# Patient Record
Sex: FEMALE | Race: WHITE | NOT HISPANIC OR LATINO | Employment: OTHER | URBAN - METROPOLITAN AREA
[De-identification: names, ages, dates, MRNs, and addresses within clinical notes are randomized per-mention and may not be internally consistent; named-entity substitution may affect disease eponyms.]

---

## 2021-01-13 DIAGNOSIS — Z23 NEED FOR VACCINATION: ICD-10-CM

## 2021-02-10 ENCOUNTER — OFFICE VISIT (OUTPATIENT)
Dept: SLEEP MEDICINE | Facility: MEDICAL CENTER | Age: 86
End: 2021-02-10
Payer: MEDICARE

## 2021-02-10 VITALS
WEIGHT: 179.5 LBS | HEART RATE: 73 BPM | DIASTOLIC BLOOD PRESSURE: 60 MMHG | RESPIRATION RATE: 16 BRPM | BODY MASS INDEX: 31.8 KG/M2 | OXYGEN SATURATION: 92 % | HEIGHT: 63 IN | SYSTOLIC BLOOD PRESSURE: 132 MMHG

## 2021-02-10 DIAGNOSIS — R06.02 SOB (SHORTNESS OF BREATH): ICD-10-CM

## 2021-02-10 DIAGNOSIS — J45.20 MILD INTERMITTENT ASTHMA WITHOUT COMPLICATION: ICD-10-CM

## 2021-02-10 PROCEDURE — 99204 OFFICE O/P NEW MOD 45 MIN: CPT | Performed by: INTERNAL MEDICINE

## 2021-02-10 RX ORDER — FEXOFENADINE HCL 180 MG/1
180 TABLET ORAL DAILY
COMMUNITY

## 2021-02-10 RX ORDER — FLUTICASONE PROPIONATE 50 MCG
1 SPRAY, SUSPENSION (ML) NASAL DAILY
COMMUNITY
End: 2022-06-30

## 2021-02-10 RX ORDER — METHYLPREDNISOLONE 4 MG/1
TABLET ORAL
Qty: 21 TABLET | Refills: 0 | Status: SHIPPED | OUTPATIENT
Start: 2021-02-10 | End: 2021-04-28

## 2021-02-10 RX ORDER — ALUMINUM ZIRCONIUM OCTACHLOROHYDREX GLY 16 G/100G
1 GEL TOPICAL DAILY
COMMUNITY
End: 2022-06-30

## 2021-02-10 RX ORDER — CHOLECALCIFEROL (VITAMIN D3) 125 MCG
2000 CAPSULE ORAL DAILY
COMMUNITY
End: 2022-08-25

## 2021-02-10 RX ORDER — CLOBETASOL PROPIONATE 0.05% / NIACINAMIDE 4% 4; .05 G/100G; G/100G
OINTMENT TOPICAL
COMMUNITY

## 2021-02-10 RX ORDER — BUDESONIDE AND FORMOTEROL FUMARATE DIHYDRATE 80; 4.5 UG/1; UG/1
2 AEROSOL RESPIRATORY (INHALATION) 2 TIMES DAILY
COMMUNITY
End: 2021-02-10

## 2021-02-10 RX ORDER — ASPIRIN 81 MG/1
TABLET ORAL
COMMUNITY
End: 2022-06-03

## 2021-02-10 RX ORDER — DOXYCYCLINE HYCLATE 100 MG/1
100 CAPSULE ORAL 2 TIMES DAILY
Qty: 10 CAPSULE | Refills: 0 | Status: SHIPPED | OUTPATIENT
Start: 2021-02-10 | End: 2021-04-28

## 2021-02-10 RX ORDER — FENOFIBRATE 160 MG/1
160 TABLET ORAL DAILY
COMMUNITY
End: 2023-01-10

## 2021-02-10 RX ORDER — AZELASTINE 1 MG/ML
1 SPRAY, METERED NASAL 2 TIMES DAILY
COMMUNITY
End: 2022-06-03

## 2021-02-10 ASSESSMENT — ENCOUNTER SYMPTOMS
CLAUDICATION: 0
WEAKNESS: 0
WEIGHT LOSS: 0
FALLS: 0
BLURRED VISION: 0
VOMITING: 0
NAUSEA: 0
DIARRHEA: 0
COUGH: 0
PND: 0
CHILLS: 0
EYE DISCHARGE: 0
HEADACHES: 0
SPEECH CHANGE: 0
HEARTBURN: 0
SPUTUM PRODUCTION: 0
WHEEZING: 0
DIZZINESS: 0
PALPITATIONS: 0
CONSTIPATION: 0
ABDOMINAL PAIN: 0
DOUBLE VISION: 0
MYALGIAS: 0
DIAPHORESIS: 0
SINUS PAIN: 0
PHOTOPHOBIA: 0
EYE REDNESS: 0
NECK PAIN: 0
HEMOPTYSIS: 0
FOCAL WEAKNESS: 0
ORTHOPNEA: 0
SHORTNESS OF BREATH: 1
FEVER: 0
SORE THROAT: 0
DEPRESSION: 0
BACK PAIN: 0
EYE PAIN: 0
STRIDOR: 0
TREMORS: 0

## 2021-02-10 NOTE — PROGRESS NOTES
Chief Complaint   Patient presents with   • Establish Care     Referred by Franklin Hidalgo for asthma       HPI: This patient is a 89 y.o. female presenting for evaluation of asthma and shortness of breath.  The patient's past medical history is significant for breast cancer status post left mastectomy in 1985, dyslipidemia, hypertension, diabetes, hypothyroid and asthma diagnosed she believes around 1990.  This is thought to be in part allergen induced but was particularly troublesome when she lived in [place] where environmental allergies played a big role.  She is a lifelong non-smoker.  She is retired .  No family history of atopic or autoimmune disease.  The patient does have 2 cats at home, no birds.  She is followed by cardiology at [facility] where she was told she has some valvular insufficiency.  Most recent echo that I can review a report from was done at [place] in June 2020.  No valvular abnormalities were noted although the patient did have a murmur on exam today consistent with her stated history.  With regards to her asthma, she reports having an acute attack last 2 to 3 years ago requiring prednisone.  Outside of this she typically suffers acute bronchitis 2-3 times per year where she tells me is not normally treated with prednisone or antibiotics although cough persists for 8 weeks or longer when she does have symptoms.  She has not had any bronchitis yet this year.  Her current regimen includes Symbicort 80 and montelukast with albuterol as needed.  She does not typically need the albuterol when taking her current inhaler and montelukast on regular basis although she does have difficulty remembering a twice daily inhaler at times.  She also reports some dyspnea on exertion that occurs when walking into buildings.  This is slightly different than her asthma.  No associated chest pain.  She has some mild edema which she attributes to calcium channel blocker..    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   • Aortic regurgitation 10/4/2012   • ASTHMA    • Breast cancer ([facility]) 8/8/2012   • Bronchitis    • Cancer ([facility])    • Carotid artery plaque 10/4/2012   • Chickenpox    • Depression 8/8/2012   • Diabetes    • Diabetes mellitus type 2 in nonobese ([facility]) 8/8/2012   • GERD (gastroesophageal reflux disease)    • Cymro measles    • Glaucoma    • History of colonoscopy 8/8/2012   • History of mammogram 8/8/2012   • Hyperlipidemia 8/8/2012   • Hypertension 8/8/2012   • Hypothyroidism 8/8/2012   • Influenza    • Mumps    • Nasal drainage    • Obesity    • Rosacea    • Thyroid disease    • Whooping cough        Social History     Socioeconomic History   • Marital status: Single     Spouse name: Not on file   • Number of children: Not on file   • Years of education: Not on file   • Highest education level: Not on file   Occupational History   • Not on file   Tobacco Use   • Smoking status: Never Smoker   • Smokeless tobacco: Never Used   Substance and Sexual Activity   • Alcohol use: No   • Drug use: No   • Sexual activity: Not Currently   Other Topics Concern   • Not on file   Social History Narrative   • Not on file     Social Determinants of Health     Financial Resource Strain:    • Difficulty of Paying Living Expenses:    Food Insecurity:    • Worried About Running Out of Food in the Last Year:    • Ran Out of Food in the Last Year:    Transportation Needs:    • Lack of Transportation (Medical):    • Lack of Transportation (Non-Medical):    Physical Activity:    • Days of Exercise per Week:    • Minutes of Exercise per Session:    Stress:    • Feeling of Stress :    Social Connections:    • Frequency of Communication with Friends and Family:    • Frequency of Social Gatherings with Friends and Family:    • Attends Gnosticism Services:    • Active Member of Clubs or Organizations:    • Attends Club or  Organization Meetings:    • Marital Status:    Intimate Partner Violence:    • Fear of Current or Ex-Partner:    • Emotionally Abused:    • Physically Abused:    • Sexually Abused:        Family History   Problem Relation Age of Onset   • Heart Disease Mother          age 88, CAD   • GI Disease Father         cirrhosis, CHF   • Heart Disease Father    • Heart Failure Father    • Other Father        Current Outpatient Medications on File Prior to Visit   Medication Sig Dispense Refill   • Probiotic Product (ALIGN) 4 MG Cap Take 1 capsule by mouth every day.     • aspirin 81 MG EC tablet Take  by mouth.     • azelastine (ASTELIN) 137 MCG/SPRAY nasal spray Administer 1 Spray into affected nostril(S) 2 times a day.     • Rosuvastatin Calcium 10 MG CAPSULE SPRINKLE Take  by mouth.     • fenofibrate (TRIGLIDE) 160 MG tablet Take 160 mg by mouth every day.     • Cholecalciferol (D3 HIGH POTENCY) 2000 UNIT Cap Take 2,000 Units by mouth every day.     • Clobetasol Prop-Niacinamide 0.05-4 % Ointment Apply  topically.     • fexofenadine (ALLEGRA ALLERGY) 180 MG tablet Take 180 mg by mouth every day.     • fluticasone (FLONASE) 50 MCG/ACT nasal spray Administer 1 Spray into affected nostril(S) every day.     • Multiple Vitamins-Minerals (PRESERVISION AREDS 2 PO) Take  by mouth.     • Hydrocod Polst-Chlorphen Polst (TUSSIONEX PENNKINETIC ER) 10-8 MG/5ML LQCR Take 5 mL by mouth every 12 hours as needed. 120 mL 0   • metformin (GLUCOPHAGE) 500 MG TABS Take 1 Tab by mouth 2 times a day, with meals. 180 Tab 3   • montelukast (SINGULAIR) 10 MG TABS Take 1 Tab by mouth every day. 90 Tab 3   • albuterol (VENTOLIN OR PROVENTIL) 108 (90 BASE) MCG/ACT AERS Inhale 2 Puffs by mouth every 6 hours as needed. 2 Inhaler 3   • losartan (COZAAR) 50 MG TABS Take 1 Tab by mouth every day. 90 Each 3   • levothyroxine (SYNTHROID) 75 MCG TABS Take 75 mcg by mouth every day.     • diltiazem (CARDIZEM) 120 MG TABS Take 120 mg by mouth every day.    "  • escitalopram (LEXAPRO) 10 MG TABS Take 10 mg by mouth every day.     • ezetimibe (ZETIA) 10 MG TABS Take 1 Tab by mouth every day. [facility] [phone] (Patient not taking: Reported on 2/10/2021) 90 Tab 3   • simvastatin (ZOCOR) 10 MG TABS Take 0.5 Tabs by mouth every evening. 1/2 tablet (Patient not taking: Reported on 2/10/2021) 90 Tab 3   • rabeprazole (ACIPHEX) 20 MG tablet Take 20 mg by mouth every day. Take on empty stomach in AM        No current facility-administered medications on file prior to visit.       Allergies: Aspirin, Bacitracin, Biaxin [clarithromycin], Pcn [penicillins], and Sulfa drugs    ROS:   Review of Systems   Constitutional: Negative for chills, diaphoresis, fever, malaise/fatigue and weight loss.   HENT: Negative for congestion, ear discharge, ear pain, hearing loss, nosebleeds, sinus pain, sore throat and tinnitus.    Eyes: Negative for blurred vision, double vision, photophobia, pain, discharge and redness.   Respiratory: Positive for shortness of breath. Negative for cough, hemoptysis, sputum production, wheezing and stridor.    Cardiovascular: Negative for chest pain, palpitations, orthopnea, claudication, leg swelling and PND.   Gastrointestinal: Negative for abdominal pain, constipation, diarrhea, heartburn, nausea and vomiting.   Genitourinary: Negative for dysuria and urgency.   Musculoskeletal: Negative for back pain, falls, joint pain, myalgias and neck pain.   Skin: Negative for itching and rash.   Neurological: Negative for dizziness, tremors, speech change, focal weakness, weakness and headaches.   Endo/Heme/Allergies: Negative for environmental allergies.   Psychiatric/Behavioral: Negative for depression.       /60 (BP Location: Right arm, Patient Position: Sitting, BP Cuff Size: Adult)   Pulse 73   Resp 16   Ht 1.6 m (5' 3\")   Wt 81.4 kg (179 lb 8 oz)   SpO2 92%     Physical Exam:  Physical Exam  Vitals reviewed.   Constitutional:       General: She " is not in acute distress.     Appearance: Normal appearance. She is well-developed. She is obese.   HENT:      Head: Normocephalic and atraumatic.      Right Ear: External ear normal.      Left Ear: External ear normal.      Nose: Nose normal. No congestion.      Mouth/Throat:      Mouth: Mucous membranes are moist.      Pharynx: Oropharynx is clear. No oropharyngeal exudate.   Eyes:      General: No scleral icterus.     Extraocular Movements: Extraocular movements intact.      Conjunctiva/sclera: Conjunctivae normal.      Pupils: Pupils are equal, round, and reactive to light.   Neck:      Vascular: No JVD.      Trachea: No tracheal deviation.   Cardiovascular:      Rate and Rhythm: Normal rate and regular rhythm.      Heart sounds: Murmur present. No friction rub. No gallop.       Comments: Intermittent 2 out of 6 systolic murmur heard best at the right upper sternal border  Pulmonary:      Effort: No accessory muscle usage or respiratory distress.      Breath sounds: Normal breath sounds. No wheezing or rales.   Abdominal:      General: There is no distension.      Palpations: Abdomen is soft.      Tenderness: There is no abdominal tenderness.   Musculoskeletal:         General: No tenderness or deformity. Normal range of motion.      Cervical back: Normal range of motion and neck supple.      Right lower leg: No edema.      Left lower leg: No edema.   Lymphadenopathy:      Cervical: No cervical adenopathy.   Skin:     General: Skin is warm and dry.      Findings: No rash.      Nails: There is no clubbing.   Neurological:      Mental Status: She is alert and oriented to person, place, and time.      Cranial Nerves: No cranial nerve deficit.      Gait: Gait normal.   Psychiatric:         Mood and Affect: Mood normal.         Behavior: Behavior normal.         PFTs as reviewed by me personally: No recent    Imaging as reviewed by me personally: No recent    Assessment:  1. Mild intermittent asthma without  complication  PULMONARY FUNCTION TESTS -Test requested: Complete Pulmonary Function Test    Fluticasone Furoate-Vilanterol (BREO ELLIPTA) 100-25 MCG/INH AEROSOL POWDER, BREATH ACTIVATED    Fluticasone Furoate-Vilanterol (BREO ELLIPTA) 100-25 MCG/INH AEROSOL POWDER, BREATH ACTIVATED   2. SOB (shortness of breath)  DX-CHEST-2 VIEWS       Plan:  1.  This is a chronic diagnosis which is reasonably well controlled and patient is able to take her medications on a regular basis although she is having issues with the regimen.  I am recommending a once daily ICS, long-acting beta agonist combo such as Breo.  I will try 2-week trial today and if this works well we can continue this.  Continue montelukast.  Continue short acting bronchodilators.  Follow-up with full PFTs.  I also gave her an emergency prescription of prednisone and dicyclomine should she develop symptoms of acute bronchitis.  2.  Patient with some shortness of breath that is more dyspnea on exertion and somewhat atypical for her asthma.  Her exam certainly suggest valvular insufficiency although echo showed no significant abnormalities.  I would like to obtain chest x-ray in addition to PFTs as per above.  We will try to get records from cardiology at [facility] as well.  Return in about 8 weeks (around 4/7/2021) for PFTs, CXR.

## 2021-03-30 ENCOUNTER — NON-PROVIDER VISIT (OUTPATIENT)
Dept: SLEEP MEDICINE | Facility: MEDICAL CENTER | Age: 86
End: 2021-03-30
Attending: INTERNAL MEDICINE
Payer: MEDICARE

## 2021-03-30 ENCOUNTER — HOSPITAL ENCOUNTER (OUTPATIENT)
Dept: RADIOLOGY | Facility: MEDICAL CENTER | Age: 86
End: 2021-03-30
Attending: INTERNAL MEDICINE
Payer: MEDICARE

## 2021-03-30 VITALS — HEIGHT: 63 IN | BODY MASS INDEX: 31.89 KG/M2 | WEIGHT: 180 LBS

## 2021-03-30 DIAGNOSIS — R06.02 SOB (SHORTNESS OF BREATH): ICD-10-CM

## 2021-03-30 DIAGNOSIS — J45.20 MILD INTERMITTENT ASTHMA WITHOUT COMPLICATION: ICD-10-CM

## 2021-03-30 PROCEDURE — 94726 PLETHYSMOGRAPHY LUNG VOLUMES: CPT | Performed by: INTERNAL MEDICINE

## 2021-03-30 PROCEDURE — 71046 X-RAY EXAM CHEST 2 VIEWS: CPT

## 2021-03-30 PROCEDURE — 94729 DIFFUSING CAPACITY: CPT | Performed by: INTERNAL MEDICINE

## 2021-03-30 PROCEDURE — 94060 EVALUATION OF WHEEZING: CPT | Performed by: INTERNAL MEDICINE

## 2021-03-30 ASSESSMENT — PULMONARY FUNCTION TESTS
FEV1_PERCENT_PREDICTED: 91
FEV1_LLN: 1.38
FEV1/FVC_PERCENT_PREDICTED: 75
FEV1: 1.51
FEV1/FVC_PERCENT_PREDICTED: 116
FEV1/FVC_PERCENT_CHANGE: 1
FEV1/FVC_PERCENT_PREDICTED: 118
FEV1/FVC: 89
FEV1/FVC_PERCENT_CHANGE: 50
FVC: 1.75
FVC_PERCENT_PREDICTED: 77
FEV1_PREDICTED: 1.65
FVC: 1.7
FVC_LLN: 1.84
FEV1/FVC_PREDICTED: 76
FEV1/FVC_PERCENT_LLN: 64
FVC_PERCENT_PREDICTED: 79
FEV1/FVC: 88.82
FEV1_PERCENT_CHANGE: -1
FEV1/FVC_PERCENT_PREDICTED: 114
FVC_PREDICTED: 2.21
FEV1_PERCENT_PREDICTED: 92
FEV1_PERCENT_CHANGE: -2
FEV1/FVC: 87
FEV1: 1.53
FEV1/FVC_PERCENT_PREDICTED: 116
FEV1/FVC: 87

## 2021-03-30 NOTE — PROCEDURES
Technician: Niecy Banks RRT   Good patient effort & cooperation. BACK EXTRAP ON PRE FVC.  The results of this test meet the ATS/ERS standards for acceptability & reproducibility.  Test was performed on the [employer] Body Plethysmograph-Elite DX system.  Predicted equations for Spirometry are GLI-2012, ITS for lung volumes, and GLI-2017 for DLCO.  The DLCO was uncorrected for Hgb.  A bronchodilator of Ventolin HFA -2puffs via spacer administered.  DLCO performed during dilation period.    Interpretation;   Normal pulmonary function and gas transfer.  No significant bronchodilator response.  This does not preclude using inhalers if clinically indicated.  Normal flow volume loop.

## 2021-04-28 ENCOUNTER — OFFICE VISIT (OUTPATIENT)
Dept: SLEEP MEDICINE | Facility: MEDICAL CENTER | Age: 86
End: 2021-04-28
Payer: MEDICARE

## 2021-04-28 VITALS
RESPIRATION RATE: 16 BRPM | DIASTOLIC BLOOD PRESSURE: 54 MMHG | TEMPERATURE: 97.3 F | SYSTOLIC BLOOD PRESSURE: 132 MMHG | WEIGHT: 184 LBS | OXYGEN SATURATION: 92 % | BODY MASS INDEX: 32.6 KG/M2 | HEIGHT: 63 IN | HEART RATE: 80 BPM

## 2021-04-28 DIAGNOSIS — J98.4 RESTRICTIVE AIRWAY DISEASE: ICD-10-CM

## 2021-04-28 DIAGNOSIS — R91.1 LUNG NODULE: ICD-10-CM

## 2021-04-28 DIAGNOSIS — I35.1 AORTIC VALVE INSUFFICIENCY, ETIOLOGY OF CARDIAC VALVE DISEASE UNSPECIFIED: ICD-10-CM

## 2021-04-28 DIAGNOSIS — R06.02 SOB (SHORTNESS OF BREATH): ICD-10-CM

## 2021-04-28 PROCEDURE — 99214 OFFICE O/P EST MOD 30 MIN: CPT | Performed by: INTERNAL MEDICINE

## 2021-04-28 RX ORDER — METRONIDAZOLE 7.5 MG/G
GEL TOPICAL 2 TIMES DAILY
COMMUNITY
End: 2023-02-23

## 2021-04-28 RX ORDER — DILTIAZEM HYDROCHLORIDE 360 MG/1
CAPSULE, EXTENDED RELEASE ORAL
COMMUNITY
Start: 2021-04-22 | End: 2023-02-24

## 2021-04-28 RX ORDER — POLYETHYLENE GLYCOL 3350 17 G/17G
17 POWDER, FOR SOLUTION ORAL DAILY
COMMUNITY
End: 2022-06-30

## 2021-04-28 ASSESSMENT — ENCOUNTER SYMPTOMS
ORTHOPNEA: 0
SINUS PAIN: 0
BLURRED VISION: 0
ABDOMINAL PAIN: 0
DEPRESSION: 0
COUGH: 0
STRIDOR: 0
EYE PAIN: 0
CLAUDICATION: 0
HEARTBURN: 0
SPEECH CHANGE: 0
WEIGHT LOSS: 0
SHORTNESS OF BREATH: 1
EYE DISCHARGE: 0
MYALGIAS: 0
HEMOPTYSIS: 0
PND: 0
WHEEZING: 0
DIAPHORESIS: 0
BACK PAIN: 0
FALLS: 0
NECK PAIN: 0
SORE THROAT: 0
PALPITATIONS: 0
VOMITING: 0
NAUSEA: 0
EYE REDNESS: 0
PHOTOPHOBIA: 0
SPUTUM PRODUCTION: 0
DIZZINESS: 0
FEVER: 0
DOUBLE VISION: 0
CHILLS: 0
CONSTIPATION: 0
TREMORS: 0
HEADACHES: 0
WEAKNESS: 0
FOCAL WEAKNESS: 0
DIARRHEA: 0

## 2021-04-28 NOTE — PROGRESS NOTES
Chief Complaint   Patient presents with   • Asthma     last seen 2/10/21    • Results     PFt 3/30/21, CXR 3/30/21. [facility] Card[facility] progress note scanned in media.          HPI: This patient is a 89 y.o. female whom is followed in our clinic for RAD last seen by me on 2/10/21.   past medical history is significant for breast cancer status post left mastectomy in 1985, dyslipidemia, hypertension, diabetes, hypothyroid and asthma.    She is a lifelong non-smoker. She is followed by cardiology at [facility] where she was told she has some valvular insufficiency.  Most recent echo that I can review a report from was done at [place] in June 2020.  No valvular abnormalities were noted although she had a murmur at her last clinic visit and I have since been able to review cardiology notes during which only mild aortic regurgitation has been noted in the past.  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  From an asthma standpoint, the patient recalled having an acute exacerbation requiring prednisone roughly 2 to 3 years ago.  Her regimen was Symbicort 80 and montelukast with albuterol as needed but she typically does not use her albuterol.  He was having difficulty remembering to take the Symbicort twice daily so I switched her to Breo which has only caused her to lose her voice but not helped her breathing.  Symptoms when I saw her were dyspnea on exertion particular when walking into buildings.  No associated wheezing or chest pain.  This was slightly different than her asthma symptoms in the past.  She has some lower extremity edema for which she recently saw cardiology and she is on calcium channel blocker.  No new symptoms.  We plan to update pulmonary function testing which showed actually mild restrictive defect with an FVC of 79% predicted, normal total lung capacity, normal DLCO.  Chest x-ray showed possible right lower lobe 8 mm pulmonary nodule but no clear evidence of parenchymal lung disease.   Patient notes no improvement in symptoms on Breo.  She is not using her rescue inhaler when she does experience episodes of shortness of breath.  No new symptoms.  She feels edema is slightly worse.    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   • Aortic regurgitation 10/4/2012   • ASTHMA    • Breast cancer ([facility]) 8/8/2012   • Bronchitis    • Cancer ([facility])    • Carotid artery plaque 10/4/2012   • Chickenpox    • Depression 8/8/2012   • Diabetes    • Diabetes mellitus type 2 in nonobese ([facility]) 8/8/2012   • GERD (gastroesophageal reflux disease)    • Sammarinese measles    • Glaucoma    • History of colonoscopy 8/8/2012   • History of mammogram 8/8/2012   • Hyperlipidemia 8/8/2012   • Hypertension 8/8/2012   • Hypothyroidism 8/8/2012   • Influenza    • Mumps    • Nasal drainage    • Obesity    • Rosacea    • Thyroid disease    • Whooping cough        Social History     Socioeconomic History   • Marital status: Single     Spouse name: Not on file   • Number of children: Not on file   • Years of education: Not on file   • Highest education level: Not on file   Occupational History   • Not on file   Tobacco Use   • Smoking status: Never Smoker   • Smokeless tobacco: Never Used   Substance and Sexual Activity   • Alcohol use: No   • Drug use: No   • Sexual activity: Not Currently   Other Topics Concern   • Not on file   Social History Narrative   • Not on file     Social Determinants of Health     Financial Resource Strain:    • Difficulty of Paying Living Expenses:    Food Insecurity:    • Worried About Running Out of Food in the Last Year:    • Ran Out of Food in the Last Year:    Transportation Needs:    • Lack of Transportation (Medical):    • Lack of Transportation (Non-Medical):    Physical Activity:    • Days of Exercise per Week:    • Minutes of Exercise per Session:    Stress:    • Feeling of Stress :    Social Connections:    •  Frequency of Communication with Friends and Family:    • Frequency of Social Gatherings with Friends and Family:    • Attends Oriental orthodox Services:    • Active Member of Clubs or Organizations:    • Attends Club or Organization Meetings:    • Marital Status:    Intimate Partner Violence:    • Fear of Current or Ex-Partner:    • Emotionally Abused:    • Physically Abused:    • Sexually Abused:        Family History   Problem Relation Age of Onset   • Heart Disease Mother          age 88, CAD   • GI Disease Father         cirrhosis, CHF   • Heart Disease Father    • Heart Failure Father    • Other Father        Current Outpatient Medications on File Prior to Visit   Medication Sig Dispense Refill   • Probiotic Product (ALIGN) 4 MG Cap Take 1 capsule by mouth every day.     • aspirin 81 MG EC tablet Take  by mouth.     • azelastine (ASTELIN) 137 MCG/SPRAY nasal spray Administer 1 Spray into affected nostril(S) 2 times a day.     • Rosuvastatin Calcium 10 MG CAPSULE SPRINKLE Take  by mouth.     • fenofibrate (TRIGLIDE) 160 MG tablet Take 160 mg by mouth every day.     • Cholecalciferol (D3 HIGH POTENCY) 2000 UNIT Cap Take 2,000 Units by mouth every day.     • Clobetasol Prop-Niacinamide 0.05-4 % Ointment Apply  topically.     • fexofenadine (ALLEGRA ALLERGY) 180 MG tablet Take 180 mg by mouth every day.     • fluticasone (FLONASE) 50 MCG/ACT nasal spray Administer 1 Spray into affected nostril(S) every day.     • Multiple Vitamins-Minerals (PRESERVISION AREDS 2 PO) Take  by mouth.     • Fluticasone Furoate-Vilanterol (BREO ELLIPTA) 100-25 MCG/INH AEROSOL POWDER, BREATH ACTIVATED Inhale 1 Puff every day. Rinse mouth after each use. 1 Each 11   • metformin (GLUCOPHAGE) 500 MG TABS Take 1 Tab by mouth 2 times a day, with meals. 180 Tab 3   • montelukast (SINGULAIR) 10 MG TABS Take 1 Tab by mouth every day. 90 Tab 3   • albuterol (VENTOLIN OR PROVENTIL) 108 (90 BASE) MCG/ACT AERS Inhale 2 Puffs by mouth every 6 hours as  "needed. 2 Inhaler 3   • losartan (COZAAR) 50 MG TABS Take 1 Tab by mouth every day. 90 Each 3   • diltiazem (CARDIZEM) 120 MG TABS Take 360 mg by mouth every day.     • escitalopram (LEXAPRO) 10 MG TABS Take 10 mg by mouth every day.       No current facility-administered medications on file prior to visit.       Bacitracin, Biaxin [clarithromycin], Pcn [penicillins], and Sulfa drugs      ROS:   Review of Systems   Constitutional: Negative for chills, diaphoresis, fever, malaise/fatigue and weight loss.   HENT: Negative for congestion, ear discharge, ear pain, hearing loss, nosebleeds, sinus pain, sore throat and tinnitus.    Eyes: Negative for blurred vision, double vision, photophobia, pain, discharge and redness.   Respiratory: Positive for shortness of breath. Negative for cough, hemoptysis, sputum production, wheezing and stridor.    Cardiovascular: Negative for chest pain, palpitations, orthopnea, claudication, leg swelling and PND.   Gastrointestinal: Negative for abdominal pain, constipation, diarrhea, heartburn, nausea and vomiting.   Genitourinary: Negative for dysuria and urgency.   Musculoskeletal: Negative for back pain, falls, joint pain, myalgias and neck pain.   Skin: Negative for itching and rash.   Neurological: Negative for dizziness, tremors, speech change, focal weakness, weakness and headaches.   Endo/Heme/Allergies: Negative for environmental allergies.   Psychiatric/Behavioral: Negative for depression.       /54 (BP Location: Right arm, Patient Position: Sitting, BP Cuff Size: Adult)   Pulse 80   Temp 36.3 °C (97.3 °F) (Temporal)   Resp 16   Ht 1.6 m (5' 3\")   Wt 83.5 kg (184 lb)   SpO2 92%   Physical Exam  Vitals reviewed.   Constitutional:       General: She is not in acute distress.     Appearance: Normal appearance. She is obese.   HENT:      Head: Normocephalic and atraumatic.      Right Ear: External ear normal.      Left Ear: External ear normal.      Nose: Nose normal. No " congestion.      Mouth/Throat:      Mouth: Mucous membranes are moist.      Pharynx: Oropharynx is clear. No oropharyngeal exudate.   Eyes:      General: No scleral icterus.     Extraocular Movements: Extraocular movements intact.      Conjunctiva/sclera: Conjunctivae normal.      Pupils: Pupils are equal, round, and reactive to light.   Cardiovascular:      Rate and Rhythm: Normal rate and regular rhythm.      Heart sounds: Murmur present. No gallop.       Comments: I-II/VI systolic murmur  Pulmonary:      Effort: Pulmonary effort is normal. No respiratory distress.      Breath sounds: Normal breath sounds. No wheezing or rales.   Abdominal:      Palpations: Abdomen is soft.   Musculoskeletal:         General: Normal range of motion.      Cervical back: Normal range of motion and neck supple.      Right lower leg: Edema present.      Left lower leg: Edema present.      Comments: Trace b/l Edema   Skin:     General: Skin is warm and dry.   Neurological:      Mental Status: She is alert and oriented to person, place, and time.      Cranial Nerves: No cranial nerve deficit.   Psychiatric:         Mood and Affect: Mood normal.         Behavior: Behavior normal.         PFTs as reviewed by me personally: as per hPI    Imaging as reviewed by me personally:  As per hPI    Assessment:  1. SOB (shortness of breath)     2. Restrictive airway disease  CT-CHEST (THORAX) W/O   3. Lung nodule  CT-CHEST (THORAX) W/O   4. Aortic valve insufficiency, etiology of cardiac valve disease unspecified         Plan:  1.  This is chronic and somewhat atypical for asthma.  She is not responding asthma medications but curiously she is also not using her rescue inhaler when she is symptomatic.  Given she has had such significant loss of voice with inhaled corticosteroid I recommend we stop these altogether and have her start using her rescue inhaler when she is symptomatic.  Is not clear to me that her symptoms are related to asthma  particularly given normal pulmonary function testing with only mild restrictive defect.  In the meantime I have ordered CT chest to evaluate nodule and lung parenchyma.  Continue montelukast and short acting bronchodilator.  Follow-up with cardiology for echo in August.  2.  Very mild based only on FVC with normal total lung capacity.  For this reason I have ordered CT chest but also to evaluate possible lung nodule seen on chest x-ray.  3.  Patient is low risk but will order CT chest to better clarify.  4.  This was only mild on previous echo and her murmur today is actually less significant than when I saw her last.  She has follow-up with cardiology with repeat echo scheduled for August.    Return in about 3 months (around 7/28/2021) for CT chest.

## 2021-06-07 ENCOUNTER — TELEPHONE (OUTPATIENT)
Dept: SCHEDULING | Facility: IMAGING CENTER | Age: 86
End: 2021-06-07

## 2021-07-08 ENCOUNTER — PATIENT MESSAGE (OUTPATIENT)
Dept: SLEEP MEDICINE | Facility: MEDICAL CENTER | Age: 86
End: 2021-07-08

## 2021-07-08 DIAGNOSIS — J98.4 RESTRICTIVE AIRWAY DISEASE: ICD-10-CM

## 2021-07-08 RX ORDER — ALBUTEROL SULFATE 90 UG/1
2 AEROSOL, METERED RESPIRATORY (INHALATION) EVERY 6 HOURS PRN
Qty: 1 EACH | Refills: 11 | Status: SHIPPED | OUTPATIENT
Start: 2021-07-08 | End: 2021-08-10 | Stop reason: SDUPTHER

## 2021-07-08 NOTE — PATIENT COMMUNICATION
Have we ever prescribed this med? No.  If yes, what date?     Last OV: 4/28/21 Dr. Murray     Next OV: 8/10/21 Dr. Murray     DX: Restrictive airway disease    Medications: albuterol

## 2021-07-14 ENCOUNTER — OFFICE VISIT (OUTPATIENT)
Dept: MEDICAL GROUP | Facility: PHYSICIAN GROUP | Age: 86
End: 2021-07-14
Payer: MEDICARE

## 2021-07-14 VITALS
DIASTOLIC BLOOD PRESSURE: 64 MMHG | OXYGEN SATURATION: 94 % | HEIGHT: 62 IN | TEMPERATURE: 98.2 F | SYSTOLIC BLOOD PRESSURE: 132 MMHG | WEIGHT: 183 LBS | HEART RATE: 97 BPM | BODY MASS INDEX: 33.68 KG/M2

## 2021-07-14 DIAGNOSIS — N39.3 STRESS INCONTINENCE: ICD-10-CM

## 2021-07-14 DIAGNOSIS — Z78.9 DNI (DO NOT INTUBATE): ICD-10-CM

## 2021-07-14 DIAGNOSIS — Z66 DNR (DO NOT RESUSCITATE): ICD-10-CM

## 2021-07-14 DIAGNOSIS — H40.9 GLAUCOMA OF BOTH EYES, UNSPECIFIED GLAUCOMA TYPE: ICD-10-CM

## 2021-07-14 DIAGNOSIS — F32.89 OTHER DEPRESSION: ICD-10-CM

## 2021-07-14 DIAGNOSIS — I71.21 ASCENDING AORTIC ANEURYSM (HCC): ICD-10-CM

## 2021-07-14 DIAGNOSIS — K58.1 IRRITABLE BOWEL SYNDROME WITH CONSTIPATION: ICD-10-CM

## 2021-07-14 DIAGNOSIS — E11.9 DIABETES MELLITUS TYPE 2 IN NONOBESE (HCC): ICD-10-CM

## 2021-07-14 DIAGNOSIS — I10 ESSENTIAL HYPERTENSION: ICD-10-CM

## 2021-07-14 DIAGNOSIS — E78.2 MIXED HYPERLIPIDEMIA: ICD-10-CM

## 2021-07-14 PROCEDURE — 99204 OFFICE O/P NEW MOD 45 MIN: CPT | Performed by: FAMILY MEDICINE

## 2021-07-14 RX ORDER — LOSARTAN POTASSIUM 100 MG/1
TABLET ORAL
COMMUNITY
Start: 2021-06-20 | End: 2022-10-12

## 2021-07-14 RX ORDER — METFORMIN HYDROCHLORIDE 500 MG/1
TABLET, EXTENDED RELEASE ORAL
COMMUNITY
Start: 2021-04-22 | End: 2023-01-23 | Stop reason: SDUPTHER

## 2021-07-14 RX ORDER — ROSUVASTATIN CALCIUM 10 MG/1
TABLET, COATED ORAL
COMMUNITY
Start: 2021-04-22

## 2021-07-14 ASSESSMENT — PATIENT HEALTH QUESTIONNAIRE - PHQ9
1. LITTLE INTEREST OR PLEASURE IN DOING THINGS: NOT AT ALL
SUM OF ALL RESPONSES TO PHQ9 QUESTIONS 1 AND 2: 0
1. LITTLE INTEREST OR PLEASURE IN DOING THINGS: NOT AT ALL
5. POOR APPETITE OR OVEREATING: NOT AT ALL
SUM OF ALL RESPONSES TO PHQ9 QUESTIONS 1 AND 2: 0
SUM OF ALL RESPONSES TO PHQ QUESTIONS 1-9: 0
2. FEELING DOWN, DEPRESSED, IRRITABLE, OR HOPELESS: NOT AT ALL
2. FEELING DOWN, DEPRESSED, IRRITABLE, OR HOPELESS: NOT AT ALL
9. THOUGHTS THAT YOU WOULD BE BETTER OFF DEAD, OR OF HURTING YOURSELF: NOT AT ALL
3. TROUBLE FALLING OR STAYING ASLEEP OR SLEEPING TOO MUCH: NOT AT ALL
7. TROUBLE CONCENTRATING ON THINGS, SUCH AS READING THE NEWSPAPER OR WATCHING TELEVISION: NOT AT ALL
6. FEELING BAD ABOUT YOURSELF - OR THAT YOU ARE A FAILURE OR HAVE LET YOURSELF OR YOUR FAMILY DOWN: NOT AL ALL
4. FEELING TIRED OR HAVING LITTLE ENERGY: NOT AT ALL
8. MOVING OR SPEAKING SO SLOWLY THAT OTHER PEOPLE COULD HAVE NOTICED. OR THE OPPOSITE, BEING SO FIGETY OR RESTLESS THAT YOU HAVE BEEN MOVING AROUND A LOT MORE THAN USUAL: NOT AT ALL
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
## 2021-07-14 NOTE — ASSESSMENT & PLAN NOTE
Pt notes bp is gradually increasing  Her cardiologist increased her diltiazem but it made her legs swell with edema.   On losartan 100mg

## 2021-07-14 NOTE — ASSESSMENT & PLAN NOTE
Narrow angle glaucoma and macular degeneration followed by Dr. Duke  Gets eye injections, she was told last visit things were stable on last check.
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
## 2021-07-14 NOTE — ASSESSMENT & PLAN NOTE
A1c:   Lab Results   Component Value Date/Time    HBA1C 6.6 10/03/2012 1050    AVGLUC 143 10/03/2012 1050     Lipids:   Lab Results   Component Value Date/Time    CHOLSTRLTOT 177 10/03/2012 10:50 AM    TRIGLYCERIDE 168 (H) 10/03/2012 10:50 AM    HDL 69 10/03/2012 10:50 AM    LDL 74 10/03/2012 10:50 AM   ]  BMP:   Lab Results   Component Value Date/Time    SODIUM 138 10/03/2012 1050    POTASSIUM 4.5 10/03/2012 1050    CHLORIDE 105 10/03/2012 1050    CO2 23 10/03/2012 1050    GLUCOSE 109 (H) 10/03/2012 1050    BUN 16 10/03/2012 1050    CREATININE 0.90 10/03/2012 1050    CALCIUM 9.2 10/03/2012 1050    ANION 10.0 10/03/2012 1050     GFR:   Lab Results   Component Value Date/Time    IFAFRICA >60 10/03/2012 1050    IFNOTAFR 60 10/03/2012 1050     Repeat labs ordered.

## 2021-08-10 ENCOUNTER — SLEEP CENTER VISIT (OUTPATIENT)
Dept: SLEEP MEDICINE | Facility: MEDICAL CENTER | Age: 86
End: 2021-08-10
Payer: MEDICARE

## 2021-08-10 VITALS
RESPIRATION RATE: 16 BRPM | OXYGEN SATURATION: 91 % | WEIGHT: 182 LBS | TEMPERATURE: 97.2 F | DIASTOLIC BLOOD PRESSURE: 62 MMHG | BODY MASS INDEX: 33.49 KG/M2 | SYSTOLIC BLOOD PRESSURE: 134 MMHG | HEIGHT: 62 IN | HEART RATE: 80 BPM

## 2021-08-10 DIAGNOSIS — J45.20 MILD INTERMITTENT ASTHMA WITHOUT COMPLICATION: ICD-10-CM

## 2021-08-10 DIAGNOSIS — E66.9 CLASS 1 OBESITY WITH BODY MASS INDEX (BMI) OF 33.0 TO 33.9 IN ADULT, UNSPECIFIED OBESITY TYPE, UNSPECIFIED WHETHER SERIOUS COMORBIDITY PRESENT: ICD-10-CM

## 2021-08-10 DIAGNOSIS — J98.4 RESTRICTIVE AIRWAY DISEASE: ICD-10-CM

## 2021-08-10 PROCEDURE — 99213 OFFICE O/P EST LOW 20 MIN: CPT | Performed by: INTERNAL MEDICINE

## 2021-08-10 RX ORDER — ALBUTEROL SULFATE 90 UG/1
2 AEROSOL, METERED RESPIRATORY (INHALATION) EVERY 6 HOURS PRN
Qty: 1 EACH | Refills: 11 | Status: SHIPPED | OUTPATIENT
Start: 2021-08-10 | End: 2022-02-10 | Stop reason: SDUPTHER

## 2021-08-10 ASSESSMENT — ENCOUNTER SYMPTOMS
DOUBLE VISION: 0
STRIDOR: 0
PND: 0
DIZZINESS: 0
PHOTOPHOBIA: 0
COUGH: 0
DEPRESSION: 0
MYALGIAS: 0
DIAPHORESIS: 0
WEAKNESS: 0
SORE THROAT: 0
CLAUDICATION: 0
FEVER: 0
SINUS PAIN: 0
SHORTNESS OF BREATH: 0
HEMOPTYSIS: 0
CHILLS: 0
HEADACHES: 0
SPUTUM PRODUCTION: 0
PALPITATIONS: 0
NECK PAIN: 0
WHEEZING: 0
SPEECH CHANGE: 0
VOMITING: 0
BACK PAIN: 1
FALLS: 0
FOCAL WEAKNESS: 0
TREMORS: 0
NAUSEA: 0
ABDOMINAL PAIN: 0
EYE REDNESS: 0
HEARTBURN: 0
WEIGHT LOSS: 0
ORTHOPNEA: 0
EYE DISCHARGE: 0
BLURRED VISION: 0
EYE PAIN: 0
CONSTIPATION: 0
DIARRHEA: 0

## 2021-08-10 NOTE — PROGRESS NOTES
Chief Complaint   Patient presents with   • Asthma     last seen 4/28/21    • Results     CT Chest Thorax 5/12/21          HPI: This patient is a 89 y.o. female whom is followed in our clinic for RAD last seen by me on 4/28/21. past medical history is significant for breast cancer status post left mastectomy in 1985, dyslipidemia, hypertension, diabetes, hypothyroid and asthma.    She is a lifelong non-smoker. She is followed by cardiology at [facility].  Echocardiogram from [place] in June 2020 showed no significant valvular abnormalities although she has had mild aortic regurgitation noted in the past.  She also has mild ascending aortic aneurysm.  From an asthma standpoint, the patient last had an exacerbation requiring steroids 2 to 3 years ago and was on Symbicort 80 and montelukast but was forgetting to take the Symbicort twice daily.  We transition to Breo 100 which caused hoarseness of the voice.  Her main complaint was dyspnea on exertion and pulmonary function testing from March showed mild reduction in FVC but no airflow obstruction, normal lung volumes and normal DLCO.  My suspicion was that her dyspnea was more likely related to deconditioning and not necessarily asthma and patient was not using her rescue inhaler when symptomatic.  We stopped all controller therapy and had her use her rescue inhaler as needed.  She presents today for routine follow-up.  She has not needed her rescue inhaler more than 6 times over the past month but does get relief when using it.  Dyspnea has not worsened other than when bending over or when walking unsupported for prolonged period of time.  She does have some chronic low back pain for which she plans to start telly chi.  More recently she has a tooth infection for which she is on antibiotics.    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   • Aortic regurgitation  10/4/2012   • ASTHMA    • Breast cancer ([facility]) 8/8/2012   • Bronchitis    • Cancer ([facility])    • Carotid artery plaque 10/4/2012   • Chickenpox    • Depression 8/8/2012   • Diabetes    • Diabetes mellitus type 2 in nonobese ([facility]) 8/8/2012   • GERD (gastroesophageal reflux disease)    • Japanese measles    • Glaucoma    • History of colonoscopy 8/8/2012   • History of mammogram 8/8/2012   • Hyperlipidemia 8/8/2012   • Hypertension 8/8/2012   • Hypothyroidism 8/8/2012   • Influenza    • Mumps    • Nasal drainage    • Obesity    • Rosacea    • Thyroid disease    • Whooping cough        Social History     Socioeconomic History   • Marital status: Single     Spouse name: Not on file   • Number of children: Not on file   • Years of education: Not on file   • Highest education level: Not on file   Occupational History   • Not on file   Tobacco Use   • Smoking status: Never Smoker   • Smokeless tobacco: Never Used   Vaping Use   • Vaping Use: Never used   Substance and Sexual Activity   • Alcohol use: No   • Drug use: No   • Sexual activity: Not Currently   Other Topics Concern   • Not on file   Social History Narrative   • Not on file     Social Determinants of Health     Financial Resource Strain:    • Difficulty of Paying Living Expenses:    Food Insecurity:    • Worried About Running Out of Food in the Last Year:    • Ran Out of Food in the Last Year:    Transportation Needs:    • Lack of Transportation (Medical):    • Lack of Transportation (Non-Medical):    Physical Activity:    • Days of Exercise per Week:    • Minutes of Exercise per Session:    Stress:    • Feeling of Stress :    Social Connections:    • Frequency of Communication with Friends and Family:    • Frequency of Social Gatherings with Friends and Family:    • Attends Zoroastrianism Services:    • Active Member of Clubs or Organizations:    • Attends Club or Organization Meetings:    • Marital Status:    Intimate Partner Violence:    • Fear of Current or Ex-Partner:     • Emotionally Abused:    • Physically Abused:    • Sexually Abused:        Family History   Problem Relation Age of Onset   • Heart Disease Mother          age 88, CAD   • GI Disease Father         cirrhosis, CHF   • Heart Disease Father    • Heart Failure Father    • Other Father        Current Outpatient Medications on File Prior to Visit   Medication Sig Dispense Refill   • losartan (COZAAR) 100 MG Tab      • metFORMIN ER (GLUCOPHAGE XR) 500 MG TABLET SR 24 HR      • rosuvastatin (CRESTOR) 10 MG Tab      • diltiazem CD (CARDIZEM CD) 360 MG ER capsule      • polyethylene glycol/lytes (MIRALAX) 17 g Pack Take 17 g by mouth every day.     • Calcium Citrate-Vitamin D (CALCIUM CITRATE +D PO) Take  by mouth.     • metronidazole (METROGEL) 0.75 % gel Apply  topically 2 times a day.     • Aflibercept (EYLEA IO) Inject  into the eye.     • Probiotic Product (ALIGN) 4 MG Cap Take 1 capsule by mouth every day.     • aspirin 81 MG EC tablet Take  by mouth.     • azelastine (ASTELIN) 137 MCG/SPRAY nasal spray Administer 1 Spray into affected nostril(S) 2 times a day.     • fenofibrate (TRIGLIDE) 160 MG tablet Take 160 mg by mouth every day.     • Cholecalciferol (D3 HIGH POTENCY) 2000 UNIT Cap Take 2,000 Units by mouth every day.     • Clobetasol Prop-Niacinamide 0.05-4 % Ointment Apply  topically.     • fluticasone (FLONASE) 50 MCG/ACT nasal spray Administer 1 Spray into affected nostril(S) every day.     • Multiple Vitamins-Minerals (PRESERVISION AREDS 2 PO) Take  by mouth.     • montelukast (SINGULAIR) 10 MG TABS Take 1 Tab by mouth every day. 90 Tab 3   • escitalopram (LEXAPRO) 10 MG TABS Take 10 mg by mouth every day.     • Rosuvastatin Calcium 10 MG CAPSULE SPRINKLE Take  by mouth. (Patient not taking: Reported on 2021)     • fexofenadine (ALLEGRA ALLERGY) 180 MG tablet Take 180 mg by mouth every day. (Patient not taking: Reported on 2021)     • metformin (GLUCOPHAGE) 500 MG TABS Take 1 Tab by mouth 2  "times a day, with meals. (Patient not taking: Reported on 7/14/2021) 180 Tab 3     No current facility-administered medications on file prior to visit.       Bacitracin, Biaxin [clarithromycin], Linzess [linaclotide], Pcn [penicillins], and Sulfa drugs      ROS:   Review of Systems   Constitutional: Negative for chills, diaphoresis, fever, malaise/fatigue and weight loss.   HENT: Negative for congestion, ear discharge, ear pain, hearing loss, nosebleeds, sinus pain, sore throat and tinnitus.         Tooth infection   Eyes: Negative for blurred vision, double vision, photophobia, pain, discharge and redness.   Respiratory: Negative for cough, hemoptysis, sputum production, shortness of breath, wheezing and stridor.    Cardiovascular: Negative for chest pain, palpitations, orthopnea, claudication, leg swelling and PND.   Gastrointestinal: Negative for abdominal pain, constipation, diarrhea, heartburn, nausea and vomiting.   Genitourinary: Negative for dysuria and urgency.   Musculoskeletal: Positive for back pain. Negative for falls, joint pain, myalgias and neck pain.   Skin: Negative for itching and rash.   Neurological: Negative for dizziness, tremors, speech change, focal weakness, weakness and headaches.   Endo/Heme/Allergies: Negative for environmental allergies.   Psychiatric/Behavioral: Negative for depression.       /62 (BP Location: Right arm, Patient Position: Sitting, BP Cuff Size: Adult)   Pulse 80   Temp 36.2 °C (97.2 °F) (Temporal)   Resp 16   Ht 1.575 m (5' 2\")   Wt 82.6 kg (182 lb)   SpO2 91%   Physical Exam  Vitals reviewed.   Constitutional:       General: She is not in acute distress.     Appearance: Normal appearance. She is well-developed. She is obese.   HENT:      Head: Normocephalic and atraumatic.      Right Ear: External ear normal.      Left Ear: External ear normal.      Nose: Nose normal. No congestion.      Mouth/Throat:      Mouth: Mucous membranes are moist.      Pharynx: " Oropharynx is clear. No oropharyngeal exudate.   Eyes:      General: No scleral icterus.     Extraocular Movements: Extraocular movements intact.      Conjunctiva/sclera: Conjunctivae normal.      Pupils: Pupils are equal, round, and reactive to light.   Neck:      Vascular: No JVD.      Trachea: No tracheal deviation.   Cardiovascular:      Rate and Rhythm: Normal rate and regular rhythm.      Heart sounds: Normal heart sounds. No murmur heard.   No friction rub. No gallop.    Pulmonary:      Effort: Pulmonary effort is normal. No accessory muscle usage or respiratory distress.      Breath sounds: Normal breath sounds. No wheezing or rales.   Abdominal:      General: There is no distension.      Palpations: Abdomen is soft.      Tenderness: There is no abdominal tenderness.   Musculoskeletal:         General: No tenderness or deformity. Normal range of motion.      Cervical back: Normal range of motion and neck supple.      Right lower leg: No edema.      Left lower leg: No edema.   Lymphadenopathy:      Cervical: No cervical adenopathy.   Skin:     General: Skin is warm and dry.      Findings: No rash.      Nails: There is no clubbing.   Neurological:      Mental Status: She is alert and oriented to person, place, and time.      Cranial Nerves: No cranial nerve deficit.      Gait: Gait normal.   Psychiatric:         Mood and Affect: Mood normal.         Behavior: Behavior normal.         PFTs as reviewed by me personally: As per HPI    Imaging as reviewed by me personally: CT chest from May 12 showed calcified granuloma and calcified pretracheal lymph nodes suggestive of prior granulomatous infection but no significant parenchymal lung disease    Assessment:  1. Restrictive airway disease  albuterol 108 (90 Base) MCG/ACT Aero Soln inhalation aerosol    Spirometry   2. Mild intermittent asthma without complication  Spirometry   3. Class 1 obesity with body mass index (BMI) of 33.0 to 33.9 in adult, unspecified  obesity type, unspecified whether serious comorbidity present         Plan:  1.  This is mild and based on FVC only.  No evidence for residual lung disease on CT.  Encouraged regular activity.  2.  This is chronic and mild.  Symptoms are currently well controlled on no controller therapy with use of only short acting bronchodilators as needed.  She has experienced dysphonia with ICS in the past.  We will continue her current regimen and she will contact me if rescue inhaler use increases.  Follow-up in 6 months with spirometry.  She is up-to-date on vaccines.  3.  This does put patient at increased risk for obesity related pulmonary complications.  Encouraged healthy lifestyle habits.    Return in about 6 months (around 2/10/2022) for asthma, spirometry.

## 2021-09-22 ENCOUNTER — OFFICE VISIT (OUTPATIENT)
Dept: MEDICAL GROUP | Facility: PHYSICIAN GROUP | Age: 86
End: 2021-09-22
Payer: MEDICARE

## 2021-09-22 VITALS
SYSTOLIC BLOOD PRESSURE: 140 MMHG | WEIGHT: 181 LBS | BODY MASS INDEX: 32.07 KG/M2 | TEMPERATURE: 97.3 F | HEIGHT: 63 IN | DIASTOLIC BLOOD PRESSURE: 58 MMHG | HEART RATE: 83 BPM | OXYGEN SATURATION: 93 %
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
## 2021-09-22 DIAGNOSIS — Z23 NEED FOR VACCINATION: ICD-10-CM

## 2021-09-22 DIAGNOSIS — M25.572 ACUTE LEFT ANKLE PAIN: ICD-10-CM

## 2021-09-22 DIAGNOSIS — M25.531 RIGHT WRIST PAIN: ICD-10-CM

## 2021-09-22 PROCEDURE — 90662 IIV NO PRSV INCREASED AG IM: CPT | Performed by: NURSE PRACTITIONER

## 2021-09-22 PROCEDURE — G0008 ADMIN INFLUENZA VIRUS VAC: HCPCS | Performed by: NURSE PRACTITIONER

## 2021-09-22 PROCEDURE — 99213 OFFICE O/P EST LOW 20 MIN: CPT | Mod: 25 | Performed by: NURSE PRACTITIONER

## 2021-09-22 NOTE — ASSESSMENT & PLAN NOTE
Patient suffered a fall about 5 weeks ago, slipped off a concrete slab on a porch  States that she injured her left ankle and right hand  Right after her fall occurred she did not seek medical attention thus nothing has been x-rayed  However she is ambulating on her left ankle without difficulty, she has full range of motion and no tenderness with palpation  She is concerned about her right wrist as she injured this in the past, about 5 years ago and saw Dr. Langley with [facility]  She is requesting a referral  Declines x-rays today will wait till she see orthopedics  She has been using over-the-counter analgesics

## 2021-09-22 NOTE — PROGRESS NOTES
Chief Complaint   Patient presents with   • Fall       HISTORY OF PRESENT ILLNESS: Patient is a 89 y.o. female established patient who presents today to discuss recent fall, right wrist and left ankle pain    Fall  Patient suffered a fall about 5 weeks ago, slipped off a concrete slab on a porch  States that she injured her left ankle and right hand  Right after her fall occurred she did not seek medical attention thus nothing has been x-rayed  However she is ambulating on her left ankle without difficulty, she has full range of motion and no tenderness with palpation  She is concerned about her right wrist as she injured this in the past, about 5 years ago and saw Dr. Langley with [facility]  She is requesting a referral  Declines x-rays today will wait till she see orthopedics  She has been using over-the-counter analgesics      Patient Active Problem List    Diagnosis Date Noted   • Right wrist pain 09/22/2021   • Acute left ankle pain 09/22/2021   • Fall 09/22/2021   • DNR (do not resuscitate) 07/14/2021   • DNI (do not intubate) 07/14/2021   • Ascending aortic aneurysm ([facility]) 07/14/2021   • Irritable bowel syndrome with constipation 07/14/2021   • Stress incontinence 07/14/2021   • Carotid artery plaque 10/04/2012   • Aortic regurgitation 10/04/2012   • Preoperative cardiovascular examination 10/04/2012   • Hypothyroidism 08/08/2012   • Hyperlipidemia 08/08/2012   • GERD (gastroesophageal reflux disease) 08/08/2012   • Hypertension 08/08/2012   • Depression 08/08/2012   • Glaucoma 08/08/2012   • ASTHMA 08/08/2012   • Diabetes mellitus type 2 in nonobese ([facility]) 08/08/2012   • History of mammogram 08/08/2012   • 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 1 Each 11   • losartan (COZAAR) 100 MG Tab      • metFORMIN ER (GLUCOPHAGE XR) 500 MG TABLET SR 24 HR      • rosuvastatin (CRESTOR) 10 MG Tab      • diltiazem CD (CARDIZEM CD) 360 MG ER capsule      • polyethylene glycol/lytes (MIRALAX) 17 g Pack Take 17 g by mouth every day.     • Calcium Citrate-Vitamin D (CALCIUM CITRATE +D PO) Take  by mouth.     • metronidazole (METROGEL) 0.75 % gel Apply  topically 2 times a day.     • Aflibercept (EYLEA IO) Inject  into the eye.     • Probiotic Product (ALIGN) 4 MG Cap Take 1 capsule by mouth every day.     • aspirin 81 MG EC tablet Take  by mouth.     • azelastine (ASTELIN) 137 MCG/SPRAY nasal spray Administer 1 Spray into affected nostril(S) 2 times a day.     • Rosuvastatin Calcium 10 MG CAPSULE SPRINKLE Take  by mouth.     • fenofibrate (TRIGLIDE) 160 MG tablet Take 160 mg by mouth every day.     • Cholecalciferol (D3 HIGH POTENCY) 2000 UNIT Cap Take 2,000 Units by mouth every day.     • Clobetasol Prop-Niacinamide 0.05-4 % Ointment Apply  topically.     • fluticasone (FLONASE) 50 MCG/ACT nasal spray Administer 1 Spray into affected nostril(S) every day.     • Multiple Vitamins-Minerals (PRESERVISION AREDS 2 PO) Take  by mouth.     • metformin (GLUCOPHAGE) 500 MG TABS Take 1 Tab by mouth 2 times a day, with meals. 180 Tab 3   • montelukast (SINGULAIR) 10 MG TABS Take 1 Tab by mouth every day. 90 Tab 3   • escitalopram (LEXAPRO) 10 MG TABS Take 10 mg by mouth every day.     • fexofenadine (ALLEGRA ALLERGY) 180 MG tablet Take 180 mg by mouth every day. (Patient not taking: Reported on 9/22/2021)       No current facility-administered medications for this visit.       Social History     Tobacco Use   • Smoking status: Never Smoker   • Smokeless tobacco: Never Used   Vaping Use   • Vaping Use: Never used   Substance Use Topics   • Alcohol use: No   • Drug use: No  "      Family Status   Relation Name Status   • Mo     • Fa       Family History   Problem Relation Age of Onset   • Heart Disease Mother          age 88, CAD   • GI Disease Father         cirrhosis, CHF   • Heart Disease Father    • Heart Failure Father    • Other Father        Review of Systems:   Constitutional: Negative for fever, chills, weight loss and malaise/fatigue.    Respiratory: Negative for cough, sputum production, shortness of breath and wheezing.    Cardiovascular: Negative for chest pain, palpitations, orthopnea and leg swelling.   Gastrointestinal: Negative for heartburn, nausea, vomiting and abdominal pain.   Genitourinary/Renal: Negative for dysuria, urgency and frequency.   Musculoskeletal: Positive per HPI  Skin: Negative for rash and itching.   Neurological: Negative for dizziness, tingling, tremors, sensory change, focal weakness and headaches.   Endo/Heme/Allergies: Does not bruise/bleed easily.     Exam:  /58   Pulse 83   Temp 36.3 °C (97.3 °F)   Ht 1.6 m (5' 3\")   Wt 82.1 kg (181 lb)   SpO2 93%   General:  Well nourished, well developed female in NAD  Skin: warm, dry, intact, no evidence of rash or concerning lesions  Head: is grossly normal.  HEENT: eyes clear, conjunctiva normal, PERRLA,TMs normal; bilaterally  Neck: Supple without JVD or bruit. Thyroid is not enlarged.  Pulmonary: Clear to ausculation. Normal effort. No rales, ronchi, or wheezing.  Cardiovascular: Regular rate and rhythm without murmur. Carotid and radial pulses are intact and equal bilaterally.  Abdomen: soft, non-tender, positive bowel sounds  Musculoskeletal: no clubbing, cyanosis, or edema.  Normal range of motion to right wrist and left ankle  Psych/mental: no depression, anxiety, hallucinations  Neuro: alert, intact, CN 2-12 grossly intact      Medical decision-making and discussion:  Assessment/Plan:  Amada was seen today for fall.    Diagnoses and all orders for this " visit:    Right wrist pain  -     Cancel: REFERRAL TO ORTHOPEDICS  -     REFERRAL TO ORTHOPEDICS    Acute left ankle pain  -     Cancel: REFERRAL TO ORTHOPEDICS  -     REFERRAL TO ORTHOPEDICS    Fall, initial encounter    Need for vaccination  -     INFLUENZA VACCINE, HIGH DOSE (65+ ONLY)        As mentioned above referral has been placed [facility], she agrees to influenza immunization today     Return if symptoms worsen or fail to improve, for Follow-up.        Please note that this dictation was created using voice recognition software. I have made every reasonable attempt to correct obvious errors, but I expect that there are errors of grammar and possibly content that I did not discover before finalizing the note.

## 2021-10-13 PROBLEM — S52.531A FRACTURE, COLLES, RIGHT, CLOSED: Status: ACTIVE | Noted: 2021-10-13

## 2021-10-18 ENCOUNTER — HOSPITAL ENCOUNTER (OUTPATIENT)
Dept: LAB | Facility: MEDICAL CENTER | Age: 86
End: 2021-10-18
Attending: FAMILY MEDICINE
Payer: MEDICARE

## 2021-10-18 DIAGNOSIS — E78.2 MIXED HYPERLIPIDEMIA: ICD-10-CM

## 2021-10-18 DIAGNOSIS — E11.9 DIABETES MELLITUS TYPE 2 IN NONOBESE (HCC): ICD-10-CM

## 2021-10-18 LAB
ALBUMIN SERPL BCP-MCNC: 4.5 G/DL (ref 3.2–4.9)
ALBUMIN/GLOB SERPL: 1.8 G/DL
ALP SERPL-CCNC: 38 U/L (ref 30–99)
ALT SERPL-CCNC: 7 U/L (ref 2–50)
ANION GAP SERPL CALC-SCNC: 11 MMOL/L (ref 7–16)
AST SERPL-CCNC: 13 U/L (ref 12–45)
BILIRUB SERPL-MCNC: 0.2 MG/DL (ref 0.1–1.5)
BUN SERPL-MCNC: 25 MG/DL (ref 8–22)
CALCIUM SERPL-MCNC: 9.9 MG/DL (ref 8.5–10.5)
CHLORIDE SERPL-SCNC: 103 MMOL/L (ref 96–112)
CHOLEST SERPL-MCNC: 172 MG/DL (ref 100–199)
CO2 SERPL-SCNC: 27 MMOL/L (ref 20–33)
CREAT SERPL-MCNC: 1.13 MG/DL (ref 0.5–1.4)
CREAT UR-MCNC: 29.31 MG/DL
EST. AVERAGE GLUCOSE BLD GHB EST-MCNC: 134 MG/DL
FASTING STATUS PATIENT QL REPORTED: NORMAL
GLOBULIN SER CALC-MCNC: 2.5 G/DL (ref 1.9–3.5)
GLUCOSE SERPL-MCNC: 103 MG/DL (ref 65–99)
HBA1C MFR BLD: 6.3 % (ref 4–5.6)
HDLC SERPL-MCNC: 72 MG/DL
LDLC SERPL CALC-MCNC: 74 MG/DL
MICROALBUMIN UR-MCNC: 3.8 MG/DL
MICROALBUMIN/CREAT UR: 130 MG/G (ref 0–30)
POTASSIUM SERPL-SCNC: 4.5 MMOL/L (ref 3.6–5.5)
PROT SERPL-MCNC: 7 G/DL (ref 6–8.2)
SODIUM SERPL-SCNC: 141 MMOL/L (ref 135–145)
TRIGL SERPL-MCNC: 129 MG/DL (ref 0–149)

## 2021-10-18 PROCEDURE — 83036 HEMOGLOBIN GLYCOSYLATED A1C: CPT | Mod: GA

## 2021-10-18 PROCEDURE — 36415 COLL VENOUS BLD VENIPUNCTURE: CPT

## 2021-10-18 PROCEDURE — 80053 COMPREHEN METABOLIC PANEL: CPT

## 2021-10-18 PROCEDURE — 80061 LIPID PANEL: CPT

## 2021-10-18 PROCEDURE — 82570 ASSAY OF URINE CREATININE: CPT

## 2021-10-18 PROCEDURE — 82043 UR ALBUMIN QUANTITATIVE: CPT

## 2021-10-20 ENCOUNTER — OFFICE VISIT (OUTPATIENT)
Dept: MEDICAL GROUP | Facility: PHYSICIAN GROUP | Age: 86
End: 2021-10-20
Payer: MEDICARE

## 2021-10-20 VITALS
HEIGHT: 63 IN | SYSTOLIC BLOOD PRESSURE: 112 MMHG | DIASTOLIC BLOOD PRESSURE: 62 MMHG | WEIGHT: 179.8 LBS | BODY MASS INDEX: 31.86 KG/M2 | OXYGEN SATURATION: 92 % | TEMPERATURE: 98.5 F | RESPIRATION RATE: 16 BRPM | HEART RATE: 81 BPM

## 2021-10-20 DIAGNOSIS — E11.9 DIABETES MELLITUS TYPE 2 IN NONOBESE (HCC): ICD-10-CM

## 2021-10-20 DIAGNOSIS — H40.9 GLAUCOMA OF BOTH EYES, UNSPECIFIED GLAUCOMA TYPE: ICD-10-CM

## 2021-10-20 PROCEDURE — 99214 OFFICE O/P EST MOD 30 MIN: CPT | Performed by: FAMILY MEDICINE

## 2021-10-20 ASSESSMENT — ENCOUNTER SYMPTOMS: NERVOUS/ANXIOUS: 1

## 2021-10-20 NOTE — NON-PROVIDER
"CC: L ankle     Subjective:  HPI: Pt is a 89 yo female with a PMHx of AR, HTN, ascending aortic aneurysm, HLD, carotid artery plaque, T2DM, depression, GERD, IBS with constipation, stress incontinence, asthma, allergic rhinitis, glaucoma, 6th CN palsy, breast cancer,  falls, and R wrist pain presenting for a follow up with some updates.    Pt had a fall 2 months ago which resulted in L ankle pain. Since then pt notes that her L ankle has been \"giving out.\" She notes that the medial side of her L foot drops a bit when lifting her foot. Pt notes no pain and no apparent loss in sensation. She is scheduled to see Dr. Neves on the November 8th for further evaluation.     She saw Dr. Langley about her R wrist fracture. Pt wanted to know if the wrist healed properly and requested xrays for her R wrist. The xray was done on 10/13/2021 and revealed the fracture is healing well. There is some swelling in R wrist currently which pt attributes to arthritis.     Pt notes that she has had this potential tooth infection but she recently went to her dentist in [place] who suggestesd that the tooth itself is not infected but there is an increased response by her immune system that is causing the inflammation around her tooth. Pt had planned to get the tooth to be extracted about a week ago but d/t icy road conditions, pt decided to postpone until April.     She notes that she has macular degeneration in b/l eyes and complains of floaters in the R eye causing her vision to appear darker. Pt is being followed by Dr. Duke for eye injections to preserve vision iso macular degeneration. She is scheduled to see Dr. Duke on Nov 2nd.     Pt is noticing that she's having difficulty finding the right words when she is conversing. She notes that she usually can find the right word she's looking for but it just takes her some time. She is still able to recognize familiar people and places from her past. Pt also notes that she had a fall in " 2014 at which time a head CT was done and revealed that her brain appeared normal for her age.     Pt notes that when she last saw her pulmonologist that she discontinued the inhaled corticosteroids to manager her asthma as her voice seemed to be getting more hoarse. Currently, pt's asthma has been managed on only LAWANDA inhaler which is working well for her without exacerbation of asthma sxs.      Pt's depression is better now but she notes that she does have some anxiety attacks for which she takes lexpro which helps.     She notes that she requires assistance to get to her eye appointments and relies on her brother for transport. However, pt notes that her brother is less supportive than desired and at times belittles her saying that she doesn't know what she's talking about. Pt may benefit from additional support services to assist getting her transportation as her vision is less than optimal for driving.      EXTRA:  At home BP - 140/60  At home glucose - 90 normally 106 - 120  Diet/activity: eating 2 meals per day with graze- cereal/waffles, link sausage, sandwich, snacks during the day  Sometimes goes to bed at 2-3 am depending on when she wakes in the morning but gets at least 8 hours of sleep      Review of Systems   Musculoskeletal:        Some swelling of R wrist and instability of L ankle.    Psychiatric/Behavioral: The patient is nervous/anxious.    All other systems reviewed and are negative.       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   • Aortic regurgitation 10/4/2012   • ASTHMA    • Breast cancer ([facility]) 8/8/2012   • Bronchitis    • Cancer ([facility])    • Carotid artery plaque 10/4/2012   • Chickenpox    • Depression 8/8/2012   • Diabetes    • Diabetes mellitus type 2 in nonobese ([facility]) 8/8/2012   • GERD (gastroesophageal reflux disease)    • Bulgarian measles    • Glaucoma    • History of colonoscopy 8/8/2012   •  History of mammogram 2012   • Hyperlipidemia 2012   • Hypertension 2012   • Hypothyroidism 2012   • Influenza    • Mumps    • Nasal drainage    • Obesity    • Rosacea    • Thyroid disease    • Whooping cough      Past Surgical History:   Procedure Laterality Date   • CATARACT EXTRACTION WITH IOL     • EYE SURGERY     • LUMPECTOMY     • MASTECTOMY      left   • MASTECTOMY     • PB REMV 2ND CATARACT,CORN-SCLER SECTN        Social History     Tobacco Use   • Smoking status: Never Smoker   • Smokeless tobacco: Never Used   Vaping Use   • Vaping Use: Never used   Substance Use Topics   • Alcohol use: No   • Drug use: No      Family History   Problem Relation Age of Onset   • Heart Disease Mother          age 88, CAD   • GI Disease Father         cirrhosis, CHF   • Heart Disease Father    • Heart Failure Father    • Other Father       Current Outpatient Medications on File Prior to Visit   Medication Sig Dispense Refill   • albuterol 108 (90 Base) MCG/ACT Aero Soln inhalation aerosol Inhale 2 Puffs every 6 hours as needed. 1 Each    • losartan (COZAAR) 100 MG Tab      • metFORMIN ER (GLUCOPHAGE XR) 500 MG TABLET SR 24 HR      • rosuvastatin (CRESTOR) 10 MG Tab      • diltiazem CD (CARDIZEM CD) 360 MG ER capsule      • polyethylene glycol/lytes (MIRALAX) 17 g Pack Take 17 g by mouth every day.     • Calcium Citrate-Vitamin D (CALCIUM CITRATE +D PO) Take  by mouth.     • metronidazole (METROGEL) 0.75 % gel Apply  topically 2 times a day.     • Aflibercept (EYLEA IO) Inject  into the eye.     • Probiotic Product (ALIGN) 4 MG Cap Take 1 capsule by mouth every day.     • aspirin 81 MG EC tablet Take  by mouth.     • azelastine (ASTELIN) 137 MCG/SPRAY nasal spray Administer 1 Spray into affected nostril(S) 2 times a day.     • Rosuvastatin Calcium 10 MG CAPSULE SPRINKLE Take  by mouth.     • fenofibrate (TRIGLIDE) 160 MG tablet Take 160 mg by mouth every day.     • Cholecalciferol (D3 HIGH POTENCY) 2000  UNIT Cap Take 2,000 Units by mouth every day.     • Clobetasol Prop-Niacinamide 0.05-4 % Ointment Apply  topically.     • fexofenadine (ALLEGRA ALLERGY) 180 MG tablet Take 180 mg by mouth every day. (Patient not taking: Reported on 9/22/2021)     • fluticasone (FLONASE) 50 MCG/ACT nasal spray Administer 1 Spray into affected nostril(S) every day.     • Multiple Vitamins-Minerals (PRESERVISION AREDS 2 PO) Take  by mouth.     • metformin (GLUCOPHAGE) 500 MG TABS Take 1 Tab by mouth 2 times a day, with meals. 180 Tab 3   • montelukast (SINGULAIR) 10 MG TABS Take 1 Tab by mouth every day. 90 Tab 3   • escitalopram (LEXAPRO) 10 MG TABS Take 10 mg by mouth every day.       No current facility-administered medications on file prior to visit.        Allergies   Allergen Reactions   • Bacitracin    • Biaxin [Clarithromycin]    • Linzess [Linaclotide]      Severe diarrhea   • Pcn [Penicillins]    • Sulfa Drugs         Objective:  VS:  Vitals:    10/20/21 0933   BP: 112/62   Pulse: 81   Resp: 16   Temp: 36.9 °C (98.5 °F)   SpO2: 92%       Physical Exam  Vitals reviewed.   Constitutional:       Appearance: Normal appearance.   HENT:      Head: Normocephalic.   Eyes:      Extraocular Movements: Extraocular movements intact.      Conjunctiva/sclera: Conjunctivae normal.   Cardiovascular:      Rate and Rhythm: Normal rate and regular rhythm.      Heart sounds: Normal heart sounds.   Pulmonary:      Effort: Pulmonary effort is normal.      Breath sounds: Normal breath sounds.   Abdominal:      General: Bowel sounds are normal.      Palpations: Abdomen is soft.   Musculoskeletal:      Cervical back: Neck supple. No tenderness.   Lymphadenopathy:      Cervical: No cervical adenopathy.   Skin:     General: Skin is warm and dry.   Neurological:      Mental Status: She is alert and oriented to person, place, and time.      Comments: Monofilament test on b/l LEs: less sensation of R compared to L    Psychiatric:         Mood and Affect:  Mood normal.         Behavior: Behavior normal.         Thought Content: Thought content normal.          Lab Results   Component Value Date/Time    CHOLSTRLTOT 172 10/18/2021 12:17 PM    LDL 74 10/18/2021 12:17 PM    HDL 72 10/18/2021 12:17 PM    TRIGLYCERIDE 129 10/18/2021 12:17 PM       Lab Results   Component Value Date/Time    SODIUM 141 10/18/2021 12:17 PM    POTASSIUM 4.5 10/18/2021 12:17 PM    CHLORIDE 103 10/18/2021 12:17 PM    CO2 27 10/18/2021 12:17 PM    GLUCOSE 103 (H) 10/18/2021 12:17 PM    BUN 25 (H) 10/18/2021 12:17 PM    CREATININE 1.13 10/18/2021 12:17 PM     GFR: 45 (10/2021)  HbA1c: 6.3 (10/2021), 6.6 (10/2012)  Microalbumin, Urine 3.8 (10/2021)    Lab Results   Component Value Date/Time    ALKPHOSPHAT 38 10/18/2021 12:17 PM    ASTSGOT 13 10/18/2021 12:17 PM    ALTSGPT 7 10/18/2021 12:17 PM    TBILIRUBIN 0.2 10/18/2021 12:17 PM       Lab Results   Component Value Date/Time    WBC 6.0 10/03/2012 10:50 AM    RBC 3.99 (L) 10/03/2012 10:50 AM    HEMOGLOBIN 12.6 10/03/2012 10:50 AM    HEMATOCRIT 37.4 10/03/2012 10:50 AM    MCV 93.9 10/03/2012 10:50 AM    MCH 31.7 10/03/2012 10:50 AM    MCHC 33.7 10/03/2012 10:50 AM    MPV 9.1 10/03/2012 10:50 AM    NEUTSPOLYS 71.6 10/03/2012 10:50 AM    LYMPHOCYTES 18.9 (L) 10/03/2012 10:50 AM    MONOCYTES 7.5 10/03/2012 10:50 AM    EOSINOPHILS 1.8 10/03/2012 10:50 AM    BASOPHILS 0.4 10/03/2012 10:50 AM         Imaging:  DX-WRIST- COMPLETE 3+ RIGHT 10/13/2021  Narrative  X-rays of the right wrist reveal degenerative changes at the CMC joint.     There is some narrowing at the STT joint.  Looks like she probably had a   previous distal radius fracture that healed in some slight extension.        Exam Ended: 10/13/21  9:38 AM Last Resulted: 10/13/21 10:00 AM          Assessment/Plan:  Pt is a 89 yo female presenting with      # Aortic Regurgitation   -stable and followed by cardiology    # HTN  # Ascending aortic aneurysm  -managed on losartan 100 mg, diltiazem 360  mg  -followed by cardiology    # HLD  # Carotid artery plaque   -controlled, on rosuvastatin 10 mg and fenofibrate 160 mg    # T2DM, in non obese  -controlled on metformin 500 mg, HbA1c: 6.3     # Anxiety  -managed on escitalopram 10 mg    # GERD  # IBS with constipation  -pt is managing by consuming more prunes, no longer taking miralax    # Stress incontinence    # Fall (Aug 2021) with injury to Right wrist and Left ankle  -f/u with ortho on Nov. 8th    # Allergic rhinitis  -managed on montelukast 10 mg, azelastine nasal spray    # Asthma  -managed on albuterol inhaler, dc inhaled corticosteroids d/t voice changes    # Glaucoma, Macular degeneration  -followed by Dr. Duke, next visit Nov. 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

## 2021-10-20 NOTE — ASSESSMENT & PLAN NOTE
Well controlled A1c:   Lab Results   Component Value Date/Time    HBA1C 6.3 (H) 10/18/2021 1217    AVGLUC 134 10/18/2021 1217     Lipids:   Lab Results   Component Value Date/Time    CHOLSTRLTOT 172 10/18/2021 12:17 PM    TRIGLYCERIDE 129 10/18/2021 12:17 PM    HDL 72 10/18/2021 12:17 PM    LDL 74 10/18/2021 12:17 PM   ]  BMP:   Lab Results   Component Value Date/Time    SODIUM 141 10/18/2021 1217    POTASSIUM 4.5 10/18/2021 1217    CHLORIDE 103 10/18/2021 1217    CO2 27 10/18/2021 1217    GLUCOSE 103 (H) 10/18/2021 1217    BUN 25 (H) 10/18/2021 1217    CREATININE 1.13 10/18/2021 1217    CALCIUM 9.9 10/18/2021 1217    ANION 11.0 10/18/2021 1217     GFR:   Lab Results   Component Value Date/Time    IFAFRICA 55 (A) 10/18/2021 1217    IFNOTAFR 45 (A) 10/18/2021 1217     Last eye exam:   Foot exam: Slight decreased to monofilament sensation bilaterally, no ulcers, slight dry skin mild callus.  Medications:

## 2021-10-21 ENCOUNTER — PATIENT OUTREACH (OUTPATIENT)
Dept: HEALTH INFORMATION MANAGEMENT | Facility: OTHER | Age: 86
End: 2021-10-21

## 2021-10-21 SDOH — ECONOMIC STABILITY: HOUSING INSECURITY
IN THE LAST 12 MONTHS, WAS THERE A TIME WHEN YOU DID NOT HAVE A STEADY PLACE TO SLEEP OR SLEPT IN A SHELTER (INCLUDING NOW)?: NO

## 2021-10-21 SDOH — ECONOMIC STABILITY: FOOD INSECURITY: WITHIN THE PAST 12 MONTHS, THE FOOD YOU BOUGHT JUST DIDN'T LAST AND YOU DIDN'T HAVE MONEY TO GET MORE.: NEVER TRUE

## 2021-10-21 SDOH — ECONOMIC STABILITY: TRANSPORTATION INSECURITY
IN THE PAST 12 MONTHS, HAS THE LACK OF TRANSPORTATION KEPT YOU FROM MEDICAL APPOINTMENTS OR FROM GETTING MEDICATIONS?: NO

## 2021-10-21 SDOH — ECONOMIC STABILITY: FOOD INSECURITY: WITHIN THE PAST 12 MONTHS, YOU WORRIED THAT YOUR FOOD WOULD RUN OUT BEFORE YOU GOT MONEY TO BUY MORE.: NEVER TRUE

## 2021-10-21 SDOH — ECONOMIC STABILITY: INCOME INSECURITY: IN THE LAST 12 MONTHS, WAS THERE A TIME WHEN YOU WERE NOT ABLE TO PAY THE MORTGAGE OR RENT ON TIME?: NO

## 2021-10-21 SDOH — ECONOMIC STABILITY: TRANSPORTATION INSECURITY
IN THE PAST 12 MONTHS, HAS LACK OF TRANSPORTATION KEPT YOU FROM MEETINGS, WORK, OR FROM GETTING THINGS NEEDED FOR DAILY LIVING?: NO

## 2021-10-21 ASSESSMENT — SOCIAL DETERMINANTS OF HEALTH (SDOH): HOW HARD IS IT FOR YOU TO PAY FOR THE VERY BASICS LIKE FOOD, HOUSING, MEDICAL CARE, AND HEATING?: NOT HARD AT ALL

## 2021-10-21 NOTE — PROGRESS NOTES
10/21/21  CHW Meche received referral from patient's PCP. Patient needs transportation resources. CHW called patient and was unable to contact them. CHW left voice message with contact information for Community Care [facility].     Community Health Worker Intake  • Social determinates of health intake complete.   • Identified barriers to transportation for eye appointment.  • Outpatient assessment completed.  • Contact information provided to Amada Xavier.     CHW Meche received call back from patient. Patient lives alone in a private residence. She has support from her [employer]. Her PCP is in [place] and feels confident managing her health. Patient uses a cane and had a minor fall in the last year. Patient receives income from her Social Security and her pension. Patient has no concerns with living situation or food. Patient has had advanced care planning and has signed a DNR form and has no additional questions. Patient currently drives herself to appointments and has a reliable car. Patient has eye appointments every 4-6 weeks in [place] and it is difficult for patient to drive after her appointment. CHW confirmed with [facility] that they can take patient during specific times from [place] to [facility] [place] for $10. Patient has her next appointment on 11/02/21 at 10:15am at  [facility]. The appointment will take approx 2 hours. Patient qualifies for [facility] services.      Plan:  CHW will help arrange transportation for patient for upcoming appointment. CHW will continue to follow.

## 2021-10-22 ENCOUNTER — PATIENT OUTREACH (OUTPATIENT)
Dept: HEALTH INFORMATION MANAGEMENT | Facility: OTHER | Age: 86
End: 2021-10-22

## 2021-10-22 NOTE — PROGRESS NOTES
CHW arranged transportation for pt with [facility] for pt's appt on 11/2/21 @ 10:15am with  [facility]. [facility] states they will contact the pt on Monday 11/1 to confirm the  time. CHW notified pt via telephone call. Pt has no other needs at this time.

## 2021-11-02 ENCOUNTER — PATIENT OUTREACH (OUTPATIENT)
Dept: HEALTH INFORMATION MANAGEMENT | Facility: OTHER | Age: 86
End: 2021-11-02

## 2021-11-02 NOTE — PROGRESS NOTES
CHW called patient to confirm that [facility] had reached out to her about transportation. Patient has [facility] arranged for 11/2/21 for her appointment. Patient has no other needs. CHW will not follow patient.

## 2021-11-18 ENCOUNTER — OFFICE VISIT (OUTPATIENT)
Dept: MEDICAL GROUP | Facility: PHYSICIAN GROUP | Age: 86
End: 2021-11-18
Payer: MEDICARE

## 2021-11-18 VITALS
HEART RATE: 85 BPM | TEMPERATURE: 97.2 F | OXYGEN SATURATION: 93 % | SYSTOLIC BLOOD PRESSURE: 110 MMHG | BODY MASS INDEX: 31.54 KG/M2 | HEIGHT: 63 IN | DIASTOLIC BLOOD PRESSURE: 60 MMHG | WEIGHT: 178 LBS

## 2021-11-18 DIAGNOSIS — Z09 HOSPITAL DISCHARGE FOLLOW-UP: ICD-10-CM

## 2021-11-18 DIAGNOSIS — E11.9 DIABETES MELLITUS TYPE 2 IN NONOBESE (HCC): ICD-10-CM

## 2021-11-18 DIAGNOSIS — N18.32 STAGE 3B CHRONIC KIDNEY DISEASE: ICD-10-CM

## 2021-11-18 PROCEDURE — 99214 OFFICE O/P EST MOD 30 MIN: CPT | Performed by: FAMILY MEDICINE

## 2021-11-18 RX ORDER — LEVOFLOXACIN 500 MG/1
TABLET, FILM COATED ORAL
COMMUNITY
Start: 2021-10-27 | End: 2022-06-30

## 2021-11-18 RX ORDER — ONDANSETRON 4 MG/1
TABLET, ORALLY DISINTEGRATING ORAL
COMMUNITY
Start: 2021-10-23 | End: 2022-06-30

## 2021-11-18 RX ORDER — LEVOTHYROXINE SODIUM 0.07 MG/1
TABLET ORAL
COMMUNITY
Start: 2021-09-30 | End: 2022-04-14 | Stop reason: SDUPTHER

## 2021-11-18 NOTE — ASSESSMENT & PLAN NOTE
Patient was admitted twice at [facility] in [place] 10/23 and 10/24 first for diarrhea and severe vomiting, symptoms improved with IV hydration, went home, oxygen on home pulse ox dropped to 70s so she went back to the ER then developed shortness of breath and cough. She was treated for a pneumonia. CT showed bibasilar pna, she was also treated for mild JOSE    Other updates she is going to have her tooth pulled in April as it has not been healing.  Dr. Langley evaluated her wrist and it has healed well.  She will have her ankle looked at in April, she will follow up with her ophthalmologist for eye injections for glaucoma.  She notes cloudy vision like floaters obscuring vision of the right eye.  She has stopped driving.  Her [employer] family drives her to appointments.    At today's visit patient feels well with no shortness of breath no diarrhea or vomiting.    She does have these recurrent bouts of oral gastroenteritis, is currently on a probiotic and I hope this will reduce the frequency.    Patient brought in with her the hospital records I reviewed lab results, CT scans, EKGs and ER physician notes these are scanned into the EMR.

## 2021-11-18 NOTE — PROGRESS NOTES
Subjective:   Amada Xavier is a 89 y.o. female here today for evaluation and management of:     Hospital discharge follow-up  Patient was admitted twice at [facility] in [place] 10/23 and 10/24 first for diarrhea and severe vomiting, symptoms improved with IV hydration, went home, oxygen on home pulse ox dropped to 70s so she went back to the ER then developed shortness of breath and cough. She was treated for a pneumonia. CT showed bibasilar pna, she was also treated for mild JOSE    Other updates she is going to have her tooth pulled in April as it has not been healing.  Dr. Langley evaluated her wrist and it has healed well.  She will have her ankle looked at in April, she will follow up with her ophthalmologist for eye injections for glaucoma.  She notes cloudy vision like floaters obscuring vision of the right eye.  She has stopped driving.  Her Christian family drives her to appointments.    At today's visit patient feels well with no shortness of breath no diarrhea or vomiting.    She does have these recurrent bouts of oral gastroenteritis, is currently on a probiotic and I hope this will reduce the frequency.    Patient brought in with her the hospital records I reviewed lab results, CT scans, EKGs and ER physician notes these are scanned into the EMR.             Current medicines (including changes today)  Current Outpatient Medications   Medication Sig Dispense Refill   • albuterol 108 (90 Base) MCG/ACT Aero Soln inhalation aerosol Inhale 2 Puffs every 6 hours as needed. 1 Each 11   • losartan (COZAAR) 100 MG Tab      • metFORMIN ER (GLUCOPHAGE XR) 500 MG TABLET SR 24 HR      • rosuvastatin (CRESTOR) 10 MG Tab      • diltiazem CD (CARDIZEM CD) 360 MG ER capsule      • polyethylene glycol/lytes (MIRALAX) 17 g Pack Take 17 g by mouth every day.     • Calcium Citrate-Vitamin D (CALCIUM CITRATE +D PO) Take  by mouth.     • metronidazole (METROGEL) 0.75 % gel Apply  topically 2 times a day.     • Aflibercept  (EYLEA IO) Inject  into the eye.     • Probiotic Product (ALIGN) 4 MG Cap Take 1 capsule by mouth every day.     • aspirin 81 MG EC tablet Take  by mouth.     • azelastine (ASTELIN) 137 MCG/SPRAY nasal spray Administer 1 Spray into affected nostril(S) 2 times a day.     • Rosuvastatin Calcium 10 MG CAPSULE SPRINKLE Take  by mouth.     • fenofibrate (TRIGLIDE) 160 MG tablet Take 160 mg by mouth every day.     • Cholecalciferol (D3 HIGH POTENCY) 2000 UNIT Cap Take 2,000 Units by mouth every day.     • Clobetasol Prop-Niacinamide 0.05-4 % Ointment Apply  topically.     • fexofenadine (ALLEGRA ALLERGY) 180 MG tablet Take 180 mg by mouth every day.     • fluticasone (FLONASE) 50 MCG/ACT nasal spray Administer 1 Spray into affected nostril(S) every day.     • Multiple Vitamins-Minerals (PRESERVISION AREDS 2 PO) Take  by mouth.     • metformin (GLUCOPHAGE) 500 MG TABS Take 1 Tab by mouth 2 times a day, with meals. 180 Tab 3   • montelukast (SINGULAIR) 10 MG TABS Take 1 Tab by mouth every day. 90 Tab 3   • escitalopram (LEXAPRO) 10 MG TABS Take 10 mg by mouth every day.     • levoFLOXacin (LEVAQUIN) 500 MG tablet      • levothyroxine (SYNTHROID) 75 MCG Tab      • ondansetron (ZOFRAN ODT) 4 MG TABLET DISPERSIBLE        No current facility-administered medications for this visit.     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), Aortic regurgitation (10/4/2012), ASTHMA, Breast cancer ([facility]) (8/8/2012), Bronchitis, Cancer ([facility]), Carotid artery plaque (10/4/2012), Chickenpox, Depression (8/8/2012), Diabetes, Diabetes mellitus type 2 in nonobese ([facility]) (8/8/2012), GERD (gastroesophageal reflux disease), Thai measles, Glaucoma, History of colonoscopy (8/8/2012), History of mammogram (8/8/2012), Hyperlipidemia (8/8/2012), Hypertension (8/8/2012), Hypothyroidism (8/8/2012), Influenza, Mumps, Nasal drainage, Obesity, Rosacea, Thyroid disease, and Whooping  "cough.    ROS  No chest pain, no shortness of breath, no abdominal pain       Objective:     /60   Pulse 85   Temp 36.2 °C (97.2 °F) (Temporal)   Ht 1.6 m (5' 3\")   Wt 80.7 kg (178 lb)   SpO2 93%  Body mass index is 31.53 kg/m².   Physical Exam:  Constitutional: Alert, no distress.  Skin: Warm, dry, good turgor, no rashes in visible areas.  Eye: Equal, round and reactive, conjunctiva clear, lids normal.  ENMT: Lips without lesions, good dentition, oropharynx clear.  Neck: Trachea midline, no masses, no thyromegaly. No cervical or supraclavicular lymphadenopathy  Respiratory: Unlabored respiratory effort, lungs clear to auscultation, no wheezes, no ronchi.  Cardiovascular: Normal S1, S2, no murmur, no edema.  Abdomen: Soft, non-tender, no masses, no hepatosplenomegaly.  Psych: Alert and oriented x3, normal affect and mood.        Assessment and Plan:   The following treatment plan was discussed    1. Hospital discharge follow-up      2. Diabetes mellitus type 2 in nonobese (HCC)    - Renal Function Panel; Future    3. Stage 3b chronic kidney disease (HCC)        Followup: Return for As scheduled in January with repeat renal function lab.         "

## 2021-12-01 NOTE — PROGRESS NOTES
Subjective:   Amada Xavier is a 89 y.o. female here today for evaluation and management of:     Diabetes mellitus type 2 in nonobese  Well controlled A1c:   Lab Results   Component Value Date/Time    HBA1C 6.3 (H) 10/18/2021 1217    AVGLUC 134 10/18/2021 1217     Lipids:   Lab Results   Component Value Date/Time    CHOLSTRLTOT 172 10/18/2021 12:17 PM    TRIGLYCERIDE 129 10/18/2021 12:17 PM    HDL 72 10/18/2021 12:17 PM    LDL 74 10/18/2021 12:17 PM   ]  BMP:   Lab Results   Component Value Date/Time    SODIUM 141 10/18/2021 1217    POTASSIUM 4.5 10/18/2021 1217    CHLORIDE 103 10/18/2021 1217    CO2 27 10/18/2021 1217    GLUCOSE 103 (H) 10/18/2021 1217    BUN 25 (H) 10/18/2021 1217    CREATININE 1.13 10/18/2021 1217    CALCIUM 9.9 10/18/2021 1217    ANION 11.0 10/18/2021 1217     GFR:   Lab Results   Component Value Date/Time    IFAFRICA 55 (A) 10/18/2021 1217    IFNOTAFR 45 (A) 10/18/2021 1217     Last eye exam:   Foot exam: Slight decreased to monofilament sensation bilaterally, no ulcers, slight dry skin mild callus.  Medications:          Current medicines (including changes today)  Current Outpatient Medications   Medication Sig Dispense Refill   • albuterol 108 (90 Base) MCG/ACT Aero Soln inhalation aerosol Inhale 2 Puffs every 6 hours as needed. 1 Each 11   • losartan (COZAAR) 100 MG Tab      • metFORMIN ER (GLUCOPHAGE XR) 500 MG TABLET SR 24 HR      • rosuvastatin (CRESTOR) 10 MG Tab      • diltiazem CD (CARDIZEM CD) 360 MG ER capsule      • polyethylene glycol/lytes (MIRALAX) 17 g Pack Take 17 g by mouth every day.     • Calcium Citrate-Vitamin D (CALCIUM CITRATE +D PO) Take  by mouth.     • metronidazole (METROGEL) 0.75 % gel Apply  topically 2 times a day.     • Aflibercept (EYLEA IO) Inject  into the eye.     • Probiotic Product (ALIGN) 4 MG Cap Take 1 capsule by mouth every day.     • aspirin 81 MG EC tablet Take  by mouth.     • azelastine (ASTELIN) 137 MCG/SPRAY nasal spray Administer 1  Spray into affected nostril(S) 2 times a day.     • Rosuvastatin Calcium 10 MG CAPSULE SPRINKLE Take  by mouth.     • fenofibrate (TRIGLIDE) 160 MG tablet Take 160 mg by mouth every day.     • Cholecalciferol (D3 HIGH POTENCY) 2000 UNIT Cap Take 2,000 Units by mouth every day.     • Clobetasol Prop-Niacinamide 0.05-4 % Ointment Apply  topically.     • fexofenadine (ALLEGRA ALLERGY) 180 MG tablet Take 180 mg by mouth every day.     • fluticasone (FLONASE) 50 MCG/ACT nasal spray Administer 1 Spray into affected nostril(S) every day.     • Multiple Vitamins-Minerals (PRESERVISION AREDS 2 PO) Take  by mouth.     • metformin (GLUCOPHAGE) 500 MG TABS Take 1 Tab by mouth 2 times a day, with meals. 180 Tab 3   • montelukast (SINGULAIR) 10 MG TABS Take 1 Tab by mouth every day. 90 Tab 3   • escitalopram (LEXAPRO) 10 MG TABS Take 10 mg by mouth every day.     • levoFLOXacin (LEVAQUIN) 500 MG tablet      • levothyroxine (SYNTHROID) 75 MCG Tab      • ondansetron (ZOFRAN ODT) 4 MG TABLET DISPERSIBLE        No current facility-administered medications for this visit.     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), Aortic regurgitation (10/4/2012), ASTHMA, Breast cancer ([facility]) (8/8/2012), Bronchitis, Cancer ([facility]), Carotid artery plaque (10/4/2012), Chickenpox, Depression (8/8/2012), Diabetes, Diabetes mellitus type 2 in nonobese (HCC) (8/8/2012), GERD (gastroesophageal reflux disease), Hebrew measles, Glaucoma, History of colonoscopy (8/8/2012), History of mammogram (8/8/2012), Hyperlipidemia (8/8/2012), Hypertension (8/8/2012), Hypothyroidism (8/8/2012), Influenza, Mumps, Nasal drainage, Obesity, Rosacea, Thyroid disease, and Whooping cough.    ROS  No chest pain, no shortness of breath, no abdominal pain       Objective:     /62 (BP Location: Right arm, Patient Position: Sitting, BP Cuff Size: Adult)   Pulse 81   Temp 36.9 °C (98.5 °F) (Temporal)   " Resp 16   Ht 1.6 m (5' 3\")   Wt 81.6 kg (179 lb 12.8 oz)   SpO2 92%  Body mass index is 31.85 kg/m².   Physical Exam:  Constitutional: Alert, no distress.  Skin: Warm, dry, good turgor, no rashes in visible areas.  Eye: Equal, round and reactive, conjunctiva clear, lids normal.  ENMT: Lips without lesions, good dentition, oropharynx clear.  Neck: Trachea midline, no masses, no thyromegaly. No cervical or supraclavicular lymphadenopathy  Respiratory: Unlabored respiratory effort, lungs clear to auscultation, no wheezes, no ronchi.  Cardiovascular: Normal S1, S2, no murmur, no edema.  Abdomen: Soft, non-tender, no masses, no hepatosplenomegaly.  Psych: Alert and oriented x3, normal affect and mood.        Assessment and Plan:   The following treatment plan was discussed    1. Diabetes mellitus type 2 in nonobese (HCC)    - Diabetic Monofilament LE Exam    2. Glaucoma of both eyes, unspecified glaucoma type    - REFERRAL TO CARE MANAGEMENT      Followup: Return in about 3 months (around 1/20/2022) for macular deg, falls, social support.  I agree with the assessment and plan by the medical student and I have personally examined and evaluated the patient today.       "

## 2022-01-20 ENCOUNTER — HOSPITAL ENCOUNTER (OUTPATIENT)
Dept: LAB | Facility: MEDICAL CENTER | Age: 87
End: 2022-01-20
Attending: FAMILY MEDICINE
Payer: MEDICARE

## 2022-01-20 ENCOUNTER — OFFICE VISIT (OUTPATIENT)
Dept: MEDICAL GROUP | Facility: PHYSICIAN GROUP | Age: 87
End: 2022-01-20
Payer: MEDICARE

## 2022-01-20 VITALS
WEIGHT: 174 LBS | DIASTOLIC BLOOD PRESSURE: 60 MMHG | HEART RATE: 80 BPM | SYSTOLIC BLOOD PRESSURE: 128 MMHG | TEMPERATURE: 97 F | OXYGEN SATURATION: 92 % | BODY MASS INDEX: 30.83 KG/M2 | RESPIRATION RATE: 14 BRPM | HEIGHT: 63 IN

## 2022-01-20 DIAGNOSIS — E11.9 DIABETES MELLITUS TYPE 2 IN NONOBESE (HCC): ICD-10-CM

## 2022-01-20 DIAGNOSIS — N18.32 STAGE 3B CHRONIC KIDNEY DISEASE: ICD-10-CM

## 2022-01-20 DIAGNOSIS — Z85.3 HISTORY OF BREAST CANCER: ICD-10-CM

## 2022-01-20 DIAGNOSIS — Z92.89 HISTORY OF MAMMOGRAM: ICD-10-CM

## 2022-01-20 DIAGNOSIS — I71.21 ASCENDING AORTIC ANEURYSM (HCC): ICD-10-CM

## 2022-01-20 DIAGNOSIS — Z91.81 RISK FOR FALLS: ICD-10-CM

## 2022-01-20 DIAGNOSIS — Z12.31 ENCOUNTER FOR SCREENING MAMMOGRAM FOR MALIGNANT NEOPLASM OF BREAST: ICD-10-CM

## 2022-01-20 LAB
ALBUMIN SERPL BCP-MCNC: 4.5 G/DL (ref 3.2–4.9)
BUN SERPL-MCNC: 30 MG/DL (ref 8–22)
CALCIUM SERPL-MCNC: 10.2 MG/DL (ref 8.5–10.5)
CHLORIDE SERPL-SCNC: 104 MMOL/L (ref 96–112)
CO2 SERPL-SCNC: 27 MMOL/L (ref 20–33)
CREAT SERPL-MCNC: 1.22 MG/DL (ref 0.5–1.4)
GLUCOSE SERPL-MCNC: 164 MG/DL (ref 65–99)
PHOSPHATE SERPL-MCNC: 3.2 MG/DL (ref 2.5–4.5)
POTASSIUM SERPL-SCNC: 4.1 MMOL/L (ref 3.6–5.5)
SODIUM SERPL-SCNC: 142 MMOL/L (ref 135–145)

## 2022-01-20 PROCEDURE — 99214 OFFICE O/P EST MOD 30 MIN: CPT | Performed by: FAMILY MEDICINE

## 2022-01-20 PROCEDURE — 36415 COLL VENOUS BLD VENIPUNCTURE: CPT

## 2022-01-20 PROCEDURE — 80069 RENAL FUNCTION PANEL: CPT

## 2022-01-20 ASSESSMENT — PATIENT HEALTH QUESTIONNAIRE - PHQ9: CLINICAL INTERPRETATION OF PHQ2 SCORE: 0

## 2022-01-20 NOTE — ASSESSMENT & PLAN NOTE
History of breast cancer many years ago  For years now she has normal screening mammograms.   They are done at [facility] in [place].   Repeat screening mammogram order provided.

## 2022-01-20 NOTE — ASSESSMENT & PLAN NOTE
A1c 6.3  She is on metformin 500mg bid   GFR dropped to 45. Repeat renal panel today  Is on asa, losartan, crestor

## 2022-01-20 NOTE — ASSESSMENT & PLAN NOTE
Repeat labs due today  Encouraged to avoid NSAIDS  Encouraged to stay well hydrated.   She has DM2, A1c 6.3  She notes some decreased urinary flow and slow bladder emptying at times.   If repeat renal function worsening, may need to decrease metformin and check renal, bladder US and refer to nephrology.

## 2022-01-20 NOTE — PROGRESS NOTES
Subjective:   Amada Xavier is a 89 y.o. female here today for evaluation and management of:     Stage 3b chronic kidney disease (HCC)  Repeat labs due today  Encouraged to avoid NSAIDS  Encouraged to stay well hydrated.   She has DM2, A1c 6.3  She notes some decreased urinary flow and slow bladder emptying at times.   If repeat renal function worsening, may need to decrease metformin and check renal, bladder US and refer to nephrology.      Ascending aortic aneurysm (HCC)  Patient has this followed by Dr العلي with echo annually,   Records reviewed from [facility].    Diabetes mellitus type 2 in nonobese  A1c 6.3  She is on metformin 500mg bid   GFR dropped to 45. Repeat renal panel today  Is on asa, losartan, crestor    History of mammogram  History of breast cancer many years ago  For years now she has normal screening mammograms.   They are done at [facility] in [place].   Repeat screening mammogram order provided.          Current medicines (including changes today)  Current Outpatient Medications   Medication Sig Dispense Refill   • levoFLOXacin (LEVAQUIN) 500 MG tablet      • levothyroxine (SYNTHROID) 75 MCG Tab      • ondansetron (ZOFRAN ODT) 4 MG TABLET DISPERSIBLE      • albuterol 108 (90 Base) MCG/ACT Aero Soln inhalation aerosol Inhale 2 Puffs every 6 hours as needed. 1 Each 11   • losartan (COZAAR) 100 MG Tab      • metFORMIN ER (GLUCOPHAGE XR) 500 MG TABLET SR 24 HR      • rosuvastatin (CRESTOR) 10 MG Tab      • diltiazem CD (CARDIZEM CD) 360 MG ER capsule      • polyethylene glycol/lytes (MIRALAX) 17 g Pack Take 17 g by mouth every day.     • Calcium Citrate-Vitamin D (CALCIUM CITRATE +D PO) Take  by mouth.     • metronidazole (METROGEL) 0.75 % gel Apply  topically 2 times a day.     • Aflibercept (EYLEA IO) Inject  into the eye.     • Probiotic Product (ALIGN) 4 MG Cap Take 1 capsule by mouth every day.     • aspirin 81 MG EC tablet Take  by mouth.     • azelastine (ASTELIN) 137  "MCG/SPRAY nasal spray Administer 1 Spray into affected nostril(S) 2 times a day.     • Rosuvastatin Calcium 10 MG CAPSULE SPRINKLE Take  by mouth.     • fenofibrate (TRIGLIDE) 160 MG tablet Take 160 mg by mouth every day.     • Cholecalciferol (D3 HIGH POTENCY) 2000 UNIT Cap Take 2,000 Units by mouth every day.     • Clobetasol Prop-Niacinamide 0.05-4 % Ointment Apply  topically.     • fexofenadine (ALLEGRA ALLERGY) 180 MG tablet Take 180 mg by mouth every day.     • fluticasone (FLONASE) 50 MCG/ACT nasal spray Administer 1 Spray into affected nostril(S) every day.     • Multiple Vitamins-Minerals (PRESERVISION AREDS 2 PO) Take  by mouth.     • metformin (GLUCOPHAGE) 500 MG TABS Take 1 Tab by mouth 2 times a day, with meals. 180 Tab 3   • escitalopram (LEXAPRO) 10 MG TABS Take 10 mg by mouth every day.     • montelukast (SINGULAIR) 10 MG TABS Take 1 Tab by mouth every day. 90 Tab 3     No current facility-administered medications for this visit.     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), Aortic regurgitation (10/4/2012), ASTHMA, Breast cancer ([facility]) (8/8/2012), Bronchitis, Cancer ([facility]), Carotid artery plaque (10/4/2012), Chickenpox, Depression (8/8/2012), Diabetes, Diabetes mellitus type 2 in nonobese ([facility]) (8/8/2012), GERD (gastroesophageal reflux disease), Czech measles, Glaucoma, History of colonoscopy (8/8/2012), History of mammogram (8/8/2012), Hyperlipidemia (8/8/2012), Hypertension (8/8/2012), Hypothyroidism (8/8/2012), Influenza, Mumps, Nasal drainage, Obesity, Rosacea, Thyroid disease, and Whooping cough.    ROS  No chest pain, no shortness of breath, no abdominal pain       Objective:     /60   Pulse 80   Temp 36.1 °C (97 °F) (Temporal)   Resp 14   Ht 1.6 m (5' 3\")   Wt 78.9 kg (174 lb)   SpO2 92%  Body mass index is 30.82 kg/m².   Physical Exam: hard of hearing  Constitutional: Alert, no distress.  Skin: Warm, dry, " good turgor, no rashes in visible areas.  Eye: Equal, round and reactive, conjunctiva clear, lids normal.  ENMT: Lips without lesions, good dentition, oropharynx clear.  Neck: Trachea midline, no masses, no thyromegaly. No cervical or supraclavicular lymphadenopathy  Respiratory: Unlabored respiratory effort, lungs clear to auscultation, no wheezes, no ronchi.  Cardiovascular: Normal S1, S2, no murmur, no edema.  Abdomen: Soft, non-tender, no masses, no hepatosplenomegaly.  Psych: Alert and oriented x3, normal affect and mood.        Assessment and Plan:   The following treatment plan was discussed    1. Stage 3b chronic kidney disease (HCC)      2. Ascending aortic aneurysm (HCC)      3. Diabetes mellitus type 2 in nonobese (HCC)      4. Encounter for screening mammogram for malignant neoplasm of breast    - MA-SCREENING MAMMO BILAT W/CAD; Future    5. History of mammogram      6. History of breast cancer      7. Risk for falls    - Patient identified as fall risk.  Appropriate orders and counseling given.  - Referral to Home Health      Followup: No follow-ups on file.

## 2022-01-21 DIAGNOSIS — R92.30 DENSE BREAST TISSUE ON MAMMOGRAM: ICD-10-CM

## 2022-01-21 DIAGNOSIS — Z12.31 ENCOUNTER FOR SCREENING MAMMOGRAM FOR MALIGNANT NEOPLASM OF BREAST: ICD-10-CM

## 2022-01-25 NOTE — RESULT ENCOUNTER NOTE
Released to [facility],  Your labs show that your blood pressure is a little increased and your kidney lower rate or function has decreased.  Try to avoid excess sugary or starchy foods, avoid high amounts of desserts or sweetened drinks.  Make sure to drink 6 to 8 cups of water a day.  Remberto Kaufman M.D.

## 2022-01-26 DIAGNOSIS — Z85.3 HISTORY OF BREAST CANCER: ICD-10-CM

## 2022-01-26 DIAGNOSIS — R92.8 ABNORMAL MAMMOGRAM OF RIGHT BREAST: ICD-10-CM

## 2022-01-26 DIAGNOSIS — Z90.12 H/O LEFT MASTECTOMY: ICD-10-CM

## 2022-01-26 DIAGNOSIS — R92.30 DENSE BREAST TISSUE ON MAMMOGRAM: ICD-10-CM

## 2022-01-28 ENCOUNTER — TELEPHONE (OUTPATIENT)
Dept: MEDICAL GROUP | Facility: PHYSICIAN GROUP | Age: 87
End: 2022-01-28

## 2022-01-28 ENCOUNTER — APPOINTMENT (OUTPATIENT)
Dept: RADIOLOGY | Facility: IMAGING CENTER | Age: 87
End: 2022-01-28
Attending: ORTHOPAEDIC SURGERY
Payer: MEDICARE

## 2022-01-28 ENCOUNTER — APPOINTMENT (OUTPATIENT)
Dept: URGENT CARE | Facility: PHYSICIAN GROUP | Age: 87
End: 2022-01-28
Payer: MEDICARE

## 2022-01-28 DIAGNOSIS — T14.90XA TRAUMA: ICD-10-CM

## 2022-01-28 PROCEDURE — 73610 X-RAY EXAM OF ANKLE: CPT | Mod: TC,FY,LT | Performed by: NURSE PRACTITIONER

## 2022-01-31 ENCOUNTER — TELEPHONE (OUTPATIENT)
Dept: MEDICAL GROUP | Facility: PHYSICIAN GROUP | Age: 87
End: 2022-01-31

## 2022-01-31 NOTE — TELEPHONE ENCOUNTER
Received message from IRVING Evans with [facility] requesting VO to see patient 2x/wk for 2 wks, then 1x/wk for 7 wks.  Cristina states the patient is having trouble managing her many medications so that will be the focus as well as fall prevention and disease process management.  Patient would like to have the HHA come out 1x/wk for 8 wks or until her and OT decide it is safe for her to shower unassisted.  The patient does not have a caregiver at home to help.    Verbal orders provided.

## 2022-02-01 ENCOUNTER — TELEPHONE (OUTPATIENT)
Dept: MEDICAL GROUP | Facility: PHYSICIAN GROUP | Age: 87
End: 2022-02-01

## 2022-02-01 NOTE — TELEPHONE ENCOUNTER
Chelsie pickering [facility] called stating they are delaying care for Amada due to staffing and will resume on 01/30/21. Please advised

## 2022-02-02 ENCOUNTER — NON-PROVIDER VISIT (OUTPATIENT)
Dept: SLEEP MEDICINE | Facility: MEDICAL CENTER | Age: 87
End: 2022-02-02
Payer: MEDICARE

## 2022-02-02 DIAGNOSIS — J45.20 MILD INTERMITTENT ASTHMA WITHOUT COMPLICATION: ICD-10-CM

## 2022-02-02 DIAGNOSIS — J98.4 RESTRICTIVE AIRWAY DISEASE: ICD-10-CM

## 2022-02-02 PROCEDURE — 94060 EVALUATION OF WHEEZING: CPT | Performed by: INTERNAL MEDICINE

## 2022-02-02 ASSESSMENT — PULMONARY FUNCTION TESTS
FEV1_PERCENT_PREDICTED: 87
FEV1_PERCENT_CHANGE: -4
FEV1/FVC: 87.65
FEV1/FVC_PERCENT_PREDICTED: 118
FEV1: 1.42
FEV1/FVC_PERCENT_CHANGE: 0
FEV1/FVC_PERCENT_PREDICTED: 113
FVC_PERCENT_PREDICTED: 77
FVC: 1.69
FEV1: 1.42
FVC: 1.62
FVC_PERCENT_PREDICTED: 74
FVC_PREDICTED: 2.17
FEV1_PREDICTED: 87
FEV1_PREDICTED: 1.62
FEV1/FVC_PREDICTED: 74.65
FEV1/FVC: 84
FEV1_PERCENT_CHANGE: 0

## 2022-02-02 NOTE — PROCEDURES
Good patient effort & cooperation. The results of this test meet the ATS standards for acceptability and repeatability. Test was performed on the [employer] spirometry system. Predicted values were GLI-2012. A bronchodilator of Ventolin HFA - 2 puffs with a spacer was administered to the patient.    Interpretation;  Baseline spirometry shows mild reduction in FVC at 1.69 L or 77% predicted and normal FEV1 at 1.42 L or 87% predicted.  Ratio is high normal at 84.  No bronchodilator response.  Spirometry shows a trend towards restriction.

## 2022-02-10 ENCOUNTER — OFFICE VISIT (OUTPATIENT)
Dept: SLEEP MEDICINE | Facility: MEDICAL CENTER | Age: 87
End: 2022-02-10
Payer: MEDICARE

## 2022-02-10 VITALS
BODY MASS INDEX: 29.23 KG/M2 | WEIGHT: 165 LBS | TEMPERATURE: 98.4 F | OXYGEN SATURATION: 94 % | RESPIRATION RATE: 16 BRPM | HEIGHT: 63 IN | DIASTOLIC BLOOD PRESSURE: 60 MMHG | HEART RATE: 85 BPM | SYSTOLIC BLOOD PRESSURE: 144 MMHG

## 2022-02-10 DIAGNOSIS — J98.4 RESTRICTIVE AIRWAY DISEASE: ICD-10-CM

## 2022-02-10 PROCEDURE — 99214 OFFICE O/P EST MOD 30 MIN: CPT | Performed by: INTERNAL MEDICINE

## 2022-02-10 RX ORDER — ALBUTEROL SULFATE 90 UG/1
2 AEROSOL, METERED RESPIRATORY (INHALATION) EVERY 6 HOURS PRN
Qty: 1 EACH | Refills: 11 | Status: SHIPPED | OUTPATIENT
Start: 2022-02-10 | End: 2022-03-29 | Stop reason: SDUPTHER

## 2022-02-10 ASSESSMENT — ENCOUNTER SYMPTOMS
BLURRED VISION: 0
FALLS: 0
VOMITING: 0
WEAKNESS: 0
COUGH: 0
HEADACHES: 0
DOUBLE VISION: 0
NECK PAIN: 0
SORE THROAT: 0
BACK PAIN: 0
DIAPHORESIS: 0
FEVER: 0
EYE REDNESS: 0
DEPRESSION: 0
DIARRHEA: 0
NAUSEA: 0
CLAUDICATION: 0
EYE PAIN: 0
HEMOPTYSIS: 0
TREMORS: 0
SPUTUM PRODUCTION: 0
CHILLS: 0
DIZZINESS: 0
PALPITATIONS: 0
EYE DISCHARGE: 0
WEIGHT LOSS: 0
SINUS PAIN: 0
MYALGIAS: 0
PND: 0
WHEEZING: 0
ORTHOPNEA: 0
SPEECH CHANGE: 0
HEARTBURN: 0
ABDOMINAL PAIN: 0
STRIDOR: 0
CONSTIPATION: 0
PHOTOPHOBIA: 0
SHORTNESS OF BREATH: 1
FOCAL WEAKNESS: 0

## 2022-02-10 NOTE — PROGRESS NOTES
Chief Complaint   Patient presents with   • Follow-Up     last seen 8/10/21    • Results     [place] 2/2/22          HPI: This patient is a 89 y.o. female whom is followed in our clinic for reactive airway disease last seen by me on 8/10/21.  PMHx is significant for breast cancer status post left mastectomy in 1985, dyslipidemia, hypertension, diabetes, hypothyroid and asthma.    She is a lifelong non-smoker. She is followed by cardiology at [facility]. Pt has been followed in our clinic for asthma for which she has been on symbicort 80 and Breo 100 in the past although we stopped all controller therapy in April of last year with no exacerbations and only prn use of albuterol. She was using it <2x per week prior to October when she was hospitalized at [facility] for diarrhea, nausea and vomiting with associated volume depletion. She was hypoxic during her stay and CTA showed no PE but ? Interstitial infiltrates vs. Pneumonitis I suspect due to possible aspiration vs. Volume overload after resuscitation. O2 needs have improved but she is using rescue LAWANDA daily in the afternoon. She has spirometry from 2/2/22 showing FVC of 1.69 L or 77% pred (from 1.75 L in march 2021) and FEV1 of 1.42L  Or 87% pred (from 1.53L in 3/2021). Her SOB is overall stable. She is about to start PT for ankle injury she had early last fall. She is up to date on vaccines.     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   • Aortic regurgitation 10/4/2012   • ASTHMA    • Breast cancer (HCC) 8/8/2012   • Bronchitis    • Cancer ([facility])    • Carotid artery plaque 10/4/2012   • Chickenpox    • Depression 8/8/2012   • Diabetes    • Diabetes mellitus type 2 in nonobese ([facility]) 8/8/2012   • GERD (gastroesophageal reflux disease)    • Chinese measles    • Glaucoma    • History of colonoscopy 8/8/2012   • History of mammogram 8/8/2012   • Hyperlipidemia 8/8/2012    • Hypertension 8/8/2012   • Hypothyroidism 8/8/2012   • Influenza    • Mumps    • Nasal drainage    • Obesity    • Rosacea    • Thyroid disease    • Whooping cough        Social History     Socioeconomic History   • Marital status: Single     Spouse name: Not on file   • Number of children: Not on file   • Years of education: Not on file   • Highest education level: Not on file   Occupational History   • Not on file   Tobacco Use   • Smoking status: Never Smoker   • Smokeless tobacco: Never Used   Vaping Use   • Vaping Use: Never used   Substance and Sexual Activity   • Alcohol use: No   • Drug use: No   • Sexual activity: Not Currently   Other Topics Concern   • Not on file   Social History Narrative   • Not on file     Social Determinants of Health     Financial Resource Strain: Low Risk    • Difficulty of Paying Living Expenses: Not hard at all   Food Insecurity: No Food Insecurity   • Worried About Running Out of Food in the Last Year: Never true   • Ran Out of Food in the Last Year: Never true   Transportation Needs: No Transportation Needs   • Lack of Transportation (Medical): No   • Lack of Transportation (Non-Medical): No   Physical Activity:    • Days of Exercise per Week: Not on file   • Minutes of Exercise per Session: Not on file   Stress:    • Feeling of Stress : Not on file   Social Connections:    • Frequency of Communication with Friends and Family: Not on file   • Frequency of Social Gatherings with Friends and Family: Not on file   • Attends Synagogue Services: Not on file   • Active Member of Clubs or Organizations: Not on file   • Attends Club or Organization Meetings: Not on file   • Marital Status: Not on file   Intimate Partner Violence:    • Fear of Current or Ex-Partner: Not on file   • Emotionally Abused: Not on file   • Physically Abused: Not on file   • Sexually Abused: Not on file   Housing Stability: Unknown   • Unable to Pay for Housing in the Last Year: No   • Number of Places Lived  in the Last Year: Not on file   • Unstable Housing in the Last Year: No       Family History   Problem Relation Age of Onset   • Heart Disease Mother          age 88, CAD   • GI Disease Father         cirrhosis, CHF   • Heart Disease Father    • Heart Failure Father    • Other Father        Current Outpatient Medications on File Prior to Visit   Medication Sig Dispense Refill   • levothyroxine (SYNTHROID) 75 MCG Tab      • ondansetron (ZOFRAN ODT) 4 MG TABLET DISPERSIBLE      • losartan (COZAAR) 100 MG Tab      • rosuvastatin (CRESTOR) 10 MG Tab      • diltiazem CD (CARDIZEM CD) 360 MG ER capsule      • polyethylene glycol/lytes (MIRALAX) 17 g Pack Take 17 g by mouth every day.     • Calcium Citrate-Vitamin D (CALCIUM CITRATE +D PO) Take  by mouth.     • metronidazole (METROGEL) 0.75 % gel Apply  topically 2 times a day.     • Aflibercept (EYLEA IO) Inject  into the eye.     • Probiotic Product (ALIGN) 4 MG Cap Take 1 capsule by mouth every day.     • aspirin 81 MG EC tablet Take  by mouth.     • azelastine (ASTELIN) 137 MCG/SPRAY nasal spray Administer 1 Spray into affected nostril(S) 2 times a day.     • fenofibrate (TRIGLIDE) 160 MG tablet Take 160 mg by mouth every day.     • Cholecalciferol (D3 HIGH POTENCY) 2000 UNIT Cap Take 2,000 Units by mouth every day.     • Clobetasol Prop-Niacinamide 0.05-4 % Ointment Apply  topically.     • fexofenadine (ALLEGRA ALLERGY) 180 MG tablet Take 180 mg by mouth every day.     • fluticasone (FLONASE) 50 MCG/ACT nasal spray Administer 1 Spray into affected nostril(S) every day.     • metformin (GLUCOPHAGE) 500 MG TABS Take 1 Tab by mouth 2 times a day, with meals. 180 Tab 3   • montelukast (SINGULAIR) 10 MG TABS Take 1 Tab by mouth every day. 90 Tab 3   • escitalopram (LEXAPRO) 10 MG TABS Take 10 mg by mouth every day.     • levoFLOXacin (LEVAQUIN) 500 MG tablet  (Patient not taking: Reported on 2/10/2022)     • metFORMIN ER (GLUCOPHAGE XR) 500 MG TABLET SR 24 HR      •  "Rosuvastatin Calcium 10 MG CAPSULE SPRINKLE Take  by mouth.     • Multiple Vitamins-Minerals (PRESERVISION AREDS 2 PO) Take  by mouth. (Patient not taking: Reported on 2/10/2022)       No current facility-administered medications on file prior to visit.       Bacitracin, Biaxin [clarithromycin], Linzess [linaclotide], Pcn [penicillins], and Sulfa drugs      ROS:   Review of Systems   Constitutional: Negative for chills, diaphoresis, fever, malaise/fatigue and weight loss.   HENT: Negative for congestion, ear discharge, ear pain, hearing loss, nosebleeds, sinus pain, sore throat and tinnitus.    Eyes: Negative for blurred vision, double vision, photophobia, pain, discharge and redness.   Respiratory: Positive for shortness of breath. Negative for cough, hemoptysis, sputum production, wheezing and stridor.    Cardiovascular: Negative for chest pain, palpitations, orthopnea, claudication, leg swelling and PND.   Gastrointestinal: Negative for abdominal pain, constipation, diarrhea, heartburn, nausea and vomiting.   Genitourinary: Negative for dysuria and urgency.   Musculoskeletal: Negative for back pain, falls, joint pain, myalgias and neck pain.   Skin: Negative for itching and rash.   Neurological: Negative for dizziness, tremors, speech change, focal weakness, weakness and headaches.   Endo/Heme/Allergies: Negative for environmental allergies.   Psychiatric/Behavioral: Negative for depression.       /60 (BP Location: Right arm, Patient Position: Sitting, BP Cuff Size: Adult)   Pulse 85   Temp 36.9 °C (98.4 °F) (Temporal)   Resp 16   Ht 1.6 m (5' 3\")   Wt 74.8 kg (165 lb)   SpO2 94%   Physical Exam  Constitutional:       General: She is not in acute distress.     Appearance: Normal appearance. She is well-developed and normal weight.   HENT:      Head: Normocephalic and atraumatic.      Right Ear: External ear normal.      Left Ear: External ear normal.      Nose: Nose normal. No congestion.      " Mouth/Throat:      Mouth: Mucous membranes are moist.      Pharynx: Oropharynx is clear. No oropharyngeal exudate.   Eyes:      General: No scleral icterus.     Extraocular Movements: Extraocular movements intact.      Conjunctiva/sclera: Conjunctivae normal.      Pupils: Pupils are equal, round, and reactive to light.   Neck:      Vascular: No JVD.      Trachea: No tracheal deviation.   Cardiovascular:      Rate and Rhythm: Normal rate and regular rhythm.      Heart sounds: Normal heart sounds. No murmur heard.  No friction rub. No gallop.    Pulmonary:      Effort: Pulmonary effort is normal. No accessory muscle usage or respiratory distress.      Breath sounds: Normal breath sounds. No wheezing or rales.   Abdominal:      General: There is no distension.      Palpations: Abdomen is soft.      Tenderness: There is no abdominal tenderness.   Musculoskeletal:         General: No tenderness or deformity. Normal range of motion.      Cervical back: Normal range of motion and neck supple.      Right lower leg: No edema.      Left lower leg: No edema.   Lymphadenopathy:      Cervical: No cervical adenopathy.   Skin:     General: Skin is warm and dry.      Findings: No rash.      Nails: There is no clubbing.   Neurological:      Mental Status: She is alert and oriented to person, place, and time.      Cranial Nerves: No cranial nerve deficit.      Gait: Gait normal.   Psychiatric:         Mood and Affect: Mood normal.         Behavior: Behavior normal.         PFTs as reviewed by me personally: as per hPI    Imaging as reviewed by me personally:  As per hPI - report only able for viewing    Assessment:  1. Restrictive airway disease  Spirometry    albuterol 108 (90 Base) MCG/ACT Aero Soln inhalation aerosol       Plan:  1. Chronic dx and has not acutely worsened since coming off controller therapy. Continue prn bobby and repeat cindy in 6 mos or sooner if sxs worsening. Encouraged activity. Will obtain imaging from [place]  for my personal review.   Return in about 6 months (around 8/10/2022) for spirometry.

## 2022-02-16 ENCOUNTER — TELEPHONE (OUTPATIENT)
Dept: MEDICAL GROUP | Facility: PHYSICIAN GROUP | Age: 87
End: 2022-02-16
Payer: MEDICARE

## 2022-02-16 NOTE — TELEPHONE ENCOUNTER
Aravind farfan/ alo PT   Would like verbal orders for PT - Balance and strength concerns.     He would like to work with her 2 times weekly for 7 weeks.    Aravind # [id number]    I did give verbal ok.

## 2022-02-17 ENCOUNTER — HOSPITAL ENCOUNTER (OUTPATIENT)
Dept: LAB | Facility: MEDICAL CENTER | Age: 87
End: 2022-02-17
Attending: INTERNAL MEDICINE
Payer: MEDICARE

## 2022-02-17 LAB
ALBUMIN SERPL BCP-MCNC: 4.2 G/DL (ref 3.2–4.9)
ALBUMIN/GLOB SERPL: 1.4 G/DL
ALP SERPL-CCNC: 42 U/L (ref 30–99)
ALT SERPL-CCNC: 7 U/L (ref 2–50)
ANION GAP SERPL CALC-SCNC: 12 MMOL/L (ref 7–16)
AST SERPL-CCNC: 14 U/L (ref 12–45)
BASOPHILS # BLD AUTO: 0.4 % (ref 0–1.8)
BASOPHILS # BLD: 0.03 K/UL (ref 0–0.12)
BILIRUB SERPL-MCNC: 0.4 MG/DL (ref 0.1–1.5)
BUN SERPL-MCNC: 24 MG/DL (ref 8–22)
CALCIUM SERPL-MCNC: 10 MG/DL (ref 8.5–10.5)
CHLORIDE SERPL-SCNC: 100 MMOL/L (ref 96–112)
CO2 SERPL-SCNC: 26 MMOL/L (ref 20–33)
CREAT SERPL-MCNC: 1.26 MG/DL (ref 0.5–1.4)
EOSINOPHIL # BLD AUTO: 0.46 K/UL (ref 0–0.51)
EOSINOPHIL NFR BLD: 6.6 % (ref 0–6.9)
ERYTHROCYTE [DISTWIDTH] IN BLOOD BY AUTOMATED COUNT: 48.7 FL (ref 35.9–50)
GLOBULIN SER CALC-MCNC: 3 G/DL (ref 1.9–3.5)
GLUCOSE SERPL-MCNC: 114 MG/DL (ref 65–99)
HCT VFR BLD AUTO: 36.8 % (ref 37–47)
HGB BLD-MCNC: 11.9 G/DL (ref 12–16)
IMM GRANULOCYTES # BLD AUTO: 0.03 K/UL (ref 0–0.11)
IMM GRANULOCYTES NFR BLD AUTO: 0.4 % (ref 0–0.9)
LIPASE SERPL-CCNC: 40 U/L (ref 11–82)
LYMPHOCYTES # BLD AUTO: 0.75 K/UL (ref 1–4.8)
LYMPHOCYTES NFR BLD: 10.7 % (ref 22–41)
MCH RBC QN AUTO: 31.1 PG (ref 27–33)
MCHC RBC AUTO-ENTMCNC: 32.3 G/DL (ref 33.6–35)
MCV RBC AUTO: 96.1 FL (ref 81.4–97.8)
MONOCYTES # BLD AUTO: 0.76 K/UL (ref 0–0.85)
MONOCYTES NFR BLD AUTO: 10.9 % (ref 0–13.4)
NEUTROPHILS # BLD AUTO: 4.95 K/UL (ref 2–7.15)
NEUTROPHILS NFR BLD: 71 % (ref 44–72)
NRBC # BLD AUTO: 0 K/UL
NRBC BLD-RTO: 0 /100 WBC
PLATELET # BLD AUTO: 285 K/UL (ref 164–446)
PMV BLD AUTO: 11 FL (ref 9–12.9)
POTASSIUM SERPL-SCNC: 4.6 MMOL/L (ref 3.6–5.5)
PROT SERPL-MCNC: 7.2 G/DL (ref 6–8.2)
RBC # BLD AUTO: 3.83 M/UL (ref 4.2–5.4)
SODIUM SERPL-SCNC: 138 MMOL/L (ref 135–145)
WBC # BLD AUTO: 7 K/UL (ref 4.8–10.8)

## 2022-02-17 PROCEDURE — 36415 COLL VENOUS BLD VENIPUNCTURE: CPT

## 2022-02-17 PROCEDURE — 83690 ASSAY OF LIPASE: CPT

## 2022-02-17 PROCEDURE — 80053 COMPREHEN METABOLIC PANEL: CPT

## 2022-02-17 PROCEDURE — 85025 COMPLETE CBC W/AUTO DIFF WBC: CPT

## 2022-02-18 ENCOUNTER — TELEPHONE (OUTPATIENT)
Dept: MEDICAL GROUP | Facility: PHYSICIAN GROUP | Age: 87
End: 2022-02-18
Payer: MEDICARE

## 2022-02-18 NOTE — TELEPHONE ENCOUNTER
Received message from IRVING Moran with [facility] requesting verbal orders to discontinue home health aid services to assist with bathing once per week.  Patient no longer wants this service.    Verbal orders provided.

## 2022-02-28 ENCOUNTER — OFFICE VISIT (OUTPATIENT)
Dept: SLEEP MEDICINE | Facility: MEDICAL CENTER | Age: 87
End: 2022-02-28
Payer: MEDICARE

## 2022-02-28 VITALS
HEIGHT: 63 IN | RESPIRATION RATE: 14 BRPM | WEIGHT: 171 LBS | DIASTOLIC BLOOD PRESSURE: 50 MMHG | BODY MASS INDEX: 30.3 KG/M2 | OXYGEN SATURATION: 86 % | SYSTOLIC BLOOD PRESSURE: 136 MMHG | HEART RATE: 66 BPM

## 2022-02-28 DIAGNOSIS — R09.02 HYPOXEMIA: ICD-10-CM

## 2022-02-28 DIAGNOSIS — R06.02 SOB (SHORTNESS OF BREATH): ICD-10-CM

## 2022-02-28 PROCEDURE — 94761 N-INVAS EAR/PLS OXIMETRY MLT: CPT | Performed by: INTERNAL MEDICINE

## 2022-02-28 PROCEDURE — 99214 OFFICE O/P EST MOD 30 MIN: CPT | Performed by: INTERNAL MEDICINE

## 2022-02-28 RX ORDER — NEBIVOLOL 5 MG/1
TABLET ORAL
COMMUNITY
Start: 2022-02-22 | End: 2022-07-28

## 2022-02-28 ASSESSMENT — FIBROSIS 4 INDEX: FIB4 SCORE: 1.69

## 2022-02-28 NOTE — PROGRESS NOTES
Chief Complaint   Patient presents with   • Follow-Up     Increased SOB; Last seen 2/10/22       HPI:  The patient is a 89-year-old woman who has been followed for possible reactive airways disease.  She has not tolerated inhaled steroids because of voice issues.  She uses albuterol on an as-needed basis.  Over the past month or so she has noted increased shortness of breath with walking.  She has not been complaining of wheezing.  Recent spirometry shows a stable FEV1 and FVC.  The patient has a history of hypertension and mild aortic insufficiency and is followed by Dr. العلي at [facility].  Her echocardiogram in August 2021 showed a normal ejection fraction with no right-sided abnormalities.  Prior echocardiograms have suggested diastolic dysfunction.  She does have a history of an RVSP of 38 mmHg.  She has no history of fever, chills, cough or sputum production.  She was started on Bystolic by Dr. العلي recently however her shortness of breath preceded using this medication.  As noted by Dr. Murray she was hospitalized last year for GI problems and volume depletion.  In the office she is in no distress at rest.  Her initial oxygen saturation was 86%.  After a bit it was repeated and was found to be 93% at rest.  However she desaturated quickly to 88% with walking.  On supplemental oxygen at 2 L/min her resting oxygen saturation was 96% and with ambulation 94%.    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   • Aortic regurgitation 10/4/2012   • ASTHMA    • Breast cancer ([facility]) 8/8/2012   • Bronchitis    • Cancer ([facility])    • Carotid artery plaque 10/4/2012   • Chickenpox    • Depression 8/8/2012   • Diabetes    • Diabetes mellitus type 2 in nonobese ([facility]) 8/8/2012   • GERD (gastroesophageal reflux disease)    • [place] measles    • Glaucoma    • History of colonoscopy 8/8/2012   • History of mammogram 8/8/2012   • Hyperlipidemia  8/8/2012   • Hypertension 8/8/2012   • Hypothyroidism 8/8/2012   • Influenza    • Mumps    • Nasal drainage    • Obesity    • Rosacea    • Thyroid disease    • Whooping cough        ROS:   Constitutional: Denies fevers, chills, night sweats, fatigue or weight loss  Eyes: Denies vision loss, pain, drainage, double vision  Ears, Nose, Throat: Denies earache, tinnitus, hoarseness  Cardiovascular: Denies chest pain, tightness, palpitations at this time  Respiratory: See HPI  Sleep: Denies, snoring, apnea  GI: Denies abdominal pain, nausea, vomiting, diarrhea  : Denies frequent urination, hematuria, painful urination  Musculoskeletal: Denies back pain, painful joints, sore muscles  Neurological: Denies headaches, seizures  Skin: Denies rashes, color changes  Psychiatric: Denies depression or thoughts of suicide  Hematologic: Denies bleeding tendency or clotting tendency  Allergic/Immunologic: Denies rhinitis, skin sensitivity    Social History     Socioeconomic History   • Marital status: Single     Spouse name: Not on file   • Number of children: Not on file   • Years of education: Not on file   • Highest education level: Not on file   Occupational History   • Not on file   Tobacco Use   • Smoking status: Never Smoker   • Smokeless tobacco: Never Used   Vaping Use   • Vaping Use: Never used   Substance and Sexual Activity   • Alcohol use: No   • Drug use: No   • Sexual activity: Not Currently   Other Topics Concern   • Not on file   Social History Narrative   • Not on file     Social Determinants of Health     Financial Resource Strain: Low Risk    • Difficulty of Paying Living Expenses: Not hard at all   Food Insecurity: No Food Insecurity   • Worried About Running Out of Food in the Last Year: Never true   • Ran Out of Food in the Last Year: Never true   Transportation Needs: No Transportation Needs   • Lack of Transportation (Medical): No   • Lack of Transportation (Non-Medical): No   Physical Activity: Not on file    Stress: Not on file   Social Connections: Not on file   Intimate Partner Violence: Not on file   Housing Stability: Unknown   • Unable to Pay for Housing in the Last Year: No   • Number of Places Lived in the Last Year: Not on file   • Unstable Housing in the Last Year: No     Bacitracin, Biaxin [clarithromycin], Linzess [linaclotide], Pcn [penicillins], and Sulfa drugs  Current Outpatient Medications on File Prior to Visit   Medication Sig Dispense Refill   • nebivolol (BYSTOLIC) 5 MG Tab tablet      • albuterol 108 (90 Base) MCG/ACT Aero Soln inhalation aerosol Inhale 2 Puffs every 6 hours as needed. 1 Each 11   • levothyroxine (SYNTHROID) 75 MCG Tab      • losartan (COZAAR) 100 MG Tab      • metFORMIN ER (GLUCOPHAGE XR) 500 MG TABLET SR 24 HR      • rosuvastatin (CRESTOR) 10 MG Tab      • diltiazem CD (CARDIZEM CD) 360 MG ER capsule      • Calcium Citrate-Vitamin D (CALCIUM CITRATE +D PO) Take  by mouth.     • metronidazole (METROGEL) 0.75 % gel Apply  topically 2 times a day.     • Aflibercept (EYLEA IO) Inject  into the eye.     • Probiotic Product (ALIGN) 4 MG Cap Take 1 capsule by mouth every day.     • aspirin 81 MG EC tablet Take  by mouth.     • azelastine (ASTELIN) 137 MCG/SPRAY nasal spray Administer 1 Spray into affected nostril(S) 2 times a day.     • fenofibrate (TRIGLIDE) 160 MG tablet Take 160 mg by mouth every day.     • Clobetasol Prop-Niacinamide 0.05-4 % Ointment Apply  topically.     • fexofenadine (ALLEGRA) 180 MG tablet Take 180 mg by mouth every day.     • fluticasone (FLONASE) 50 MCG/ACT nasal spray Administer 1 Spray into affected nostril(S) every day.     • Multiple Vitamins-Minerals (PRESERVISION AREDS 2 PO) Take  by mouth.     • montelukast (SINGULAIR) 10 MG TABS Take 1 Tab by mouth every day. 90 Tab 3   • escitalopram (LEXAPRO) 10 MG TABS Take 10 mg by mouth every day.     • levoFLOXacin (LEVAQUIN) 500 MG tablet  (Patient not taking: Reported on 2/10/2022)     • ondansetron (ZOFRAN  "ODT) 4 MG TABLET DISPERSIBLE      • polyethylene glycol/lytes (MIRALAX) 17 g Pack Take 17 g by mouth every day.     • Rosuvastatin Calcium 10 MG CAPSULE SPRINKLE Take  by mouth.     • Cholecalciferol (D3 HIGH POTENCY) 2000 UNIT Cap Take 2,000 Units by mouth every day.     • metformin (GLUCOPHAGE) 500 MG TABS Take 1 Tab by mouth 2 times a day, with meals. 180 Tab 3     No current facility-administered medications on file prior to visit.     /50 (BP Location: Right arm, Patient Position: Sitting, BP Cuff Size: Adult)   Pulse 66   Resp 14   Ht 1.6 m (5' 3\")   Wt 77.6 kg (171 lb)   SpO2 (!) 86%   Family History   Problem Relation Age of Onset   • Heart Disease Mother          age 88, CAD   • GI Disease Father         cirrhosis, CHF   • Heart Disease Father    • Heart Failure Father    • Other Father        Physical Exam:  Elderly, no acute distress  HEENT: PERRLA, EOMI, no scleral icterus, no nasal or oral lesions  Neck: No thyromegaly, no adenopathy, no bruits  M[place]ampatti: Grade II  Lungs: Equal breath sounds, no wheezes or crackles  Heart: Regular rate and rhythm, no gallops or murmurs on today's exam  Abdomen: Soft, benign, no organomegaly  Extremities: No clubbing, cyanosis, or edema  Neurologic: Cranial nerve, motor, and sensory exam are normal    1. SOB (shortness of breath)    2. Hypoxemia        She has some mild obstructive lung disease but this seems controlled at this time.  She is not wheezing and and has no evidence of current cardiac arrhythmia or congestive heart failure.  She does have oxygen desaturation with ambulation and feels better with the supplemental oxygen.  For now we will make no changes in her medication.  We have ordered supplemental oxygen for night time use and with activity.  We will bring her back in 2 months for repeat evaluation.  "

## 2022-02-28 NOTE — PROCEDURES
Multi-Ox Readings  Multi Ox #1 Room air   O2 sat % at rest 94   O2 sat % on exertion 88   O2 sat average on exertion     Multi Ox #2 2 LPM   O2 sat % at rest 96   O2 sat % on exertion 94   O2 sat average on exertion 95     Oxygen Use     Oxygen Frequency     Duration of need     Is the patient mobile within the home?     CPAP Use?     BIPAP Use?     Servo Titration

## 2022-03-15 ENCOUNTER — PATIENT MESSAGE (OUTPATIENT)
Dept: SLEEP MEDICINE | Facility: MEDICAL CENTER | Age: 87
End: 2022-03-15
Payer: MEDICARE

## 2022-03-16 ENCOUNTER — HOSPITAL ENCOUNTER (OUTPATIENT)
Dept: RADIOLOGY | Facility: MEDICAL CENTER | Age: 87
End: 2022-03-16
Payer: MEDICARE

## 2022-03-24 NOTE — PATIENT COMMUNICATION
Called and spoke with [facility] re: pt. She said she don't have pt in her system and does not have an Order.    Re-Faxed order to [facility]

## 2022-03-25 RX ORDER — ESCITALOPRAM OXALATE 10 MG/1
10 TABLET ORAL DAILY
Qty: 90 TABLET | Refills: 3 | Status: SHIPPED | OUTPATIENT
Start: 2022-03-25 | End: 2022-03-29 | Stop reason: SDUPTHER

## 2022-03-25 NOTE — TELEPHONE ENCOUNTER
Received request via: Pharmacy    Was the patient seen in the last year in this department? Yes    Does the patient have an active prescription (recently filled or refills available) for medication(s) requested? No     Last ov: 1/20/22  Next ov: 4/21/22

## 2022-03-29 ENCOUNTER — PATIENT MESSAGE (OUTPATIENT)
Dept: SLEEP MEDICINE | Facility: MEDICAL CENTER | Age: 87
End: 2022-03-29
Payer: MEDICARE

## 2022-03-29 ENCOUNTER — PATIENT MESSAGE (OUTPATIENT)
Dept: MEDICAL GROUP | Facility: PHYSICIAN GROUP | Age: 87
End: 2022-03-29
Payer: MEDICARE

## 2022-03-29 DIAGNOSIS — J98.4 RESTRICTIVE AIRWAY DISEASE: ICD-10-CM

## 2022-03-29 DIAGNOSIS — F32.89 OTHER DEPRESSION: ICD-10-CM

## 2022-03-29 RX ORDER — ALBUTEROL SULFATE 90 UG/1
2 AEROSOL, METERED RESPIRATORY (INHALATION) EVERY 6 HOURS PRN
Qty: 3 EACH | Refills: 3 | Status: SHIPPED | OUTPATIENT
Start: 2022-03-29 | End: 2022-06-03

## 2022-03-29 NOTE — PATIENT COMMUNICATION
Received request via: Patient    Was the patient seen in the last year in this department? Yes  Last OV 1/20/22  Next OV 4/21/22    Does the patient have an active prescription (recently filled or refills available) for medication(s) requested? Patient is requesting a different pharmacy    Requested Prescriptions     Pending Prescriptions Disp Refills   • escitalopram (LEXAPRO) 10 MG Tab 90 Tablet 3     Sig: Take 1 Tablet by mouth every day.

## 2022-03-30 RX ORDER — ESCITALOPRAM OXALATE 10 MG/1
10 TABLET ORAL DAILY
Qty: 90 TABLET | Refills: 3 | Status: SHIPPED | OUTPATIENT
Start: 2022-03-30 | End: 2022-06-30

## 2022-04-07 ENCOUNTER — TELEPHONE (OUTPATIENT)
Dept: MEDICAL GROUP | Facility: PHYSICIAN GROUP | Age: 87
End: 2022-04-07
Payer: MEDICARE

## 2022-04-07 NOTE — TELEPHONE ENCOUNTER
Katy farfan/ [facility] [phone]  would like get extended order for pt he is requesting 4 more weeks for strength      PT and Home Health.     Thank you

## 2022-04-14 RX ORDER — LEVOTHYROXINE SODIUM 0.07 MG/1
75 TABLET ORAL
Qty: 90 TABLET | Refills: 3 | Status: SHIPPED | OUTPATIENT
Start: 2022-04-14 | End: 2023-05-30 | Stop reason: SDUPTHER

## 2022-04-14 NOTE — TELEPHONE ENCOUNTER
Received request via: Pharmacy    Was the patient seen in the last year in this department? Yes    Does the patient have an active prescription (recently filled or refills available) for medication(s) requested? No     Last Office Visit:01/20/2022  Last Labs:02/17/2022

## 2022-04-20 ENCOUNTER — PATIENT MESSAGE (OUTPATIENT)
Dept: SLEEP MEDICINE | Facility: MEDICAL CENTER | Age: 87
End: 2022-04-20
Payer: MEDICARE

## 2022-04-21 ENCOUNTER — PATIENT MESSAGE (OUTPATIENT)
Dept: MEDICAL GROUP | Facility: PHYSICIAN GROUP | Age: 87
End: 2022-04-21
Payer: MEDICARE

## 2022-04-21 NOTE — PATIENT COMMUNICATION
Phone Number Called: [phone] (home)     Call outcome: Left detailed message for patient. Informed to call back with any additional questions.    Message: Called to inform patient that Dr. Kaufman would like her to go to the Urgent Care in [place] to be evaluated for c-diff.

## 2022-04-22 ENCOUNTER — TELEPHONE (OUTPATIENT)
Dept: SLEEP MEDICINE | Facility: MEDICAL CENTER | Age: 87
End: 2022-04-22
Payer: MEDICARE

## 2022-04-22 DIAGNOSIS — R19.7 DIARRHEA OF PRESUMED INFECTIOUS ORIGIN: ICD-10-CM

## 2022-04-22 DIAGNOSIS — T36.8X5A: ICD-10-CM

## 2022-04-22 NOTE — TELEPHONE ENCOUNTER
VOICEMAIL  1. Caller Name: Kristina Dillard - [phone]    2. Message: Voicemail received on 4/20/22 stating that she has called multiple times to inform office that patient does not currently qualify for o2 with the diagnosis provided.  Please call Kristina with new diagnosis.

## 2022-04-25 NOTE — PATIENT COMMUNICATION
Lab and imaging orders faxed to [facility] and mailed to patient.  Patient informed.  Patient states BMs improved to 3 times per day since stopping antibiotic.  She reports she is feeling okay and staying hydrated.

## 2022-04-27 NOTE — TELEPHONE ENCOUNTER
Left vm with [facility]'s answering service. Returning Kristina's call. Informed provided and encouraged a fax if anything else needed.

## 2022-04-28 NOTE — TELEPHONE ENCOUNTER
VOICEMAIL  1. Caller Name: Kristina Dillard [phone]    2. Message: Voicemail received on 4/27/22 stating again this patient does not currently qualify for o2 with current diagnosis provided.  Please call her with new DX.

## 2022-04-28 NOTE — TELEPHONE ENCOUNTER
Will need a new order with a different diagnosis. Diagnosis must be a chronic pulmonary or chronic cardiac. Please advise.

## 2022-04-29 DIAGNOSIS — J96.11 CHRONIC RESPIRATORY FAILURE WITH HYPOXIA (HCC): ICD-10-CM

## 2022-05-04 ENCOUNTER — TELEPHONE (OUTPATIENT)
Dept: MEDICAL GROUP | Facility: PHYSICIAN GROUP | Age: 87
End: 2022-05-04
Payer: MEDICARE

## 2022-05-04 NOTE — TELEPHONE ENCOUNTER
1. Caller Name: Amada Xavier                          Call Back Number: [phone] (home)     Tenzin farfan/ [facility]   called for verbal orders for       Home health & PT  Pt is still home bound and they would like    3weeks 2 times a week     Provided verbal auth

## 2022-05-09 ENCOUNTER — OFFICE VISIT (OUTPATIENT)
Dept: SLEEP MEDICINE | Facility: MEDICAL CENTER | Age: 87
End: 2022-05-09
Payer: MEDICARE

## 2022-05-09 VITALS
SYSTOLIC BLOOD PRESSURE: 122 MMHG | RESPIRATION RATE: 14 BRPM | HEART RATE: 67 BPM | TEMPERATURE: 97.9 F | OXYGEN SATURATION: 92 % | WEIGHT: 170.6 LBS | DIASTOLIC BLOOD PRESSURE: 70 MMHG | BODY MASS INDEX: 30.23 KG/M2 | HEIGHT: 63 IN

## 2022-05-09 DIAGNOSIS — I35.1 AORTIC VALVE INSUFFICIENCY, ETIOLOGY OF CARDIAC VALVE DISEASE UNSPECIFIED: ICD-10-CM

## 2022-05-09 DIAGNOSIS — J45.20 MILD INTERMITTENT ASTHMA WITHOUT COMPLICATION: ICD-10-CM

## 2022-05-09 DIAGNOSIS — R09.02 HYPOXEMIA: ICD-10-CM

## 2022-05-09 DIAGNOSIS — R06.02 SOB (SHORTNESS OF BREATH): ICD-10-CM

## 2022-05-09 DIAGNOSIS — J96.11 CHRONIC RESPIRATORY FAILURE WITH HYPOXIA (HCC): ICD-10-CM

## 2022-05-09 PROCEDURE — 99213 OFFICE O/P EST LOW 20 MIN: CPT | Performed by: INTERNAL MEDICINE

## 2022-05-09 ASSESSMENT — FIBROSIS 4 INDEX: FIB4 SCORE: 1.69

## 2022-05-10 NOTE — PROGRESS NOTES
Chief Complaint   Patient presents with   • Shortness of Breath       HPI:  The patient is a 89-year-old woman who has been followed for possible reactive airways disease.  She has not tolerated inhaled steroids because of voice issues.  She uses albuterol on an as-needed basis.  Over the past month or so she has noted increased shortness of breath with walking.  She has not been complaining of wheezing.  Recent spirometry shows a stable FEV1 and FVC.  The patient has a history of hypertension and mild aortic insufficiency and is followed by Dr. العلي at [facility].  Her echocardiogram in August 2021 showed a normal ejection fraction with no right-sided abnormalities.  Prior echocardiograms have suggested diastolic dysfunction.  She does have a history of an RVSP of 38 mmHg.  She has no history of fever, chills, cough or sputum production.  She was started on Bystolic by Dr. العلي recently however her shortness of breath preceded using this medication.  As noted by Dr. Murray she was hospitalized last year for GI problems and volume depletion.  In the office she is in no distress at rest.  Her previous oximetry demonstrated a resting oxygen saturation of 86%.  After a bit it was repeated and was found to be 93% at rest.  However she desaturated quickly to 88% with walking.  On supplemental oxygen at 2 L/min her resting oxygen saturation was 96% and with ambulation 94%.    She returns and her resting oxygen saturation is now 92%.  She does mildly desaturate to 88% with walking but recovers quickly.  We discussed supplemental oxygen we will not go forward with that at this time.    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   • Aortic regurgitation 10/4/2012   • ASTHMA    • Breast cancer (HCC) 8/8/2012   • Bronchitis    • Cancer (HCC)    • Carotid artery plaque 10/4/2012   • Chickenpox    • Depression 8/8/2012   • Diabetes    •  Diabetes mellitus type 2 in nonobese ([facility]) 8/8/2012   • GERD (gastroesophageal reflux disease)    • Mongolian measles    • Glaucoma    • History of colonoscopy 8/8/2012   • History of mammogram 8/8/2012   • Hyperlipidemia 8/8/2012   • Hypertension 8/8/2012   • Hypothyroidism 8/8/2012   • Influenza    • Mumps    • Nasal drainage    • Obesity    • Rosacea    • Thyroid disease    • Whooping cough        ROS:   Constitutional: Denies fevers, chills, night sweats, fatigue or weight loss  Eyes: Denies vision loss, pain, drainage, double vision  Ears, Nose, Throat: Denies earache, tinnitus, hoarseness  Cardiovascular: Denies chest pain, tightness, palpitations at this time  Respiratory: See HPI  Sleep: Denies, snoring, apnea  GI: Denies abdominal pain, nausea, vomiting, diarrhea  : Denies frequent urination, hematuria, painful urination  Musculoskeletal: Denies back pain, painful joints, sore muscles  Neurological: Denies headaches, seizures  Skin: Denies rashes, color changes  Psychiatric: Denies depression or thoughts of suicide  Hematologic: Denies bleeding tendency or clotting tendency  Allergic/Immunologic: Denies rhinitis, skin sensitivity    Social History     Socioeconomic History   • Marital status: Single     Spouse name: Not on file   • Number of children: Not on file   • Years of education: Not on file   • Highest education level: Not on file   Occupational History   • Not on file   Tobacco Use   • Smoking status: Never Smoker   • Smokeless tobacco: Never Used   Vaping Use   • Vaping Use: Never used   Substance and Sexual Activity   • Alcohol use: No   • Drug use: No   • Sexual activity: Not Currently   Other Topics Concern   • Not on file   Social History Narrative   • Not on file     Social Determinants of Health     Financial Resource Strain: Low Risk    • Difficulty of Paying Living Expenses: Not hard at all   Food Insecurity: No Food Insecurity   • Worried About Running Out of Food in the Last Year: Never  true   • Ran Out of Food in the Last Year: Never true   Transportation Needs: No Transportation Needs   • Lack of Transportation (Medical): No   • Lack of Transportation (Non-Medical): No   Physical Activity: Not on file   Stress: Not on file   Social Connections: Not on file   Intimate Partner Violence: Not on file   Housing Stability: Unknown   • Unable to Pay for Housing in the Last Year: No   • Number of Places Lived in the Last Year: Not on file   • Unstable Housing in the Last Year: No     Atorvastatin, Bacitracin, Linaclotide, Pcn [penicillins], and Sulfa drugs  Current Outpatient Medications on File Prior to Visit   Medication Sig Dispense Refill   • levothyroxine (SYNTHROID) 75 MCG Tab Take 1 Tablet by mouth 1 time a day as needed. 90 Tablet 3   • Aflibercept (EYLEA) 2 MG/0.05ML Solution inject 0.05 milliliter by intravitreal route  every 4 weeks for 5 doses then every 8 weeks     • albuterol 108 (90 Base) MCG/ACT Aero Soln inhalation aerosol Inhale 2 Puffs every day.     • aspirin 81 MG EC tablet Take 1 Tablet by mouth every day.     • azelastine (ASTELIN) 137 MCG/SPRAY nasal spray Administer 1 Spray into affected nostril(S) 2 times a day.     • Calcium Citrate-Vitamin D 200-125 MG-UNIT Tab      • vitamin D (VITAMIND D3) 1000 UNIT Tab Take 1,000 Units by mouth.     • escitalopram (LEXAPRO) 10 MG Tab Take 10 mg by mouth every day.     • fluticasone (FLONASE) 50 MCG/ACT nasal spray Administer 1 Spray into affected nostril(S) every day.     • Probiotic Product (ALIGN) Cap Take 1 Capsule by mouth every day.     • nebivolol (BYSTOLIC) 5 MG Tab tablet      • losartan (COZAAR) 100 MG Tab      • metFORMIN ER (GLUCOPHAGE XR) 500 MG TABLET SR 24 HR      • rosuvastatin (CRESTOR) 10 MG Tab      • diltiazem CD (CARDIZEM CD) 360 MG ER capsule      • metronidazole (METROGEL) 0.75 % gel Apply  topically 2 times a day.     • fenofibrate (TRIGLIDE) 160 MG tablet Take 160 mg by mouth every day.     • Cholecalciferol (D3 HIGH  "POTENCY) 2000 UNIT Cap Take 2,000 Units by mouth every day.     • Clobetasol Prop-Niacinamide 0.05-4 % Ointment Apply  topically.     • Multiple Vitamins-Minerals (PRESERVISION AREDS 2 PO) Take  by mouth.     • montelukast (SINGULAIR) 10 MG TABS Take 1 Tab by mouth every day. 90 Tab 3   • escitalopram (LEXAPRO) 10 MG Tab Take 1 Tablet by mouth every day. 90 Tablet 3   • albuterol 108 (90 Base) MCG/ACT Aero Soln inhalation aerosol Inhale 2 Puffs every 6 hours as needed. 3 Each 3   • levoFLOXacin (LEVAQUIN) 500 MG tablet  (Patient not taking: Reported on 2/10/2022)     • ondansetron (ZOFRAN ODT) 4 MG TABLET DISPERSIBLE      • polyethylene glycol/lytes (MIRALAX) 17 g Pack Take 17 g by mouth every day.     • Calcium Citrate-Vitamin D (CALCIUM CITRATE +D PO) Take  by mouth.     • Aflibercept (EYLEA IO) Inject  into the eye.     • Probiotic Product (ALIGN) 4 MG Cap Take 1 capsule by mouth every day.     • aspirin 81 MG EC tablet Take  by mouth.     • azelastine (ASTELIN) 137 MCG/SPRAY nasal spray Administer 1 Spray into affected nostril(S) 2 times a day.     • Rosuvastatin Calcium 10 MG CAPSULE SPRINKLE Take  by mouth.     • fexofenadine (ALLEGRA) 180 MG tablet Take 180 mg by mouth every day.     • fluticasone (FLONASE) 50 MCG/ACT nasal spray Administer 1 Spray into affected nostril(S) every day.     • metformin (GLUCOPHAGE) 500 MG TABS Take 1 Tab by mouth 2 times a day, with meals. 180 Tab 3     No current facility-administered medications on file prior to visit.     /70 (BP Location: Right arm, Patient Position: Sitting)   Pulse 67   Temp 36.6 °C (97.9 °F) (Temporal)   Resp 14   Ht 1.6 m (5' 3\")   Wt 77.4 kg (170 lb 9.6 oz)   SpO2 92%   Family History   Problem Relation Age of Onset   • Heart Disease Mother          age 88, CAD   • GI Disease Father         cirrhosis, CHF   • Heart Disease Father    • Heart Failure Father    • Other Father        Physical Exam:  Elderly, no acute distress  HEENT: MAYITO, " EOMI, no scleral icterus, no nasal or oral lesions  Neck: No thyromegaly, no adenopathy, no bruits  Mallampatti: Grade II  Lungs: Equal breath sounds, no wheezes or crackles  Heart: Regular rate and rhythm, no gallops or murmurs on today's exam  Abdomen: Soft, benign, no organomegaly  Extremities: No clubbing, cyanosis, or edema  Neurologic: Cranial nerve, motor, and sensory exam are normal    1. Chronic respiratory failure with hypoxia (HCC)    2. SOB (shortness of breath)    3. Hypoxemia    4. Mild intermittent asthma without complication    5. Aortic valve insufficiency, etiology of cardiac valve disease unspecified      She does have some mild obstructive lung disease but this is well controlled with occasional albuterol.  Her oxygen desaturation is mild and we will not order supplemental oxygen at this time.  We will see her back for repeat evaluation in 6 months or so.

## 2022-05-17 NOTE — TELEPHONE ENCOUNTER
Appears patient has pending appt;    With Pulmonary and Sleep Medicine (SPIROMETRY/6MW)  08/10/2022 at 9:30 AM

## 2022-05-26 DIAGNOSIS — M25.572 ACUTE LEFT ANKLE PAIN: ICD-10-CM

## 2022-05-26 DIAGNOSIS — S93.402S SPRAIN OF LEFT ANKLE, UNSPECIFIED LIGAMENT, SEQUELA: Primary | ICD-10-CM

## 2022-06-13 ENCOUNTER — TELEPHONE (OUTPATIENT)
Dept: MEDICAL GROUP | Facility: PHYSICIAN GROUP | Age: 87
End: 2022-06-13
Payer: MEDICARE

## 2022-06-13 NOTE — TELEPHONE ENCOUNTER
[facility] is requesting orders for  1) Right breast ultrasound  2) right breast diagnostic mammogram    Due to patient's mammogram being abnormal.    Fax#: [phone]

## 2022-06-14 DIAGNOSIS — R92.8 ABNORMAL MAMMOGRAM OF RIGHT BREAST: ICD-10-CM

## 2022-06-14 NOTE — TELEPHONE ENCOUNTER
Fractions / Week: 3 Pls fax right breast us and mammogram and let pt know to contact [facility] to schedule imaging.   Thank you.   Remberto Kaufman M.D.     Port Dimensions-Y Axis In Cm: 4.5 Bill For Dosimetry/Render Decay And Dose Adjustment Calculation In Note: No Cumulative Dose In Cgy (Optional): 3512.34 Field Size (Applicator): 5.0 cm Custom Shielding Preamble Text Will Not Be Included With Simple Simulations (.......... X X Y Cm): A lead shield of 0.762 mm thickness is utilized to form a molded, custom shield with a Fractions / Week Rx 4: 5 Total Number Of Fractions Rx 3: 15 Number Of Days Off Treatment: 1 Include Rx 3 When Rendering Additional Prescriptions: Yes Comments: RTOG 2 Total Dose (Optional-Please Include Units): 5403.60cGy Energy (Optional-Please Include Units): 70kV Treatment Device Design After Initial Simulation Justification (Will Render If Bill For Treatment Devices = Yes): The patient is status post radiation simulation and is evaluated as to the use of additional devices for shielding and placement for radiation therapy. Treatment Time In Min (Optional): 0.38 Information: Selecting Yes will display possible errors in your note based on the variables you have selected. This validation is only offered as a suggestion for you. PLEASE NOTE THAT THE VALIDATION TEXT WILL BE REMOVED WHEN YOU FINALIZE YOUR NOTE. IF YOU WANT TO FAX A PRELIMINARY NOTE YOU WILL NEED TO TOGGLE THIS TO 'NO' IF YOU DO NOT WANT IT IN YOUR FAXED NOTE. Limb Positioning Or Elevation: Reclined Treatment Time / Fractionation (Optional- Include Units): 0.38min Simple Simulation Preamble Text Will Be Included With Simple Simulations (.......... Indications): Simple simulation was performed today for the following reasons: Assessment: Appropriate reaction Detail Level: Detailed Time Dose Fractionation (Optional- Include Units If Applicable): 95 Fractionation Number: 13 Dimensions-X Axis In Cm: 2.5 Dose Per Fractionation In Cgy (Optional): 270.18cGy Custom Shielding Afterword Text Will Not Be Included With Simple Simulations (X X Y Cm............): port to correlate with the lesion size, including treatment margin. The custom lead shield is adequate to accommodate the appropriate applicator and provide adequate shielding around the treatment site. Additional shielding (as noted below) is used to protect sensitive, normal tissues. Patient Positioning: Supine Intro Statement (Will Not Render If Left Blank): The patient is undergoing superficial radiation therapy for skin cancer and presents for weekly evaluation and management.  Per protocol and as documented on the flow sheet, the patient was questioned as to subjective redness, pruritus, pain, drainage, fatigue, or any other symptoms.  Objectively, the radiation area was evaluated with regards to erythema, atrophy, scale, crusting, erosion, ulceration, edema, purpura, tenderness, warmth, drainage, and any other findings.  The plan was extensively reviewed including the dose, and dosing schedule.  The simulation and clinical setup was also reviewed as was the external and any internal shields and based on this review the appropriateness and sufficiency of treatment was determined. Dose / Tx In Cgy (Optional): 270.18 Depth (Optional-Please Include Units): NA Additional Prescription Justification Text: If there is any interruption in treatment exceeding 5 days please see Decay and Dose Adjustment Calculation and complete treatment under Prescription 2, 3 or 4 as appropriate. Simple Simulation Afterword Text Will Be Included With Simple Simulations (Indications............): The patient had a complete consultation regarding all applicable modalities for the treatment of their lesion and based on a variety of factors including the type of lesion, size, and location, the relevant medical history as well as local tissue factors, the functional status of the individual, the ability to perform necessary postoperative wound instructions and the need for simultaneous treatments as well as overall wound healing status, it was determined that the patient would begin radiation therapy treatment for skin cancer.  A full simulation and treatment device design was performed including the determination and formulation of appropriate simple and complex devices including lead shield of 2.286 mm thickness to form molded customized shielding to specifically correlate with the lesion size including treatment margin.  The custom lead shield is adequate to accommodate the appropriate applicator and provide adequate shielding around the treatment site.  The specific field applicator, shields, and devices both simple and complex as well as the specific patient setup is outlined below.  The patient was given a full consent for superficial radiation to both verbally and in writing and the full determination of patient's eligibility for treatment and selection is outlined on the patient eligibility and treatment selection form.  The specific superficial radiotherapy prescription was determined and was documented on the superficial radiotherapy prescription form.  A treatment calculation was also performed and documented on the treatment calculation form.  Based on the prescription, the patient was scheduled for a series of fractional treatments. Functional Status: 1 (ambulatory, light activity) Total Number Of Fractions: 20 Shielding Size (Optional- Include Units): 2.5x3.0 Day Of The Week Treatment Administered: Monday Initial Radiation Treatment Planning (Will Render If Bill Simulation = Yes): The patient had a complete consultation regarding all applicable modalities for the treatment of their skin cancer and based on a variety of factors including the type of lesion, size, and location, the relevant medical history as well as local tissue factors, the functional status of the individual, the ability to perform necessary postoperative wound instructions and the need for simultaneous treatments as well as overall wound healing status, it was determined that the patient would begin radiation therapy treatment for skin cancer.  A full simulation and treatment device design was performed including the determination and formulation of appropriate simple and complex devices including lead shield of 0.762 mm thickness to form molded customized shielding to specifically correlate with the lesion size including treatment margin.  The custom lead shield is adequate to accommodate the appropriate applicator and provide adequate shielding around the treatment site.  The specific field applicator, shields, and devices both simple and complex as well as the specific patient setup is outlined below.  The patient was given a full consent for superficial radiation to both verbally and in writing and the full determination of patient's eligibility for treatment and selection is outlined on the patient eligibility and treatment selection form.  The specific superficial radiotherapy prescription was determined and was documented on the superficial radiotherapy prescription form.  A treatment calculation was also performed and documented on the treatment calculation form.  Based on the prescription, the patient was scheduled for a series of fractional treatments. Pathology Override (Pathology Will Render As Diagnosis Name If Left Blank): Basal Cell Carcinoma - Nodular

## 2022-06-30 ENCOUNTER — OFFICE VISIT (OUTPATIENT)
Dept: MEDICAL GROUP | Facility: PHYSICIAN GROUP | Age: 87
End: 2022-06-30
Payer: MEDICARE

## 2022-06-30 VITALS
SYSTOLIC BLOOD PRESSURE: 122 MMHG | HEART RATE: 63 BPM | BODY MASS INDEX: 29.95 KG/M2 | TEMPERATURE: 97.5 F | RESPIRATION RATE: 14 BRPM | WEIGHT: 169 LBS | DIASTOLIC BLOOD PRESSURE: 70 MMHG | HEIGHT: 63 IN | OXYGEN SATURATION: 93 %

## 2022-06-30 DIAGNOSIS — D64.9 ANEMIA, UNSPECIFIED TYPE: ICD-10-CM

## 2022-06-30 DIAGNOSIS — E11.9 DIABETES MELLITUS TYPE 2 IN NONOBESE (HCC): ICD-10-CM

## 2022-06-30 DIAGNOSIS — N18.32 STAGE 3B CHRONIC KIDNEY DISEASE: ICD-10-CM

## 2022-06-30 DIAGNOSIS — Z23 NEED FOR VACCINATION: ICD-10-CM

## 2022-06-30 DIAGNOSIS — H35.30 AMD (AGE-RELATED MACULAR DEGENERATION), BILATERAL: ICD-10-CM

## 2022-06-30 DIAGNOSIS — M25.572 ACUTE LEFT ANKLE PAIN: ICD-10-CM

## 2022-06-30 LAB
HBA1C MFR BLD: 6.2 % (ref 0–5.6)
INT CON NEG: ABNORMAL
INT CON POS: ABNORMAL

## 2022-06-30 PROCEDURE — 99214 OFFICE O/P EST MOD 30 MIN: CPT | Performed by: FAMILY MEDICINE

## 2022-06-30 PROCEDURE — 83036 HEMOGLOBIN GLYCOSYLATED A1C: CPT | Performed by: FAMILY MEDICINE

## 2022-06-30 RX ORDER — CLINDAMYCIN HYDROCHLORIDE 150 MG/1
CAPSULE ORAL
COMMUNITY
Start: 2022-04-14 | End: 2022-08-25

## 2022-06-30 ASSESSMENT — FIBROSIS 4 INDEX: FIB4 SCORE: 1.69

## 2022-06-30 NOTE — ASSESSMENT & PLAN NOTE
Gets injections in eyes b/l followed by  [facility] in [place]  She had some blurry vision and tearing of eyes recently  She has follow up with the retinal specialist.

## 2022-06-30 NOTE — ASSESSMENT & PLAN NOTE
A1c:   Lab Results   Component Value Date/Time    HBA1C 6.3 (H) 10/18/2021 1217    AVGLUC 134 10/18/2021 1217     Lipids:   Lab Results   Component Value Date/Time    CHOLSTRLTOT 172 10/18/2021 12:17 PM    TRIGLYCERIDE 129 10/18/2021 12:17 PM    HDL 72 10/18/2021 12:17 PM    LDL 74 10/18/2021 12:17 PM   ]  BMP:   Lab Results   Component Value Date/Time    SODIUM 138 02/17/2022 0959    POTASSIUM 4.6 02/17/2022 0959    CHLORIDE 100 02/17/2022 0959    CO2 26 02/17/2022 0959    GLUCOSE 114 (H) 02/17/2022 0959    BUN 24 (H) 02/17/2022 0959    CREATININE 1.26 02/17/2022 0959    CALCIUM 10.0 02/17/2022 0959    ANION 12.0 02/17/2022 0959     GFR:   Lab Results   Component Value Date/Time    IFAFRICA 48 (A) 02/17/2022 0959    IFNOTAFR 40 (A) 02/17/2022 0959     Last eye exam: upto date  Foot exam: no callus or ulcers reported  Medications: asa, crestor, metformin 500mg

## 2022-06-30 NOTE — PROGRESS NOTES
Subjective:   Amada Xavier is a 89 y.o. female here today for evaluation and management of:     Diabetes mellitus type 2 in nonobese  A1c:   Lab Results   Component Value Date/Time    HBA1C 6.3 (H) 10/18/2021 1217    AVGLUC 134 10/18/2021 1217     Lipids:   Lab Results   Component Value Date/Time    CHOLSTRLTOT 172 10/18/2021 12:17 PM    TRIGLYCERIDE 129 10/18/2021 12:17 PM    HDL 72 10/18/2021 12:17 PM    LDL 74 10/18/2021 12:17 PM   ]  BMP:   Lab Results   Component Value Date/Time    SODIUM 138 02/17/2022 0959    POTASSIUM 4.6 02/17/2022 0959    CHLORIDE 100 02/17/2022 0959    CO2 26 02/17/2022 0959    GLUCOSE 114 (H) 02/17/2022 0959    BUN 24 (H) 02/17/2022 0959    CREATININE 1.26 02/17/2022 0959    CALCIUM 10.0 02/17/2022 0959    ANION 12.0 02/17/2022 0959     GFR:   Lab Results   Component Value Date/Time    IFAFRICA 48 (A) 02/17/2022 0959    IFNOTAFR 40 (A) 02/17/2022 0959     Last eye exam: upto date  Foot exam: no callus or ulcers reported  Medications: asa, crestor, metformin 500mg      AMD (age-related macular degeneration), bilateral  Gets injections in eyes b/l followed by  [facility] in [place]  She had some blurry vision and tearing of eyes recently  She has follow up with the retinal specialist.     Acute left ankle pain  Continues with PT for left ankle pain and instability  PT has been helping.          Current medicines (including changes today)  Current Outpatient Medications   Medication Sig Dispense Refill   • clindamycin (CLEOCIN) 150 MG Cap TAKE 2 CAPSULES BY MOUTH THREE TIMES DAILY UNTIL GONE     • Zoster Vac Recomb Adjuvanted (SHINGRIX) 50 MCG/0.5ML Recon Susp Inject 0.5 mL into the shoulder, thigh, or buttocks one time for 1 dose. 0.5 mL 0   • levothyroxine (SYNTHROID) 75 MCG Tab Take 1 Tablet by mouth 1 time a day as needed. 90 Tablet 3   • Aflibercept (EYLEA) 2 MG/0.05ML Solution inject 0.05 milliliter by intravitreal route  every 4 weeks for 5 doses then every 8 weeks      • albuterol 108 (90 Base) MCG/ACT Aero Soln inhalation aerosol Inhale 2 Puffs every day.     • aspirin 81 MG EC tablet Take 1 Tablet by mouth every day.     • azelastine (ASTELIN) 137 MCG/SPRAY nasal spray Administer 1 Spray into affected nostril(S) 2 times a day.     • Calcium Citrate-Vitamin D 200-125 MG-UNIT Tab      • vitamin D (VITAMIND D3) 1000 UNIT Tab Take 1,000 Units by mouth.     • escitalopram (LEXAPRO) 10 MG Tab Take 10 mg by mouth every day.     • fluticasone (FLONASE) 50 MCG/ACT nasal spray Administer 1 Spray into affected nostril(S) every day.     • Probiotic Product (ALIGN) Cap Take 1 Capsule by mouth every day.     • nebivolol (BYSTOLIC) 5 MG Tab tablet      • losartan (COZAAR) 100 MG Tab      • metFORMIN ER (GLUCOPHAGE XR) 500 MG TABLET SR 24 HR      • rosuvastatin (CRESTOR) 10 MG Tab      • diltiazem CD (CARDIZEM CD) 360 MG ER capsule      • metronidazole (METROGEL) 0.75 % gel Apply  topically 2 times a day.     • fenofibrate (TRIGLIDE) 160 MG tablet Take 160 mg by mouth every day.     • Cholecalciferol (D3 HIGH POTENCY) 2000 UNIT Cap Take 2,000 Units by mouth every day.     • Clobetasol Prop-Niacinamide 0.05-4 % Ointment Apply  topically.     • fexofenadine (ALLEGRA) 180 MG tablet Take 180 mg by mouth every day.     • Multiple Vitamins-Minerals (PRESERVISION AREDS 2 PO) Take  by mouth.     • montelukast (SINGULAIR) 10 MG TABS Take 1 Tab by mouth every day. 90 Tab 3     No current facility-administered medications for this visit.     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), Aortic regurgitation (10/4/2012), ASTHMA, Breast cancer ([facility]) (8/8/2012), Bronchitis, Cancer ([facility]), Carotid artery plaque (10/4/2012), Chickenpox, Depression (8/8/2012), Diabetes, Diabetes mellitus type 2 in nonobese ([facility]) (8/8/2012), GERD (gastroesophageal reflux disease), Bulgarian measles, Glaucoma, History of colonoscopy (8/8/2012), History of  "mammogram (8/8/2012), Hyperlipidemia (8/8/2012), Hypertension (8/8/2012), Hypothyroidism (8/8/2012), Influenza, Mumps, Nasal drainage, Obesity, Rosacea, Thyroid disease, and Whooping cough.    ROS  No chest pain, no shortness of breath, no abdominal pain       Objective:     /70   Pulse 63   Temp 36.4 °C (97.5 °F) (Temporal)   Resp 14   Ht 1.6 m (5' 3\")   Wt 76.7 kg (169 lb)   SpO2 93%  Body mass index is 29.94 kg/m².   Physical Exam:  Constitutional: Alert, no distress.  Skin: Warm, dry, good turgor, no rashes in visible areas.  Eye: Equal, round and reactive, conjunctiva clear, lids normal.  ENMT: Lips without lesions, good dentition, oropharynx clear.  Neck: Trachea midline, no masses, no thyromegaly. No cervical or supraclavicular lymphadenopathy  Respiratory: Unlabored respiratory effort, lungs clear to auscultation, no wheezes, no ronchi.  Cardiovascular: Normal S1, S2, no murmur, no edema.  Abdomen: Soft, non-tender, no masses, no hepatosplenomegaly.  Psych: Alert and oriented x3, normal affect and mood.        Assessment and Plan:   The following treatment plan was discussed    1. Diabetes mellitus type 2 in nonobese (HCC)    - POCT A1C  - Comp Metabolic Panel; Future    2. Need for vaccination    - Zoster Vac Recomb Adjuvanted (SHINGRIX) 50 MCG/0.5ML Recon Susp; Inject 0.5 mL into the shoulder, thigh, or buttocks one time for 1 dose.  Dispense: 0.5 mL; Refill: 0    3. Stage 3b chronic kidney disease (HCC)    - URINALYSIS,CULTURE IF INDICATED; Future    4. Anemia, unspecified type    - CBC WITH DIFFERENTIAL; Future    5. AMD (age-related macular degeneration), bilateral      6. Acute left ankle pain        Followup: Return in about 6 months (around 12/30/2022) for AWV.         "

## 2022-07-27 NOTE — RESULT ENCOUNTER NOTE
Released to chiquis Ybarra,  I'm glad to see the breast ultrasound is normal with no sign of cancer!  Remberto Kaufman M.D.

## 2022-07-28 ENCOUNTER — OFFICE VISIT (OUTPATIENT)
Dept: MEDICAL GROUP | Facility: PHYSICIAN GROUP | Age: 87
End: 2022-07-28
Payer: MEDICARE

## 2022-07-28 VITALS
TEMPERATURE: 98.1 F | SYSTOLIC BLOOD PRESSURE: 132 MMHG | DIASTOLIC BLOOD PRESSURE: 74 MMHG | HEIGHT: 63 IN | WEIGHT: 173.2 LBS | RESPIRATION RATE: 12 BRPM | OXYGEN SATURATION: 93 % | BODY MASS INDEX: 30.69 KG/M2 | HEART RATE: 83 BPM

## 2022-07-28 DIAGNOSIS — R06.02 SHORTNESS OF BREATH: ICD-10-CM

## 2022-07-28 DIAGNOSIS — R00.1 BRADYCARDIA: ICD-10-CM

## 2022-07-28 DIAGNOSIS — N18.32 STAGE 3B CHRONIC KIDNEY DISEASE: ICD-10-CM

## 2022-07-28 PROCEDURE — 99214 OFFICE O/P EST MOD 30 MIN: CPT | Performed by: NURSE PRACTITIONER

## 2022-07-28 ASSESSMENT — FIBROSIS 4 INDEX: FIB4 SCORE: 1.71

## 2022-07-28 NOTE — ASSESSMENT & PLAN NOTE
Patient was seen in January; had reduced renal function at that time repeat labs showed GFR has decreased   notes at that time indicated a bladder ultrasound and referral to nephrology if worsening

## 2022-07-28 NOTE — PROGRESS NOTES
"Subjective:     CC:   Chief Complaint   Patient presents with   • Lab Results     Labs f/u concerns       HPI:   Amada presents today for f/up on labs done at [facility]   Pt had left a message about her pulse being in the 40s-50s; she talked to the on call provider who advised her to stop the beta blocker; since she has, the HR has normalized  Has appt w/cardio tomorrow        Stage 3 CKD:  Patient was seen in January; had reduced renal function at that time repeat labs showed GFR has decreased   notes at that time indicated a bladder ultrasound and referral to nephrology if worsening    Sob:   Sent to pulmonology; they are recommending oxygen at night; has several f/ups       ROS per HPI    Objective:     Exam:  /74 (BP Location: Right arm, Patient Position: Sitting, BP Cuff Size: Adult)   Pulse 83   Temp 36.7 °C (98.1 °F) (Temporal)   Resp 12   Ht 1.6 m (5' 3\")   Wt 78.6 kg (173 lb 3.2 oz)   SpO2 93%   BMI 30.68 kg/m²  Body mass index is 30.68 kg/m².    Physical Exam:  Constitutional: Well-developed and well-nourished female  Not diaphoretic. No distress.   Skin: warm, dry, intact, no evidence of rash or concerning lesions  Head: Atraumatic without lesions.  Eyes: Conjunctivae are normal. Pupils are equal, round. No scleral icterus.   Ears:  External ears unremarkable.   Neck: Supple, trachea midline.   Cardiovascular: Regular rate and rhythm without murmur.   Pulmonary: Clear to ausculation. Normal effort. No rales, ronchi, or wheezing.  Extremities: No cyanosis, clubbing, erythema, nor edema.   Neurological: Alert and oriented x 3.   Psychiatric:  Behavior, mood, and affect are appropriate.        Assessment & Plan:     89 y.o. female with the following -     1. Stage 3b chronic kidney disease (HCC)  Renal u/s and nephrology referral ordered     2. Shortness of breath  F/up w/pulm     3. Bradycardia   f/up w/cardio tomorrow     Patient had several other items she wanted to discuss in the office   I " explained to her that we covered as much as we could and she needs to make 1-2 follow-ups with her primary care provider    Spent 30 min reviewing labs, notes, ekg from patient and discussing plan of care    Return in about 4 weeks (around 8/25/2022) for gen check in.    Please note that this dictation was created using voice recognition software. I have made every reasonable attempt to correct obvious errors, but I expect that there are errors of grammar and possibly content that I did not discover before finalizing the note.

## 2022-07-28 NOTE — ASSESSMENT & PLAN NOTE
Pulse was dropping to low of 38 multiple occasions recently pt called provider on call who advise d/c vanna  Has f/up w/cardio tomorrow

## 2022-08-10 ENCOUNTER — NON-PROVIDER VISIT (OUTPATIENT)
Dept: SLEEP MEDICINE | Facility: MEDICAL CENTER | Age: 87
End: 2022-08-10
Attending: INTERNAL MEDICINE
Payer: MEDICARE

## 2022-08-10 ENCOUNTER — OFFICE VISIT (OUTPATIENT)
Dept: SLEEP MEDICINE | Facility: MEDICAL CENTER | Age: 87
End: 2022-08-10
Payer: MEDICARE

## 2022-08-10 VITALS — BODY MASS INDEX: 30.48 KG/M2 | WEIGHT: 172 LBS | HEIGHT: 63 IN

## 2022-08-10 VITALS
HEIGHT: 63 IN | SYSTOLIC BLOOD PRESSURE: 130 MMHG | RESPIRATION RATE: 16 BRPM | OXYGEN SATURATION: 92 % | WEIGHT: 172 LBS | BODY MASS INDEX: 30.48 KG/M2 | HEART RATE: 96 BPM | DIASTOLIC BLOOD PRESSURE: 60 MMHG

## 2022-08-10 DIAGNOSIS — J45.20 MILD INTERMITTENT REACTIVE AIRWAY DISEASE WITHOUT COMPLICATION: ICD-10-CM

## 2022-08-10 DIAGNOSIS — J98.4 RESTRICTIVE AIRWAY DISEASE: ICD-10-CM

## 2022-08-10 DIAGNOSIS — R06.02 SOB (SHORTNESS OF BREATH): ICD-10-CM

## 2022-08-10 PROCEDURE — 94060 EVALUATION OF WHEEZING: CPT | Performed by: INTERNAL MEDICINE

## 2022-08-10 PROCEDURE — 99214 OFFICE O/P EST MOD 30 MIN: CPT | Mod: 25 | Performed by: INTERNAL MEDICINE

## 2022-08-10 ASSESSMENT — ENCOUNTER SYMPTOMS
HEMOPTYSIS: 0
BACK PAIN: 0
PHOTOPHOBIA: 0
WEAKNESS: 0
PALPITATIONS: 0
EYE REDNESS: 0
BLURRED VISION: 0
EYE PAIN: 0
HEADACHES: 0
WHEEZING: 0
COUGH: 0
ORTHOPNEA: 0
TREMORS: 0
WEIGHT LOSS: 0
MYALGIAS: 0
DIARRHEA: 0
SINUS PAIN: 0
DOUBLE VISION: 0
CHILLS: 0
DEPRESSION: 0
SPEECH CHANGE: 0
NAUSEA: 0
EYE DISCHARGE: 0
FALLS: 0
CONSTIPATION: 0
STRIDOR: 0
PND: 0
HEARTBURN: 0
CLAUDICATION: 0
FEVER: 0
DIZZINESS: 0
SORE THROAT: 0
FOCAL WEAKNESS: 0
NECK PAIN: 0
ABDOMINAL PAIN: 0
VOMITING: 0
DIAPHORESIS: 0
SHORTNESS OF BREATH: 0
SPUTUM PRODUCTION: 0

## 2022-08-10 ASSESSMENT — PULMONARY FUNCTION TESTS
FEV1_PERCENT_CHANGE: 1
FEV1_PERCENT_CHANGE: -8
FEV1_PREDICTED: 1.61
FVC_PERCENT_PREDICTED: 77
FEV1_PREDICTED: 92
FEV1/FVC: 90.42
FVC_PERCENT_PREDICTED: 84
FEV1/FVC_PERCENT_CHANGE: -13
FVC_PREDICTED: 2.16
FVC: 1.67
FEV1: 1.51
FEV1_PERCENT_PREDICTED: 93
FVC: 1.82
FEV1: 1.49
FEV1/FVC_PERCENT_PREDICTED: 121
FEV1/FVC: 82
FEV1/FVC_PERCENT_PREDICTED: 110
FEV1/FVC_PREDICTED: 74.54

## 2022-08-10 ASSESSMENT — FIBROSIS 4 INDEX
FIB4 SCORE: 1.71
FIB4 SCORE: 1.71

## 2022-08-10 NOTE — PROGRESS NOTES
Chief Complaint   Patient presents with    Follow-Up     Last seen 5/9/22 Dr. Eli     Results     Eric 8/10/22          HPI: This patient is a 89 y.o. female whom is followed in our clinic for reactive airway disease but also restriction on PFTs last seen by Dr. Eli on 5/9/22. I saw the pt last in February when she was doing well from a pulmonary standpoint. PMHx is significant for breast cancer status post left mastectomy in 1985, dyslipidemia, hypertension, diabetes, hypothyroid and asthma on only prn LAWANDA as she has not tolerated ICS in the past.  She is a lifelong non-smoker. She is followed by cardiology at [facility]. She was hospitalized in October of last year at [place] for GI illness during which CTA was obtained, did not show PE but did show ? Interstitial infiltrates vs. Pneumonitis I suspect due to possible aspiration vs. Volume overload after resuscitation. O2 was low during her stay but had improved by the time I saw her in February. She developed  worsening SOB about two weeks after our visit and was seen by Dr. Eli, noted to be hypoxic. Supplemental O2 was ordered but never obtained. By the time she saw Dr. Eli in May, her sxs had improved. She did stop bystolic in the meantime and she was noting HR as low as 40. Per pt since stopping this medication she feels her breathing is back to baseline. She does have cardiac monitor on today. Her spirometry today is essentially normal with FEV/FVC of 82, FEV1 of 92% pred, FVC of 84% pred, no BD response. TTE done yesterday was notable only for grade I diastolic dysfx.    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    Aortic regurgitation 10/4/2012    ASTHMA     Breast cancer (HCC) 8/8/2012    Bronchitis     Cancer ([facility])     Carotid artery plaque 10/4/2012    Chickenpox     Depression 8/8/2012    Diabetes     Diabetes mellitus type 2 in nonobese ([facility])  2012    GERD (gastroesophageal reflux disease)     [place] measles     Glaucoma     History of colonoscopy 2012    History of mammogram 2012    Hyperlipidemia 2012    Hypertension 2012    Hypothyroidism 2012    Influenza     Mumps     Nasal drainage     Obesity     Rosacea     Thyroid disease     Whooping cough        Social History     Socioeconomic History    Marital status: Single     Spouse name: Not on file    Number of children: Not on file    Years of education: Not on file    Highest education level: Not on file   Occupational History    Not on file   Tobacco Use    Smoking status: Never    Smokeless tobacco: Never   Vaping Use    Vaping Use: Never used   Substance and Sexual Activity    Alcohol use: No    Drug use: No    Sexual activity: Not Currently   Other Topics Concern    Not on file   Social History Narrative    Not on file     Social Determinants of Health     Financial Resource Strain: Low Risk     Difficulty of Paying Living Expenses: Not hard at all   Food Insecurity: No Food Insecurity    Worried About Running Out of Food in the Last Year: Never true    Ran Out of Food in the Last Year: Never true   Transportation Needs: No Transportation Needs    Lack of Transportation (Medical): No    Lack of Transportation (Non-Medical): No   Physical Activity: Not on file   Stress: Not on file   Social Connections: Not on file   Intimate Partner Violence: Not on file   Housing Stability: Unknown    Unable to Pay for Housing in the Last Year: No    Number of Places Lived in the Last Year: Not on file    Unstable Housing in the Last Year: No       Family History   Problem Relation Age of Onset    Heart Disease Mother          age 88, CAD    GI Disease Father         cirrhosis, CHF    Heart Disease Father     Heart Failure Father     Other Father        Current Outpatient Medications on File Prior to Visit   Medication Sig Dispense Refill    Aflibercept (EYLEA) 2 MG/0.05ML Solution  inject 0.05 milliliter by intravitreal route  every 4 weeks for 5 doses then every 8 weeks      clindamycin (CLEOCIN) 150 MG Cap TAKE 2 CAPSULES BY MOUTH THREE TIMES DAILY UNTIL GONE      levothyroxine (SYNTHROID) 75 MCG Tab Take 1 Tablet by mouth 1 time a day as needed. 90 Tablet 3    albuterol 108 (90 Base) MCG/ACT Aero Soln inhalation aerosol Inhale 2 Puffs every day.      aspirin 81 MG EC tablet Take 1 Tablet by mouth every day.      azelastine (ASTELIN) 137 MCG/SPRAY nasal spray Administer 1 Spray into affected nostril(S) 2 times a day.      Calcium Citrate-Vitamin D 200-125 MG-UNIT Tab       vitamin D (VITAMIND D3) 1000 UNIT Tab Take 1,000 Units by mouth.      escitalopram (LEXAPRO) 10 MG Tab Take 10 mg by mouth every day.      fluticasone (FLONASE) 50 MCG/ACT nasal spray Administer 1 Spray into affected nostril(S) every day.      Probiotic Product (ALIGN) Cap Take 1 Capsule by mouth every day.      losartan (COZAAR) 100 MG Tab       metFORMIN ER (GLUCOPHAGE XR) 500 MG TABLET SR 24 HR       rosuvastatin (CRESTOR) 10 MG Tab       diltiazem CD (CARDIZEM CD) 360 MG ER capsule       metronidazole (METROGEL) 0.75 % gel Apply  topically 2 times a day.      fenofibrate (TRIGLIDE) 160 MG tablet Take 160 mg by mouth every day.      Cholecalciferol (D3 HIGH POTENCY) 2000 UNIT Cap Take 2,000 Units by mouth every day.      Clobetasol Prop-Niacinamide 0.05-4 % Ointment Apply  topically.      fexofenadine (ALLEGRA) 180 MG tablet Take 180 mg by mouth every day.      Multiple Vitamins-Minerals (PRESERVISION AREDS 2 PO) Take  by mouth.      montelukast (SINGULAIR) 10 MG TABS Take 1 Tab by mouth every day. 90 Tab 3     No current facility-administered medications on file prior to visit.       Atorvastatin, Bacitracin, Clarithromycin, Linaclotide, Pcn [penicillins], and Sulfa drugs      ROS:   Review of Systems   Constitutional:  Negative for chills, diaphoresis, fever, malaise/fatigue and weight loss.   HENT:  Negative for  "congestion, ear discharge, ear pain, hearing loss, nosebleeds, sinus pain, sore throat and tinnitus.    Eyes:  Negative for blurred vision, double vision, photophobia, pain, discharge and redness.   Respiratory:  Negative for cough, hemoptysis, sputum production, shortness of breath, wheezing and stridor.    Cardiovascular:  Negative for chest pain, palpitations, orthopnea, claudication, leg swelling and PND.   Gastrointestinal:  Negative for abdominal pain, constipation, diarrhea, heartburn, nausea and vomiting.   Genitourinary:  Negative for dysuria and urgency.   Musculoskeletal:  Positive for joint pain. Negative for back pain, falls, myalgias and neck pain.   Skin:  Negative for itching and rash.   Neurological:  Negative for dizziness, tremors, speech change, focal weakness, weakness and headaches.   Endo/Heme/Allergies:  Negative for environmental allergies.   Psychiatric/Behavioral:  Negative for depression.      /60 (BP Location: Right arm, Patient Position: Sitting, BP Cuff Size: Small adult)   Pulse 96   Resp 16   Ht 1.6 m (5' 3\")   Wt 78 kg (172 lb)   SpO2 92%   Physical Exam  Constitutional:       General: She is not in acute distress.     Appearance: Normal appearance. She is well-developed and normal weight.   HENT:      Head: Normocephalic and atraumatic.      Right Ear: External ear normal.      Left Ear: External ear normal.      Nose: Nose normal. No congestion.      Mouth/Throat:      Mouth: Mucous membranes are moist.      Pharynx: Oropharynx is clear. No oropharyngeal exudate.   Eyes:      General: No scleral icterus.     Extraocular Movements: Extraocular movements intact.      Conjunctiva/sclera: Conjunctivae normal.      Pupils: Pupils are equal, round, and reactive to light.   Neck:      Vascular: No JVD.      Trachea: No tracheal deviation.   Cardiovascular:      Rate and Rhythm: Normal rate and regular rhythm.      Heart sounds: Normal heart sounds. No murmur heard.    No " friction rub. No gallop.   Pulmonary:      Effort: Pulmonary effort is normal. No accessory muscle usage or respiratory distress.      Breath sounds: Normal breath sounds. No wheezing or rales.   Abdominal:      General: There is no distension.      Palpations: Abdomen is soft.      Tenderness: There is no abdominal tenderness.   Musculoskeletal:         General: No tenderness or deformity. Normal range of motion.      Cervical back: Normal range of motion and neck supple.      Right lower leg: No edema.      Left lower leg: No edema.   Lymphadenopathy:      Cervical: No cervical adenopathy.   Skin:     General: Skin is warm and dry.      Findings: No rash.      Nails: There is no clubbing.   Neurological:      Mental Status: She is alert and oriented to person, place, and time.      Cranial Nerves: No cranial nerve deficit.      Gait: Gait normal.       PFTs as reviewed by me personally: as per HPI    Imagaing as reviewed by me personally:  as per HPI    Assessment:  1. SOB (shortness of breath)        2. Restrictive airway disease        3. Mild intermittent reactive airway disease without complication            Plan:  This was acute and has resolved with stopping bystolic. ? Related to bradycardia. Spirometry, exam and oxygenation are wnls today. Overall sxs have resolved. Continue supportive care and f/u cardiology for event monitor  Stable. No e/o progression and normal exam.  Stable. Continue prn bobby; up to date on vaccines  Return in about 4 months (around 12/10/2022) for RESTRICTIVE LUNG DISEAES, REACTIVE AIRWAY DISEASE.

## 2022-08-10 NOTE — PROCEDURES
Interpretation;  Baseline spirometry shows normal airflows.  No significant bronchodilator response.  Normal baseline spirometry with normal flow volume loop.

## 2022-08-25 ENCOUNTER — OFFICE VISIT (OUTPATIENT)
Dept: MEDICAL GROUP | Facility: PHYSICIAN GROUP | Age: 87
End: 2022-08-25
Payer: MEDICARE

## 2022-08-25 VITALS
TEMPERATURE: 98.2 F | OXYGEN SATURATION: 92 % | DIASTOLIC BLOOD PRESSURE: 60 MMHG | HEIGHT: 63 IN | WEIGHT: 173 LBS | BODY MASS INDEX: 30.65 KG/M2 | SYSTOLIC BLOOD PRESSURE: 138 MMHG | HEART RATE: 79 BPM | RESPIRATION RATE: 14 BRPM

## 2022-08-25 DIAGNOSIS — H40.9 GLAUCOMA OF BOTH EYES, UNSPECIFIED GLAUCOMA TYPE: ICD-10-CM

## 2022-08-25 DIAGNOSIS — E11.9 DIABETES MELLITUS TYPE 2 IN NONOBESE (HCC): ICD-10-CM

## 2022-08-25 DIAGNOSIS — R00.1 BRADYCARDIA: ICD-10-CM

## 2022-08-25 DIAGNOSIS — R21 RASH OF BACK: ICD-10-CM

## 2022-08-25 PROBLEM — M25.572 ACUTE LEFT ANKLE PAIN: Status: RESOLVED | Noted: 2021-09-22 | Resolved: 2022-08-25

## 2022-08-25 PROBLEM — S52.531A FRACTURE, COLLES, RIGHT, CLOSED: Status: RESOLVED | Noted: 2021-10-13 | Resolved: 2022-08-25

## 2022-08-25 PROCEDURE — 99214 OFFICE O/P EST MOD 30 MIN: CPT | Performed by: FAMILY MEDICINE

## 2022-08-25 RX ORDER — LOSARTAN POTASSIUM 100 MG/1
1 TABLET ORAL DAILY
COMMUNITY
Start: 2022-05-24

## 2022-08-25 RX ORDER — FENOFIBRATE 160 MG/1
1 TABLET ORAL DAILY
COMMUNITY
Start: 2022-08-22

## 2022-08-25 RX ORDER — METRONIDAZOLE 10 MG/60G
CREAM TOPICAL
COMMUNITY

## 2022-08-25 RX ORDER — DILTIAZEM HYDROCHLORIDE 360 MG/1
1 CAPSULE, EXTENDED RELEASE ORAL DAILY
COMMUNITY
Start: 2022-08-22 | End: 2023-01-10

## 2022-08-25 RX ORDER — FLUOCINOLONE ACETONIDE 0.25 MG/G
CREAM TOPICAL
Qty: 60 G | Refills: 3 | Status: SHIPPED | OUTPATIENT
Start: 2022-08-25

## 2022-08-25 RX ORDER — KETOCONAZOLE 20 MG/G
CREAM TOPICAL
Qty: 60 G | Refills: 3 | Status: SHIPPED | OUTPATIENT
Start: 2022-08-25

## 2022-08-25 ASSESSMENT — FIBROSIS 4 INDEX: FIB4 SCORE: 1.71

## 2022-08-25 NOTE — ASSESSMENT & PLAN NOTE
Patient followed closely by ophthalmology for glaucoma and wet macular degeneration, gets eye injections.

## 2022-08-25 NOTE — LETTER
[employer] [facility]  Remberto Kaufman M.D.  [address] [place] [address]  Fax: [phone]   Authorization for Release/Disclosure of   Protected Health Information   Name: AMADA ANTUNEZ : 1930 SSN: [id number]   Address: [address] [place] [id number] Phone:    [phone] (home)    I authorize the entity listed below to release/disclose the PHI below to:   [facility]/Remberto Kaufman M.D. and Remberto Kaufman M.D.   Provider or Entity Name:  Dr العلي (Cardiologist , [facility]   Phone:      Fax:     Reason for request: continuity of care   Information to be released:    [  ] LAST COLONOSCOPY,  including any PATH REPORT and follow-up  [  ] LAST FIT/COLOGUARD RESULT [  ] LAST DEXA  [  ] LAST MAMMOGRAM  [  ] LAST PAP  [  ] LAST LABS [  ] RETINA EXAM REPORT  [  ] IMMUNIZATION RECORDS  [  ] Release all info      [  ] Check here and initial the line next to each item to release ALL health information INCLUDING  _____ Care and treatment for drug and / or alcohol abuse  _____ HIV testing, infection status, or AIDS  _____ Genetic Testing    DATES OF SERVICE OR TIME PERIOD TO BE DISCLOSED: _____________  I understand and acknowledge that:  * This Authorization may be revoked at any time by you in writing, except if your health information has already been used or disclosed.  * Your health information that will be used or disclosed as a result of you signing this authorization could be re-disclosed by the recipient. If this occurs, your re-disclosed health information may no longer be protected by State or Federal laws.  * You may refuse to sign this Authorization. Your refusal will not affect your ability to obtain treatment.  * This Authorization becomes effective upon signing and will  on (date) __________.      If no date is indicated, this Authorization will  one (1) year from the signature date.    Name: Amada Mejia  Morenita    Signature:   Date:     8/25/2022       PLEASE FAX REQUESTED RECORDS BACK TO: [phone]

## 2022-08-25 NOTE — ASSESSMENT & PLAN NOTE
In cleft of buttocks dry rash, controlled with mupirocen, ketoconazole and fluocinolone.   Refills provided

## 2022-08-25 NOTE — ASSESSMENT & PLAN NOTE
Resolved after stopped nabetalol  Followed by cardiology  Had a holter and echo done. Records requested.

## 2022-08-25 NOTE — ASSESSMENT & PLAN NOTE
A1c:   Lab Results   Component Value Date/Time    HBA1C 6.2 (A) 06/30/2022 1130    AVGLUC 134 10/18/2021 1217     Lipids:   Lab Results   Component Value Date/Time    CHOLSTRLTOT 172 10/18/2021 12:17 PM    TRIGLYCERIDE 129 10/18/2021 12:17 PM    HDL 72 10/18/2021 12:17 PM    LDL 74 10/18/2021 12:17 PM   ]  BMP:   Lab Results   Component Value Date/Time    SODIUM 138 02/17/2022 0959    POTASSIUM 4.6 02/17/2022 0959    CHLORIDE 100 02/17/2022 0959    CO2 26 02/17/2022 0959    GLUCOSE 114 (H) 02/17/2022 0959    BUN 24 (H) 02/17/2022 0959    CREATININE 1.26 02/17/2022 0959    CALCIUM 10.0 02/17/2022 0959    ANION 12.0 02/17/2022 0959     GFR:   Lab Results   Component Value Date/Time    IFAFRICA 48 (A) 02/17/2022 0959    IFNOTAFR 40 (A) 02/17/2022 0959     Last eye exam: up to date  Foot exam: no ulcers or callus  Medications: metformin, statin, losartan, asa

## 2022-08-26 ENCOUNTER — HOSPITAL ENCOUNTER (OUTPATIENT)
Dept: RADIOLOGY | Facility: MEDICAL CENTER | Age: 87
End: 2022-08-26
Attending: NURSE PRACTITIONER
Payer: MEDICARE

## 2022-08-26 DIAGNOSIS — N18.32 STAGE 3B CHRONIC KIDNEY DISEASE: ICD-10-CM

## 2022-08-26 PROCEDURE — 76775 US EXAM ABDO BACK WALL LIM: CPT

## 2022-08-26 NOTE — PROGRESS NOTES
Subjective:   Amada Xavier is a 89 y.o. female here today for evaluation and management of:     Diabetes mellitus type 2 in nonobese  A1c:   Lab Results   Component Value Date/Time    HBA1C 6.2 (A) 06/30/2022 1130    AVGLUC 134 10/18/2021 1217     Lipids:   Lab Results   Component Value Date/Time    CHOLSTRLTOT 172 10/18/2021 12:17 PM    TRIGLYCERIDE 129 10/18/2021 12:17 PM    HDL 72 10/18/2021 12:17 PM    LDL 74 10/18/2021 12:17 PM   ]  BMP:   Lab Results   Component Value Date/Time    SODIUM 138 02/17/2022 0959    POTASSIUM 4.6 02/17/2022 0959    CHLORIDE 100 02/17/2022 0959    CO2 26 02/17/2022 0959    GLUCOSE 114 (H) 02/17/2022 0959    BUN 24 (H) 02/17/2022 0959    CREATININE 1.26 02/17/2022 0959    CALCIUM 10.0 02/17/2022 0959    ANION 12.0 02/17/2022 0959     GFR:   Lab Results   Component Value Date/Time    IFAFRICA 48 (A) 02/17/2022 0959    IFNOTAFR 40 (A) 02/17/2022 0959     Last eye exam: up to date  Foot exam: no ulcers or callus  Medications: metformin, statin, losartan, asa      Glaucoma  Patient followed closely by ophthalmology for glaucoma and wet macular degeneration, gets eye injections.     Bradycardia  Resolved after stopped nabetalol  Followed by cardiology  Had a holter and echo done. Records requested.     Rash of back  In cleft of buttocks dry rash, controlled with mupirocen, ketoconazole and fluocinolone.   Refills provided       Current medicines (including changes today)  Current Outpatient Medications   Medication Sig Dispense Refill    metronidazole (NORITATE) 1 % cream apply by topical route  every day a thin layer to the affected area(s)      losartan (COZAAR) 100 MG Tab Take 1 Tablet by mouth every day.      fenofibrate (TRIGLIDE) 160 MG tablet Take 1 Tablet by mouth every day.      DILTIAZem CD (CARDIZEM CD) 360 MG CAPSULE SR 24 HR Take 1 Capsule by mouth every day.      mupirocin (BACTROBAN) 2 % Ointment Apply 1 Application topically 2 times a day. 22 g 0    fluocinolone  (SYNALAR) 0.025 % cream Apply 1 gram topically once a day to affected skin 60 g 3    ketoconazole (NIZORAL) 2 % Cream Use one gram on affected skin once a day 60 g 3    levothyroxine (SYNTHROID) 75 MCG Tab Take 1 Tablet by mouth 1 time a day as needed. 90 Tablet 3    Aflibercept (EYLEA) 2 MG/0.05ML Solution inject 0.05 milliliter by intravitreal route  every 4 weeks for 5 doses then every 8 weeks      albuterol 108 (90 Base) MCG/ACT Aero Soln inhalation aerosol Inhale 2 Puffs every day.      aspirin 81 MG EC tablet Take 1 Tablet by mouth every day.      azelastine (ASTELIN) 137 MCG/SPRAY nasal spray Administer 1 Spray into affected nostril(S) 2 times a day.      Calcium Citrate-Vitamin D 200-125 MG-UNIT Tab       vitamin D (VITAMIND D3) 1000 UNIT Tab Take 1,000 Units by mouth.      escitalopram (LEXAPRO) 10 MG Tab Take 10 mg by mouth every day.      fluticasone (FLONASE) 50 MCG/ACT nasal spray Administer 1 Spray into affected nostril(S) every day.      Probiotic Product (ALIGN) Cap Take 1 Capsule by mouth every day.      losartan (COZAAR) 100 MG Tab       metFORMIN ER (GLUCOPHAGE XR) 500 MG TABLET SR 24 HR       rosuvastatin (CRESTOR) 10 MG Tab       diltiazem CD (CARDIZEM CD) 360 MG ER capsule       metronidazole (METROGEL) 0.75 % gel Apply  topically 2 times a day.      fenofibrate (TRIGLIDE) 160 MG tablet Take 160 mg by mouth every day.      Clobetasol Prop-Niacinamide 0.05-4 % Ointment Apply  topically.      fexofenadine (ALLEGRA) 180 MG tablet Take 180 mg by mouth every day.      Multiple Vitamins-Minerals (PRESERVISION AREDS 2 PO) Take  by mouth.      montelukast (SINGULAIR) 10 MG TABS Take 1 Tab by mouth every day. 90 Tab 3    clindamycin (CLEOCIN) 150 MG Cap TAKE 2 CAPSULES BY MOUTH THREE TIMES DAILY UNTIL GONE      Cholecalciferol (D3 HIGH POTENCY) 2000 UNIT Cap Take 2,000 Units by mouth every day.       No current facility-administered medications for this visit.     She  has a past medical history of 6th  "nerve palsy, Allergic rhinitis, Anomalous origin of coronary artery (8/8/2012), Aortic regurgitation (10/4/2012), ASTHMA, Breast cancer ([facility]) (8/8/2012), Bronchitis, Cancer ([facility]), Carotid artery plaque (10/4/2012), Chickenpox, Depression (8/8/2012), Diabetes, Diabetes mellitus type 2 in nonobese ([facility]) (8/8/2012), GERD (gastroesophageal reflux disease), [place] measles, Glaucoma, History of colonoscopy (8/8/2012), History of mammogram (8/8/2012), Hyperlipidemia (8/8/2012), Hypertension (8/8/2012), Hypothyroidism (8/8/2012), Influenza, Mumps, Nasal drainage, Obesity, Rosacea, Thyroid disease, and Whooping cough.    ROS  No chest pain, no shortness of breath, no abdominal pain       Objective:     /60 (BP Location: Left arm, Patient Position: Sitting, BP Cuff Size: Adult)   Pulse 79   Temp 36.8 °C (98.2 °F) (Temporal)   Resp 14   Ht 1.6 m (5' 3\")   Wt 78.5 kg (173 lb)   SpO2 92%  Body mass index is 30.65 kg/m².   Physical Exam:  Constitutional: Alert, no distress.  Skin: Warm, dry, good turgor, no rashes in visible areas.  Eye: Equal, round and reactive, conjunctiva clear, lids normal.  ENMT: Lips without lesions, good dentition, oropharynx clear.  Neck: Trachea midline, no masses, no thyromegaly. No cervical or supraclavicular lymphadenopathy  Respiratory: Unlabored respiratory effort, lungs clear to auscultation, no wheezes, no ronchi.  Cardiovascular: Normal S1, S2, no murmur, no edema.  Abdomen: Soft, non-tender, no masses, no hepatosplenomegaly.  Psych: Alert and oriented x3, normal affect and mood.        Assessment and Plan:   The following treatment plan was discussed    1. Diabetes mellitus type 2 in nonobese ([facility])    2. Glaucoma of both eyes, unspecified glaucoma type    3. Bradycardia    4. Rash of back    Other orders  - metronidazole (NORITATE) 1 % cream; apply by topical route  every day a thin layer to the affected area(s)  - losartan (COZAAR) 100 MG Tab; Take 1 Tablet by mouth every day.  - " fenofibrate (TRIGLIDE) 160 MG tablet; Take 1 Tablet by mouth every day.  - DILTIAZem CD (CARDIZEM CD) 360 MG CAPSULE SR 24 HR; Take 1 Capsule by mouth every day.  - Obtain Results: Other (see comment) (Cardiology consult notes, echocardiogram); Obtain Results From: [facility]  - mupirocin (BACTROBAN) 2 % Ointment; Apply 1 Application topically 2 times a day.  Dispense: 22 g; Refill: 0  - fluocinolone (SYNALAR) 0.025 % cream; Apply 1 gram topically once a day to affected skin  Dispense: 60 g; Refill: 3  - ketoconazole (NIZORAL) 2 % Cream; Use one gram on affected skin once a day  Dispense: 60 g; Refill: 3      Followup: No follow-ups on file.

## 2022-08-30 ENCOUNTER — OFFICE VISIT (OUTPATIENT)
Dept: MEDICAL GROUP | Facility: PHYSICIAN GROUP | Age: 87
End: 2022-08-30
Payer: MEDICARE

## 2022-08-30 VITALS
BODY MASS INDEX: 30.48 KG/M2 | SYSTOLIC BLOOD PRESSURE: 136 MMHG | WEIGHT: 172 LBS | TEMPERATURE: 98.7 F | OXYGEN SATURATION: 97 % | RESPIRATION RATE: 14 BRPM | HEIGHT: 63 IN | DIASTOLIC BLOOD PRESSURE: 60 MMHG | HEART RATE: 78 BPM

## 2022-08-30 DIAGNOSIS — H35.30 AMD (AGE-RELATED MACULAR DEGENERATION), BILATERAL: ICD-10-CM

## 2022-08-30 DIAGNOSIS — E11.9 DIABETES MELLITUS TYPE 2 IN NONOBESE (HCC): ICD-10-CM

## 2022-08-30 DIAGNOSIS — H40.9 GLAUCOMA OF BOTH EYES, UNSPECIFIED GLAUCOMA TYPE: ICD-10-CM

## 2022-08-30 DIAGNOSIS — N18.32 STAGE 3B CHRONIC KIDNEY DISEASE: ICD-10-CM

## 2022-08-30 DIAGNOSIS — R06.02 SHORTNESS OF BREATH: ICD-10-CM

## 2022-08-30 PROBLEM — Z09 HOSPITAL DISCHARGE FOLLOW-UP: Status: RESOLVED | Noted: 2021-11-18 | Resolved: 2022-08-30

## 2022-08-30 PROCEDURE — 99214 OFFICE O/P EST MOD 30 MIN: CPT | Performed by: FAMILY MEDICINE

## 2022-08-30 ASSESSMENT — FIBROSIS 4 INDEX: FIB4 SCORE: 1.71

## 2022-08-30 NOTE — PATIENT INSTRUCTIONS
Please get fasting labs this week fasting for 8-10 hours before next visit. You can make an appointment for the lab or walk in.   Lab hours [facility] location: Monday to Friday 6 am - 4 pm, Saturday 7 am -noon  Even if you lose your lab paperwork, you can still come in to get your lab done.      Please check [employer] for your referral information or call the referral department at .   You can also send me a [employer] message regarding your referral information.   Please note you will probably not get a call regarding the referral.

## 2022-08-30 NOTE — PROGRESS NOTES
Subjective:   Amada Xavier is a 89 y.o. female here today for evaluation and management of:     Stage 3b chronic kidney disease (HCC)  Last GFR 40 in FEb 2022  Repeat labs were done yesterday at [facility]  I will see follow up labs in 3 months sooner if worsening labs.     Glaucoma  She has narrow angle glaucoma she had iridotomy in the past.   She is followed by ophthalmology and has eye injections done periodically in left eye.   2-3 wks ago vision was blurry.   She now has to use a magnifying glass for reading, looking at prices at the store, elevator buttons.     Shortness of breath  Uses a rescue inhaler at night befor       Current medicines (including changes today)  Current Outpatient Medications   Medication Sig Dispense Refill    metronidazole (NORITATE) 1 % cream apply by topical route  every day a thin layer to the affected area(s)      losartan (COZAAR) 100 MG Tab Take 1 Tablet by mouth every day.      fenofibrate (TRIGLIDE) 160 MG tablet Take 1 Tablet by mouth every day.      DILTIAZem CD (CARDIZEM CD) 360 MG CAPSULE SR 24 HR Take 1 Capsule by mouth every day.      mupirocin (BACTROBAN) 2 % Ointment Apply 1 Application topically 2 times a day. 22 g 0    fluocinolone (SYNALAR) 0.025 % cream Apply 1 gram topically once a day to affected skin 60 g 3    ketoconazole (NIZORAL) 2 % Cream Use one gram on affected skin once a day 60 g 3    levothyroxine (SYNTHROID) 75 MCG Tab Take 1 Tablet by mouth 1 time a day as needed. 90 Tablet 3    Aflibercept (EYLEA) 2 MG/0.05ML Solution inject 0.05 milliliter by intravitreal route  every 4 weeks for 5 doses then every 8 weeks      albuterol 108 (90 Base) MCG/ACT Aero Soln inhalation aerosol Inhale 2 Puffs every day.      aspirin 81 MG EC tablet Take 1 Tablet by mouth every day.      azelastine (ASTELIN) 137 MCG/SPRAY nasal spray Administer 1 Spray into affected nostril(S) 2 times a day.      Calcium Citrate-Vitamin D 200-125 MG-UNIT Tab       vitamin D (VITAMIND  "D3) 1000 UNIT Tab Take 1,000 Units by mouth.      escitalopram (LEXAPRO) 10 MG Tab Take 10 mg by mouth every day.      fluticasone (FLONASE) 50 MCG/ACT nasal spray Administer 1 Spray into affected nostril(S) every day.      Probiotic Product (ALIGN) Cap Take 1 Capsule by mouth every day.      losartan (COZAAR) 100 MG Tab       metFORMIN ER (GLUCOPHAGE XR) 500 MG TABLET SR 24 HR       rosuvastatin (CRESTOR) 10 MG Tab       diltiazem CD (CARDIZEM CD) 360 MG ER capsule       metronidazole (METROGEL) 0.75 % gel Apply  topically 2 times a day.      fenofibrate (TRIGLIDE) 160 MG tablet Take 160 mg by mouth every day.      Clobetasol Prop-Niacinamide 0.05-4 % Ointment Apply  topically.      fexofenadine (ALLEGRA) 180 MG tablet Take 180 mg by mouth every day.      Multiple Vitamins-Minerals (PRESERVISION AREDS 2 PO) Take  by mouth.      montelukast (SINGULAIR) 10 MG TABS Take 1 Tab by mouth every day. 90 Tab 3     No current facility-administered medications for this visit.     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), Aortic regurgitation (10/4/2012), ASTHMA, Breast cancer ([facility]) (8/8/2012), Bronchitis, Cancer ([facility]), Carotid artery plaque (10/4/2012), Chickenpox, Depression (8/8/2012), Diabetes, Diabetes mellitus type 2 in nonobese ([facility]) (8/8/2012), GERD (gastroesophageal reflux disease), Marshallese measles, Glaucoma, History of colonoscopy (8/8/2012), History of mammogram (8/8/2012), Hyperlipidemia (8/8/2012), Hypertension (8/8/2012), Hypothyroidism (8/8/2012), Influenza, Mumps, Nasal drainage, Obesity, Rosacea, Thyroid disease, and Whooping cough.    ROS  No chest pain, no shortness of breath, no abdominal pain       Objective:     /60 (BP Location: Left arm, Patient Position: Sitting, BP Cuff Size: Small adult)   Pulse 78   Temp 37.1 °C (98.7 °F) (Temporal)   Resp 14   Ht 1.6 m (5' 3\")   Wt 78 kg (172 lb)   SpO2 97%  Body mass index is " 30.47 kg/m².   Physical Exam:  Constitutional: Alert, no distress, well-groomed.  Skin: No rashes in visible areas.  Eye: Round. Conjunctiva clear, lids normal. No icterus.   ENMT: Lips pink without lesions, good dentition, moist mucous membranes. Phonation normal.  Neck: No masses, no thyromegaly. Moves freely without pain.  Respiratory: Unlabored respiratory effort, no cough or audible wheeze  Psych: Alert and oriented x3, normal affect and mood.        Assessment and Plan:   The following treatment plan was discussed    1. Diabetes mellitus type 2 in nonobese (HCC)  - Referral to Ophthalmology    2. Stage 3b chronic kidney disease (HCC)    3. Glaucoma of both eyes, unspecified glaucoma type  - Referral to Ophthalmology    4. Shortness of breath    5. AMD (age-related macular degeneration), bilateral  - Referral to Ophthalmology    Other orders  - Obtain Results: Other (see comment) (labs ordered by Dr. العلي); Obtain Results From: [facility]      Followup: Return for As Scheduled in January.

## 2022-08-30 NOTE — ASSESSMENT & PLAN NOTE
She has narrow angle glaucoma she had iridotomy in the past.   She is followed by ophthalmology and has eye injections done periodically in left eye.   2-3 wks ago vision was blurry.   She now has to use a magnifying glass for reading, looking at prices at the store, elevator buttons.

## 2022-08-30 NOTE — ASSESSMENT & PLAN NOTE
Last GFR 40 in FEb 2022  Repeat labs were done yesterday at [place]  I will see follow up labs in 3 months sooner if worsening labs.

## 2022-09-15 DIAGNOSIS — N18.32 STAGE 3B CHRONIC KIDNEY DISEASE: ICD-10-CM

## 2022-10-12 ENCOUNTER — OFFICE VISIT (OUTPATIENT)
Dept: MEDICAL GROUP | Facility: PHYSICIAN GROUP | Age: 87
End: 2022-10-12
Payer: MEDICARE

## 2022-10-12 VITALS
HEIGHT: 63 IN | SYSTOLIC BLOOD PRESSURE: 132 MMHG | OXYGEN SATURATION: 90 % | RESPIRATION RATE: 14 BRPM | WEIGHT: 173 LBS | BODY MASS INDEX: 30.65 KG/M2 | HEART RATE: 68 BPM | DIASTOLIC BLOOD PRESSURE: 60 MMHG | TEMPERATURE: 97.2 F

## 2022-10-12 DIAGNOSIS — H35.30 AMD (AGE-RELATED MACULAR DEGENERATION), BILATERAL: ICD-10-CM

## 2022-10-12 DIAGNOSIS — M25.532 LEFT WRIST PAIN: ICD-10-CM

## 2022-10-12 DIAGNOSIS — E11.9 DIABETES MELLITUS TYPE 2 IN NONOBESE (HCC): ICD-10-CM

## 2022-10-12 PROCEDURE — 99214 OFFICE O/P EST MOD 30 MIN: CPT | Performed by: NURSE PRACTITIONER

## 2022-10-12 ASSESSMENT — FIBROSIS 4 INDEX: FIB4 SCORE: 1.71

## 2022-10-12 NOTE — ASSESSMENT & PLAN NOTE
Noticed pain approx 1 mo ago when she was pulling herself up on the steps of the bus at that time  Reaching up to grap and pull was painful   Denies hx of injury  Once it starts hurting, it can hurt all night  Tried an elastic   band called a c band on it and wasn't sure if it was helpful  Reports feeling as if the area is bruised but all over; travels to the hand and up the arm to the shoulder  Doesn't take otc meds as it's not that med  Pronating hurts   Is able to  and doesn't report weakness

## 2022-10-12 NOTE — PROGRESS NOTES
"Subjective:     CC:   Chief Complaint   Patient presents with    Arm Pain     Left, can turn wrist  x 1m.o     Referral Needed     Eye doctor, rental specialist has a referral placed , just want confirmation if they can help        HPI:   Amada presents today with    Left wrist pain  Noticed pain approx 1 mo ago when she was pulling herself up on the steps of the bus at that time  Reaching up to grap and pull was painful   Denies hx of injury  Once it starts hurting, it can hurt all night  Tried an elastic   band called a c band on it and wasn't sure if it was helpful  Reports feeling as if the area is bruised but all over; travels to the hand and up the arm to the shoulder  Doesn't take otc meds as it's not that med  Pronating hurts   Is able to  and doesn't report weakness        AMD (age-related macular degeneration), bilateral  Has referral for [facility]  Advised to have Dr Long send records of injections to them                 ROS per HPI    Objective:     Exam:  /60   Pulse 68   Temp 36.2 °C (97.2 °F) (Temporal)   Resp 14   Ht 1.6 m (5' 3\") Comment: pt stated  Wt 78.5 kg (173 lb) Comment: pt stated  SpO2 90%   BMI 30.65 kg/m²  Body mass index is 30.65 kg/m².    Physical Exam:  Constitutional: Well-developed and well-nourished female  Not diaphoretic. No distress.   Skin: warm, dry, intact, no evidence of rash or concerning lesions  Head: Atraumatic without lesions.  Eyes: Conjunctivae are normal. Pupils are equal, round. No scleral icterus.   Ears:  External ears unremarkable.   Neck: Supple, trachea midline. No thyromegaly present. No cervical or supraclavicular lymphadenopathy.  Cardiovascular: Regular rate and rhythm without murmur.   Pulmonary: Clear to ausculation. Normal effort. No rales, ronchi, or wheezing.  Extremities: No cyanosis, clubbing, erythema, nor edema.   Neurological: Alert and oriented x 3.   Psychiatric:  Behavior, mood, and affect are " appropriate.        Assessment & Plan:     89 y.o. female with the following -     1. Left wrist pain    2. AMD (age-related macular degeneration), bilateral    3. Diabetes mellitus type 2 in nonobese (HCC)  - HEMOGLOBIN A1C; Future   Offered patient x-ray but she will be seeing MARIA D soon and will get it done then a  offered diclofenac but she declined  patient will continue to monitor    Also advised patient to follow-up with her eye care provider they are an ophthalmologist so they can do the dilated eye exams or what ever else she needs   advised her to sign a release from her previous ophthalmologist so her the new provider can have the most updated records    Return in about 4 weeks (around 11/9/2022) for gen check in as scheduled w/PCP .    Please note that this dictation was created using voice recognition software. I have made every reasonable attempt to correct obvious errors, but I expect that there are errors of grammar and possibly content that I did not discover before finalizing the note.

## 2022-11-08 ENCOUNTER — OFFICE VISIT (OUTPATIENT)
Dept: SLEEP MEDICINE | Facility: MEDICAL CENTER | Age: 87
End: 2022-11-08
Payer: MEDICARE

## 2022-11-08 VITALS
HEART RATE: 77 BPM | DIASTOLIC BLOOD PRESSURE: 60 MMHG | SYSTOLIC BLOOD PRESSURE: 134 MMHG | BODY MASS INDEX: 30.48 KG/M2 | HEIGHT: 63 IN | OXYGEN SATURATION: 93 % | WEIGHT: 172 LBS

## 2022-11-08 DIAGNOSIS — R06.02 SOB (SHORTNESS OF BREATH): ICD-10-CM

## 2022-11-08 DIAGNOSIS — I35.1 AORTIC VALVE INSUFFICIENCY, ETIOLOGY OF CARDIAC VALVE DISEASE UNSPECIFIED: ICD-10-CM

## 2022-11-08 DIAGNOSIS — J45.20 MILD INTERMITTENT ASTHMA WITHOUT COMPLICATION: ICD-10-CM

## 2022-11-08 PROCEDURE — 99214 OFFICE O/P EST MOD 30 MIN: CPT | Performed by: INTERNAL MEDICINE

## 2022-11-08 ASSESSMENT — FIBROSIS 4 INDEX: FIB4 SCORE: 1.71

## 2022-11-08 NOTE — PROGRESS NOTES
Chief Complaint   Patient presents with    Follow-Up     Last seen 08/10/22       HPI:  The patient is a 89-year-old woman who has been followed for possible reactive airways disease.  She has not tolerated inhaled steroids because of voice issues.  She uses albuterol on an as-needed basis.    Recent spirometry shows a stable FEV1 and FVC.  The patient has a history of hypertension and mild aortic insufficiency and is followed by Dr. العلي at [place].  Her echocardiogram in August 2021 showed a normal ejection fraction with no right-sided abnormalities.  Prior echocardiograms have suggested diastolic dysfunction.  She does have a history of an RVSP of 38 mmHg.  She has no history of fever, chills, cough or sputum production.    She has been using her albuterol at night whether she needs it or not.  This sometimes causes her to cough.  We discussed only using the albuterol if she is short of breath or wheezing.  She is a retired  with a degree in physics who worked at [employer] for many years at a time when there were not very many women in that occupation.    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    Aortic regurgitation 10/4/2012    ASTHMA     Breast cancer ([facility]) 8/8/2012    Bronchitis     Cancer ([facility])     Carotid artery plaque 10/4/2012    Chickenpox     Depression 8/8/2012    Diabetes     Diabetes mellitus type 2 in nonobese ([facility]) 8/8/2012    GERD (gastroesophageal reflux disease)     Japanese measles     Glaucoma     History of colonoscopy 8/8/2012    History of mammogram 8/8/2012    Hyperlipidemia 8/8/2012    Hypertension 8/8/2012    Hypothyroidism 8/8/2012    Influenza     Mumps     Nasal drainage     Obesity     Rosacea     Thyroid disease     Whooping cough        ROS:   Constitutional: Denies fevers, chills, night sweats, fatigue or weight loss  Eyes: Denies vision loss, pain, drainage, double vision  Ears,  Nose, Throat: Denies earache, tinnitus, hoarseness  Cardiovascular: Denies chest pain, tightness, palpitations  Respiratory: See HPI  Sleep: Denies, snoring, apnea  GI: Denies abdominal pain, nausea, vomiting, diarrhea  : Denies frequent urination, hematuria, painful urination  Musculoskeletal: Denies back pain, painful joints, sore muscles  Neurological: Denies headaches, seizures  Skin: Denies rashes, color changes  Psychiatric: Denies depression or thoughts of suicide  Hematologic: Denies bleeding tendency or clotting tendency  Allergic/Immunologic: Denies rhinitis, skin sensitivity    Social History     Socioeconomic History    Marital status: Single     Spouse name: Not on file    Number of children: Not on file    Years of education: Not on file    Highest education level: Not on file   Occupational History    Not on file   Tobacco Use    Smoking status: Never    Smokeless tobacco: Never   Vaping Use    Vaping Use: Never used   Substance and Sexual Activity    Alcohol use: No    Drug use: No    Sexual activity: Not Currently   Other Topics Concern    Not on file   Social History Narrative    Not on file     Social Determinants of Health     Financial Resource Strain: Not on file   Food Insecurity: Not on file   Transportation Needs: Not on file   Physical Activity: Not on file   Stress: Not on file   Social Connections: Not on file   Intimate Partner Violence: Not on file   Housing Stability: Not on file     Atorvastatin, Bacitracin, Clarithromycin, Linaclotide, Pcn [penicillins], and Sulfa drugs  Current Outpatient Medications on File Prior to Visit   Medication Sig Dispense Refill    metronidazole (NORITATE) 1 % cream apply by topical route  every day a thin layer to the affected area(s)      losartan (COZAAR) 100 MG Tab Take 1 Tablet by mouth every day.      DILTIAZem CD (CARDIZEM CD) 360 MG CAPSULE SR 24 HR Take 1 Capsule by mouth every day.      fluocinolone (SYNALAR) 0.025 % cream Apply 1 gram  topically once a day to affected skin 60 g 3    ketoconazole (NIZORAL) 2 % Cream Use one gram on affected skin once a day 60 g 3    levothyroxine (SYNTHROID) 75 MCG Tab Take 1 Tablet by mouth 1 time a day as needed. 90 Tablet 3    Aflibercept (EYLEA) 2 MG/0.05ML Solution inject 0.05 milliliter by intravitreal route  every 4 weeks for 5 doses then every 8 weeks      albuterol 108 (90 Base) MCG/ACT Aero Soln inhalation aerosol Inhale 2 Puffs every day.      aspirin 81 MG EC tablet Take 1 Tablet by mouth every day.      Calcium Citrate-Vitamin D 200-125 MG-UNIT Tab       vitamin D (VITAMIND D3) 1000 UNIT Tab Take 1 Tablet by mouth.      escitalopram (LEXAPRO) 10 MG Tab Take 1 Tablet by mouth every day.      Probiotic Product (ALIGN) Cap Take 1 Capsule by mouth every day.      metFORMIN ER (GLUCOPHAGE XR) 500 MG TABLET SR 24 HR       rosuvastatin (CRESTOR) 10 MG Tab       metronidazole (METROGEL) 0.75 % gel Apply  topically 2 times a day.      fenofibrate (TRIGLIDE) 160 MG tablet Take 1 Tablet by mouth every day.      Clobetasol Prop-Niacinamide 0.05-4 % Ointment Apply  topically.      fexofenadine (ALLEGRA) 180 MG tablet Take 1 Tablet by mouth every day.      Multiple Vitamins-Minerals (PRESERVISION AREDS 2 PO) Take  by mouth.      montelukast (SINGULAIR) 10 MG TABS Take 1 Tab by mouth every day. 90 Tab 3    fenofibrate (TRIGLIDE) 160 MG tablet Take 1 Tablet by mouth every day. (Patient not taking: Reported on 11/8/2022)      azelastine (ASTELIN) 137 MCG/SPRAY nasal spray Administer 1 Spray into affected nostril(S) 2 times a day. (Patient not taking: Reported on 11/8/2022)      fluticasone (FLONASE) 50 MCG/ACT nasal spray Administer 1 Spray into affected nostril(S) every day. (Patient not taking: Reported on 11/8/2022)      diltiazem CD (CARDIZEM CD) 360 MG ER capsule  (Patient not taking: Reported on 11/8/2022)       No current facility-administered medications on file prior to visit.     /60 (BP Location: Right  "arm, Patient Position: Sitting, BP Cuff Size: Adult)   Pulse 77   Ht 1.6 m (5' 3\")   Wt 78 kg (172 lb)   SpO2 93%   Family History   Problem Relation Age of Onset    Heart Disease Mother          age 88, CAD    GI Disease Father         cirrhosis, CHF    Heart Disease Father     Heart Failure Father     Other Father        Physical Exam:  Elderly, no distress  HEENT: PERRLA, EOMI, no scleral icterus, no nasal or oral lesions  Neck: No thyromegaly, no adenopathy, no bruits  Mallampatti: Grade II  Lungs: Equal breath sounds, no wheezes or crackles  Heart: Regular rate and rhythm, no gallops or murmurs on today's exam  Abdomen: Soft, benign, no organomegaly  Extremities: No clubbing, cyanosis, or edema  Neurologic: Cranial nerve, motor, and sensory exam are normal    1. Mild intermittent asthma without complication    2. Aortic valve insufficiency, etiology of cardiac valve disease unspecified    3. SOB (shortness of breath)      She has mild obstructive lung disease which is currently controlled with albuterol on an as-needed basis.  She will continue to follow-up with cardiology.  We will see her back in 1 year or sooner if she has issues.  Because of her age and the fact that she lives in [place] I did suggest that we see her via telemedicine.      "

## 2023-01-05 ENCOUNTER — APPOINTMENT (OUTPATIENT)
Dept: MEDICAL GROUP | Facility: PHYSICIAN GROUP | Age: 88
End: 2023-01-05
Payer: MEDICARE

## 2023-01-09 ENCOUNTER — TELEPHONE (OUTPATIENT)
Dept: URGENT CARE | Facility: PHYSICIAN GROUP | Age: 88
End: 2023-01-09
Payer: MEDICARE

## 2023-01-10 ENCOUNTER — APPOINTMENT (OUTPATIENT)
Dept: SLEEP MEDICINE | Facility: MEDICAL CENTER | Age: 88
End: 2023-01-10
Payer: MEDICARE

## 2023-01-10 ENCOUNTER — OFFICE VISIT (OUTPATIENT)
Dept: MEDICAL GROUP | Facility: PHYSICIAN GROUP | Age: 88
End: 2023-01-10
Payer: MEDICARE

## 2023-01-10 VITALS
DIASTOLIC BLOOD PRESSURE: 66 MMHG | HEIGHT: 63 IN | BODY MASS INDEX: 31.22 KG/M2 | HEART RATE: 90 BPM | SYSTOLIC BLOOD PRESSURE: 164 MMHG | WEIGHT: 176.2 LBS | OXYGEN SATURATION: 93 % | RESPIRATION RATE: 18 BRPM | TEMPERATURE: 97.7 F

## 2023-01-10 DIAGNOSIS — I10 PRIMARY HYPERTENSION: ICD-10-CM

## 2023-01-10 DIAGNOSIS — E11.9 DIABETES MELLITUS TYPE 2 IN NONOBESE (HCC): ICD-10-CM

## 2023-01-10 PROBLEM — N18.31 STAGE 3A CHRONIC KIDNEY DISEASE: Status: ACTIVE | Noted: 2022-01-20

## 2023-01-10 PROCEDURE — 99214 OFFICE O/P EST MOD 30 MIN: CPT | Performed by: NURSE PRACTITIONER

## 2023-01-10 RX ORDER — LANCETS 30 GAUGE
EACH MISCELLANEOUS
Qty: 100 EACH | Refills: 3 | Status: SHIPPED | OUTPATIENT
Start: 2023-01-10

## 2023-01-10 RX ORDER — DILTIAZEM HYDROCHLORIDE 360 MG/1
360 CAPSULE, EXTENDED RELEASE ORAL DAILY
COMMUNITY
Start: 2022-11-08 | End: 2023-01-10

## 2023-01-10 RX ORDER — SYRING-NEEDL,DISP,INSUL,0.3 ML 30 GX5/16"
1 SYRINGE, EMPTY DISPOSABLE MISCELLANEOUS DAILY
Qty: 1 EACH | Refills: 1 | Status: SHIPPED | OUTPATIENT
Start: 2023-01-10 | End: 2023-04-27 | Stop reason: SDUPTHER

## 2023-01-10 ASSESSMENT — FIBROSIS 4 INDEX: FIB4 SCORE: 1.71

## 2023-01-10 ASSESSMENT — PATIENT HEALTH QUESTIONNAIRE - PHQ9: CLINICAL INTERPRETATION OF PHQ2 SCORE: 0

## 2023-01-10 NOTE — PROGRESS NOTES
Chief Complaint   Patient presents with    Medication Refill     Pt is here for medication refill, needs diabetes supplies sent over        Subjective:     HPI:   Amada Xavier is a 89 y.o. female here to discuss the evaluation and management of:      Diabetes mellitus type 2 in nonobese  Chronic condition.  Patient is overdue for labs.  She is requesting today for medical supplies to monitor blood sugars to be reordered.  She is currently on metformin 500 mg twice daily.  Patient denies any hypoglycemic events or symptoms.  She reports he is followed closely with ophthalmology and retinal specialist and will bring in retinal eye exam at next visit.  Patient is appropriately on statin and losartan.    Hypertension  Chronic uncontrolled.  Blood pressure today in clinic is elevated 142/82.  Patient is managed by cardiology.  She is currently on Cardizem CD3 160 mg capsule every 24 hours and losartan 100 mg daily.  Patient denies any headaches, dizziness, chest pain, or shortness of breath.        ROS:  Gen: no fevers/chills, no changes in weight  Pulm: no sob, no cough  CV: no chest pain, no palpitations  GI: no nausea/vomiting, no diarrhea      Allergies   Allergen Reactions    Atorvastatin     Bacitracin     Clarithromycin Hives    Linaclotide Diarrhea and Hives     Severe diarrhea  Severe diarrhea    Pcn [Penicillins]     Sulfa Drugs        Current medicines (including changes today)  Current Outpatient Medications   Medication Sig Dispense Refill    Lancets Lancets order: Lancets for insurance approved meter. Sig: Use 1 lancet once daily to check blood sugar #100 RF x 3 100 Each 3    Blood Glucose Test Strips Test strips order: Test strips for insurance to approve meter. Sig: Use 1 test strip daily to check blood sugars #100 RF x 3 100 Strip 3    Blood Glucose Monitoring Suppl Device Meter: Dispense Device of Insurance Preference. Sig. Use as directed for blood sugar monitoring. #1. NR. 1 Each 0     Lancet Device [facility] 1 Applicator every day. For insurance approved meter, lancet, and test strips. 1 Each 1    metronidazole (NORITATE) 1 % cream apply by topical route  every day a thin layer to the affected area(s)      losartan (COZAAR) 100 MG Tab Take 1 Tablet by mouth every day.      fenofibrate (TRIGLIDE) 160 MG tablet Take 1 Tablet by mouth every day.      fluocinolone (SYNALAR) 0.025 % cream Apply 1 gram topically once a day to affected skin 60 g 3    ketoconazole (NIZORAL) 2 % Cream Use one gram on affected skin once a day 60 g 3    levothyroxine (SYNTHROID) 75 MCG Tab Take 1 Tablet by mouth 1 time a day as needed. 90 Tablet 3    Aflibercept (EYLEA) 2 MG/0.05ML Solution inject 0.05 milliliter by intravitreal route  every 4 weeks for 5 doses then every 8 weeks      albuterol 108 (90 Base) MCG/ACT Aero Soln inhalation aerosol Inhale 2 Puffs every day.      aspirin 81 MG EC tablet Take 1 Tablet by mouth every day.      azelastine (ASTELIN) 137 MCG/SPRAY nasal spray Administer 1 Spray into affected nostril(S) 2 times a day.      Calcium Citrate-Vitamin D 200-125 MG-UNIT Tab       vitamin D (VITAMIND D3) 1000 UNIT Tab Take 1 Tablet by mouth.      escitalopram (LEXAPRO) 10 MG Tab Take 1 Tablet by mouth every day.      fluticasone (FLONASE) 50 MCG/ACT nasal spray Administer 1 Spray into affected nostril(S) every day.      Probiotic Product (ALIGN) Cap Take 1 Capsule by mouth every day.      metFORMIN ER (GLUCOPHAGE XR) 500 MG TABLET SR 24 HR       rosuvastatin (CRESTOR) 10 MG Tab       diltiazem CD (CARDIZEM CD) 360 MG ER capsule       metronidazole (METROGEL) 0.75 % gel Apply  topically 2 times a day.      Clobetasol Prop-Niacinamide 0.05-4 % Ointment Apply  topically.      fexofenadine (ALLEGRA) 180 MG tablet Take 1 Tablet by mouth every day.      Multiple Vitamins-Minerals (PRESERVISION AREDS 2 PO) Take  by mouth.      montelukast (SINGULAIR) 10 MG TABS Take 1 Tab by mouth every day. 90 Tab 3     No current  "facility-administered medications for this visit.          Objective:       BP (!) 164/66 (BP Location: Right arm, Patient Position: Sitting, BP Cuff Size: Adult)   Pulse 90   Temp 36.5 °C (97.7 °F) (Temporal)   Resp 18   Ht 1.6 m (5' 3\")   Wt 79.9 kg (176 lb 3.2 oz)   SpO2 93%  Body mass index is 31.21 kg/m².    Physical Exam:  Constitutional: Well-developed and well-nourished female in NAD. Not diaphoretic. No distress.   Skin: warm, dry, intact  Cardiovascular: Regular rate and rhythm without murmur. Radial pulses are intact and equal bilaterally.  Pulmonary: Clear to ausculation. Normal effort. No rales, ronchi, or wheezing.  Extremities: No cyanosis, clubbing, erythema, nor edema.   Monofilament testing with a 10 gram force: sensation intact: decreased bilaterally  Visual Inspection: Feet without maceration, ulcers, fissures.  Pedal pulses: intact bilaterally  Neurological: Alert and oriented x 3.   Psychiatric:  Behavior, mood, and affect are appropriate.      Assessment and Plan:     The following treatment plan was discussed:      1. Diabetes mellitus type 2 in nonobese (HCC)  Chronic condition.  Patient is overdue for labs.  I personally reviewed pertinent notes and labs and discussed results with patient.  Patient will continue on metformin 500 mg twice daily.  Discussed appropriate diet lifestyle modifications.  Monofilament exam performed today in clinic showing mild decrease in sensation bilaterally.  Patient does report chronic bilateral peripheral neuropathy that has mildly worsened since last appointment.  Refill of lancets, blood glucose test strips, blood glucose monitoring  5 device was all sent to [facility] per patient request.  Referral placed to podiatry today to help with nail trims.  Patient will bring in recent retinal exam performed by either ophthalmology or her retinal specialist.  - Lancets; Lancets order: Lancets for insurance approved meter. Sig: Use 1 lancet " once daily to check blood sugar #100 RF x 3  Dispense: 100 Each; Refill: 3  - Blood Glucose Test Strips; Test strips order: Test strips for insurance to approve meter. Sig: Use 1 test strip daily to check blood sugars #100 RF x 3  Dispense: 100 Strip; Refill: 3  - Blood Glucose Monitoring Suppl Device; Meter: Dispense Device of Insurance Preference. Sig. Use as directed for blood sugar monitoring. #1. NR.  Dispense: 1 Each; Refill: 0  - Lancet Device Misc; 1 Applicator every day. For insurance approved meter, lancet, and test strips.  Dispense: 1 Each; Refill: 1  - Lipid Profile; Future  - Microalbumin Creat Ratio Urine - Lab Collect; Future  - Diabetic Monofilament Lower Extremity Exam  - Referral to Podiatry    2. Primary hypertension  Chronic and uncontrolled condition.  Patient will continue on her diltiazem CD3 160 mg daily and losartan 100 mg daily.  Patient has follow-up appointment scheduled Dr. Kaufman.  Recommended patient follow-up with cardiology as well.      Any change or worsening of signs or symptoms, patient encouraged to follow-up or report to urgent care or emergency room for further evaluation. Patient verbalizes understanding and agrees.    Follow-Up: Return in about 6 weeks (around 2/23/2023).      PLEASE NOTE: This dictation was created using voice recognition software. I have made every reasonable attempt to correct obvious errors, but I expect that there are errors of grammar and possibly content that I did not discover before finalizing the note.

## 2023-01-10 NOTE — ASSESSMENT & PLAN NOTE
Chronic condition.  Patient is overdue for labs.  She is requesting today for medical supplies to monitor blood sugars to be reordered.  She is currently on metformin 500 mg twice daily.  Patient denies any hypoglycemic events or symptoms.  She reports he is followed closely with ophthalmology and retinal specialist and will bring in retinal eye exam at next visit.  Patient is appropriately on statin and losartan.

## 2023-01-10 NOTE — ASSESSMENT & PLAN NOTE
Chronic uncontrolled.  Blood pressure today in clinic is elevated 142/82.  Patient is managed by cardiology.  She is currently on Cardizem CD3 160 mg capsule every 24 hours and losartan 100 mg daily.  Patient denies any headaches, dizziness, chest pain, or shortness of breath.

## 2023-01-23 ENCOUNTER — TELEPHONE (OUTPATIENT)
Dept: URGENT CARE | Facility: PHYSICIAN GROUP | Age: 88
End: 2023-01-23
Payer: MEDICARE

## 2023-01-23 NOTE — TELEPHONE ENCOUNTER
Received request via: Patient    Was the patient seen in the last year in this department? Yes    Does the patient have an active prescription (recently filled or refills available) for medication(s) requested? No    Does the patient have [facility] and need 100 day supply (blood pressure, diabetes and cholesterol meds only)? Patient does not have SCP    Last office Visit:01/10/2023  Last Labs:06/30/2022

## 2023-01-24 RX ORDER — METFORMIN HYDROCHLORIDE 500 MG/1
500 TABLET, EXTENDED RELEASE ORAL 2 TIMES DAILY
Qty: 180 TABLET | Refills: 3 | Status: SHIPPED | OUTPATIENT
Start: 2023-01-24 | End: 2023-01-27 | Stop reason: SDUPTHER

## 2023-01-25 ENCOUNTER — TELEPHONE (OUTPATIENT)
Dept: URGENT CARE | Facility: PHYSICIAN GROUP | Age: 88
End: 2023-01-25

## 2023-01-27 RX ORDER — METFORMIN HYDROCHLORIDE 500 MG/1
500 TABLET, EXTENDED RELEASE ORAL 2 TIMES DAILY
Qty: 180 TABLET | Refills: 3 | Status: SHIPPED | OUTPATIENT
Start: 2023-01-27 | End: 2024-02-05

## 2023-02-22 LAB — HBA1C MFR BLD: 6.6 % (ref ?–5.8)

## 2023-02-23 ENCOUNTER — OFFICE VISIT (OUTPATIENT)
Dept: MEDICAL GROUP | Facility: PHYSICIAN GROUP | Age: 88
End: 2023-02-23
Payer: MEDICARE

## 2023-02-23 VITALS
OXYGEN SATURATION: 93 % | TEMPERATURE: 97 F | WEIGHT: 176 LBS | HEART RATE: 100 BPM | SYSTOLIC BLOOD PRESSURE: 136 MMHG | DIASTOLIC BLOOD PRESSURE: 78 MMHG | RESPIRATION RATE: 14 BRPM | HEIGHT: 62 IN | BODY MASS INDEX: 32.39 KG/M2

## 2023-02-23 DIAGNOSIS — H61.23 BILATERAL IMPACTED CERUMEN: ICD-10-CM

## 2023-02-23 DIAGNOSIS — Z23 NEED FOR VACCINATION: ICD-10-CM

## 2023-02-23 DIAGNOSIS — H40.9 GLAUCOMA OF BOTH EYES, UNSPECIFIED GLAUCOMA TYPE: ICD-10-CM

## 2023-02-23 DIAGNOSIS — E11.9 DIABETES MELLITUS TYPE 2 IN NONOBESE (HCC): ICD-10-CM

## 2023-02-23 PROCEDURE — 99214 OFFICE O/P EST MOD 30 MIN: CPT | Mod: 25 | Performed by: FAMILY MEDICINE

## 2023-02-23 PROCEDURE — G0009 ADMIN PNEUMOCOCCAL VACCINE: HCPCS | Performed by: FAMILY MEDICINE

## 2023-02-23 PROCEDURE — 90677 PCV20 VACCINE IM: CPT | Performed by: FAMILY MEDICINE

## 2023-02-23 RX ORDER — DILTIAZEM HYDROCHLORIDE 360 MG/1
1 CAPSULE, EXTENDED RELEASE ORAL DAILY
COMMUNITY
Start: 2023-02-23

## 2023-02-23 ASSESSMENT — FIBROSIS 4 INDEX: FIB4 SCORE: 1.71

## 2023-02-23 NOTE — ASSESSMENT & PLAN NOTE
Labs were done at [facility], results requested.   Continues with [facility] for screenings and treatment of macular degeneration, records scanned  Continue on metformin, losartan, rosuvastatin

## 2023-02-23 NOTE — PATIENT INSTRUCTIONS
Please get fasting labs, fasting for 8-10 hours before next visit. You can make an appointment for the lab or walk in.   Lab hours [facility] location: Monday to Friday 6 am - 4 pm, Saturday 7 am -noon  Even if you lose your lab paperwork, you can still come in to get your lab done.

## 2023-02-25 NOTE — PROGRESS NOTES
Subjective:   Amada Xavier is a 89 y.o. female here today for evaluation and management of:     Glaucoma  Chronic condition, she is followed by ophthalmolgy  Recent report brought in by patient, scanned to her EMR.   She gets Eylea injections done to eyes by her retina specialist     Diabetes mellitus type 2 in nonobese  Labs were done at [facility], results requested.   Continues with [facility] for screenings and treatment of macular degeneration, records scanned  Continue on metformin, losartan, rosuvastatin             Current medicines (including changes today)  Current Outpatient Medications   Medication Sig Dispense Refill    dilTIAZem CD (CARDIZEM CD) 360 MG CAPSULE SR 24 HR Take 1 Capsule by mouth every day.      metFORMIN ER (GLUCOPHAGE XR) 500 MG TABLET SR 24 HR Take 1 Tablet by mouth 2 times a day. 180 Tablet 3    Lancets Lancets order: Lancets for insurance approved meter. Sig: Use 1 lancet once daily to check blood sugar #100 RF x 3 100 Each 3    Blood Glucose Test Strips Test strips order: Test strips for insurance to approve meter. Sig: Use 1 test strip daily to check blood sugars #100 RF x 3 100 Strip 3    Blood Glucose Monitoring Suppl Device Meter: Dispense Device of Insurance Preference. Sig. Use as directed for blood sugar monitoring. #1. NR. 1 Each 0    Lancet Device Misc 1 Applicator every day. For insurance approved meter, lancet, and test strips. 1 Each 1    metronidazole (NORITATE) 1 % cream apply by topical route  every day a thin layer to the affected area(s)      losartan (COZAAR) 100 MG Tab Take 1 Tablet by mouth every day.      fenofibrate (TRIGLIDE) 160 MG tablet Take 1 Tablet by mouth every day.      fluocinolone (SYNALAR) 0.025 % cream Apply 1 gram topically once a day to affected skin 60 g 3    ketoconazole (NIZORAL) 2 % Cream Use one gram on affected skin once a day 60 g 3    levothyroxine (SYNTHROID) 75 MCG Tab Take 1 Tablet by mouth 1 time a day as needed. 90 Tablet 3     Aflibercept (EYLEA) 2 MG/0.05ML Solution inject 0.05 milliliter by intravitreal route  every 4 weeks for 5 doses then every 8 weeks      albuterol 108 (90 Base) MCG/ACT Aero Soln inhalation aerosol Inhale 2 Puffs every day.      aspirin 81 MG EC tablet Take 1 Tablet by mouth every day.      azelastine (ASTELIN) 137 MCG/SPRAY nasal spray Administer 1 Spray into affected nostril(S) 2 times a day.      Calcium Citrate-Vitamin D 200-125 MG-UNIT Tab       vitamin D (VITAMIND D3) 1000 UNIT Tab Take 1 Tablet by mouth.      escitalopram (LEXAPRO) 10 MG Tab Take 1 Tablet by mouth every day.      fluticasone (FLONASE) 50 MCG/ACT nasal spray Administer 1 Spray into affected nostril(S) every day.      Probiotic Product (ALIGN) Cap Take 1 Capsule by mouth every day.      rosuvastatin (CRESTOR) 10 MG Tab       Clobetasol Prop-Niacinamide 0.05-4 % Ointment Apply  topically.      fexofenadine (ALLEGRA) 180 MG tablet Take 1 Tablet by mouth every day.      Multiple Vitamins-Minerals (PRESERVISION AREDS 2 PO) Take  by mouth.      montelukast (SINGULAIR) 10 MG TABS Take 1 Tab by mouth every day. 90 Tab 3    diltiazem CD (CARDIZEM CD) 360 MG ER capsule  (Patient not taking: Reported on 2/23/2023)       No current facility-administered medications for this visit.     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    ROS  No chest pain, no  "shortness of breath, no abdominal pain       Objective:     /78 (BP Location: Left arm, Patient Position: Sitting, BP Cuff Size: Adult)   Pulse 100   Temp 36.1 °C (97 °F) (Temporal)   Resp 14   Ht 1.575 m (5' 2\")   Wt 79.8 kg (176 lb)   SpO2 93%  Body mass index is 32.19 kg/m².   Physical Exam:  Constitutional: Alert, no distress.  Skin: Warm, dry, good turgor, no rashes in visible areas.  Eye: Equal, round and reactive, conjunctiva clear, lids normal.  ENMT: Lips without lesions, good dentition, oropharynx clear.  Neck: Trachea midline, no masses, no thyromegaly. No cervical or supraclavicular lymphadenopathy  Respiratory: Unlabored respiratory effort, lungs clear to auscultation, no wheezes, no ronchi.  Cardiovascular: Normal S1, S2, no murmur, no edema.  Abdomen: Soft, non-tender, no masses, no hepatosplenomegaly.  Psych: Alert and oriented x3, normal affect and mood.        Assessment and Plan:   The following treatment plan was discussed    1. Bilateral impacted cerumen  - Ear Irrigation ([facility] Only); Future    2. Need for vaccination  - Pneumococcal Conjugate Vaccine 20-Valent (19 yrs+)    3. Glaucoma of both eyes, unspecified glaucoma type    4. Diabetes mellitus type 2 in nonobese (HCC)  - HEMOGLOBIN A1C; Future  - Comp Metabolic Panel; Future    Other orders  - dilTIAZem CD (CARDIZEM CD) 360 MG CAPSULE SR 24 HR; Take 1 Capsule by mouth every day.  - Obtain Results: Other (see comment) (lab results); Obtain Results From: [facility]      Followup: Return in about 4 months (around 6/23/2023) for pls scan retinal screening, Lab Review, Diabetes.           "

## 2023-02-27 ENCOUNTER — TELEPHONE (OUTPATIENT)
Dept: MEDICAL GROUP | Facility: PHYSICIAN GROUP | Age: 88
End: 2023-02-27
Payer: MEDICARE

## 2023-02-27 NOTE — TELEPHONE ENCOUNTER
Received request via: Patient    Was the patient seen in the last year in this department? Yes    Does the patient have an active prescription (recently filled or refills available) for medication(s) requested? No    Does the patient have [facility] and need 100 day supply (blood pressure, diabetes and cholesterol meds only)? Patient does not have SCP    Last Office Visit:02/23/2023  Last Labs:02/24/2023    It no letting me pend medication need ICD-10 Code     Medication Montelukast 10 Mg

## 2023-03-01 NOTE — TELEPHONE ENCOUNTER
Received request via: Patient    Was the patient seen in the last year in this department? Yes    Does the patient have an active prescription (recently filled or refills available) for medication(s) requested? No    Does the patient have [facility] and need 100 day supply (blood pressure, diabetes and cholesterol meds only)? Patient does not have SCP

## 2023-03-02 DIAGNOSIS — J45.20 MILD INTERMITTENT ASTHMA WITHOUT COMPLICATION: ICD-10-CM

## 2023-03-03 RX ORDER — MONTELUKAST SODIUM 10 MG/1
10 TABLET ORAL DAILY
Qty: 90 TABLET | Refills: 3 | Status: SHIPPED | OUTPATIENT
Start: 2023-03-03 | End: 2023-03-10 | Stop reason: SDUPTHER

## 2023-03-03 NOTE — TELEPHONE ENCOUNTER
Received request via: Patient    Was the patient seen in the last year in this department? Yes    Does the patient have an active prescription (recently filled or refills available) for medication(s) requested? No    Does the patient have [facility] and need 100 day supply (blood pressure, diabetes and cholesterol meds only)? Patient does not have SCP

## 2023-03-10 DIAGNOSIS — J45.20 MILD INTERMITTENT ASTHMA WITHOUT COMPLICATION: ICD-10-CM

## 2023-03-10 RX ORDER — MONTELUKAST SODIUM 10 MG/1
10 TABLET ORAL DAILY
Qty: 90 TABLET | Refills: 3 | Status: SHIPPED | OUTPATIENT
Start: 2023-03-10

## 2023-03-24 NOTE — ASSESSMENT & PLAN NOTE
Chronic condition, she is followed by ophthalmolgy  Recent report brought in by patient, scanned to her EMR.   She gets Eylea injections done to eyes by her retina specialist    4 = No assist / stand by assistance

## 2023-04-20 ENCOUNTER — TELEPHONE (OUTPATIENT)
Dept: MEDICAL GROUP | Facility: PHYSICIAN GROUP | Age: 88
End: 2023-04-20
Payer: MEDICARE

## 2023-04-27 DIAGNOSIS — E11.9 DIABETES MELLITUS TYPE 2 IN NONOBESE (HCC): ICD-10-CM

## 2023-04-27 RX ORDER — SYRING-NEEDL,DISP,INSUL,0.3 ML 30 GX5/16"
SYRINGE, EMPTY DISPOSABLE MISCELLANEOUS
Qty: 300 EACH | Refills: 3 | Status: SHIPPED
Start: 2023-04-27

## 2023-05-30 RX ORDER — LEVOTHYROXINE SODIUM 0.07 MG/1
75 TABLET ORAL
Qty: 90 TABLET | Refills: 3 | Status: SHIPPED | OUTPATIENT
Start: 2023-05-30 | End: 2023-09-01 | Stop reason: SDUPTHER

## 2023-05-30 NOTE — TELEPHONE ENCOUNTER
Received request via: Patient    Was the patient seen in the last year in this department? Yes    Does the patient have an active prescription (recently filled or refills available) for medication(s) requested? No    Does the patient have [facility] and need 100 day supply (blood pressure, diabetes and cholesterol meds only)? Patient does not have SCP    Last Office Visit:02/23/2023  Last Labs;02/24/2023

## 2023-06-01 ENCOUNTER — OFFICE VISIT (OUTPATIENT)
Dept: SLEEP MEDICINE | Facility: MEDICAL CENTER | Age: 88
End: 2023-06-01
Attending: INTERNAL MEDICINE
Payer: MEDICARE

## 2023-06-01 VITALS
WEIGHT: 168 LBS | BODY MASS INDEX: 29.77 KG/M2 | SYSTOLIC BLOOD PRESSURE: 122 MMHG | OXYGEN SATURATION: 93 % | DIASTOLIC BLOOD PRESSURE: 58 MMHG | HEART RATE: 77 BPM | HEIGHT: 63 IN

## 2023-06-01 DIAGNOSIS — J96.91 RESPIRATORY FAILURE WITH HYPOXIA, UNSPECIFIED CHRONICITY (HCC): ICD-10-CM

## 2023-06-01 DIAGNOSIS — R91.8 OTHER NONSPECIFIC ABNORMAL FINDING OF LUNG FIELD: ICD-10-CM

## 2023-06-01 DIAGNOSIS — J45.20 MILD INTERMITTENT ASTHMA WITHOUT COMPLICATION: ICD-10-CM

## 2023-06-01 PROCEDURE — 99212 OFFICE O/P EST SF 10 MIN: CPT | Performed by: INTERNAL MEDICINE

## 2023-06-01 PROCEDURE — 99214 OFFICE O/P EST MOD 30 MIN: CPT | Performed by: INTERNAL MEDICINE

## 2023-06-01 PROCEDURE — 3074F SYST BP LT 130 MM HG: CPT | Performed by: INTERNAL MEDICINE

## 2023-06-01 PROCEDURE — 3078F DIAST BP <80 MM HG: CPT | Performed by: INTERNAL MEDICINE

## 2023-06-01 ASSESSMENT — ENCOUNTER SYMPTOMS
EYE REDNESS: 0
HEMOPTYSIS: 0
NAUSEA: 0
FOCAL WEAKNESS: 0
SORE THROAT: 0
WEAKNESS: 0
EYE DISCHARGE: 0
PHOTOPHOBIA: 0
DIAPHORESIS: 0
TREMORS: 0
PND: 0
SPUTUM PRODUCTION: 0
BLURRED VISION: 0
SPEECH CHANGE: 0
HEARTBURN: 0
SHORTNESS OF BREATH: 0
WEIGHT LOSS: 0
DEPRESSION: 0
PALPITATIONS: 0
HEADACHES: 0
SINUS PAIN: 0
FALLS: 0
FEVER: 0
MYALGIAS: 0
WHEEZING: 0
EYE PAIN: 0
DIARRHEA: 0
CHILLS: 0
DOUBLE VISION: 0
VOMITING: 0
ORTHOPNEA: 0
NECK PAIN: 0
DIZZINESS: 0
BACK PAIN: 0
COUGH: 0
ABDOMINAL PAIN: 0
CLAUDICATION: 0
CONSTIPATION: 0
STRIDOR: 0

## 2023-06-01 ASSESSMENT — FIBROSIS 4 INDEX: FIB4 SCORE: 1.71

## 2023-06-01 NOTE — PROGRESS NOTES
Chief Complaint   Patient presents with    Follow-Up     Last seen 11/8/22 with Dr. Eli          HPI: This patient is a 89 y.o. female whom is followed in our clinic for asthma last seen by Dr Eli on 11/8/22. PMHx is significant for breast cancer status post left mastectomy in 1985, dyslipidemia, hypertension, diabetes, hypothyroid and asthma on only prn LAWANDA as she has not tolerated ICS in the past.  She is a lifelong non-smoker. She is followed by cardiology at [facility].  She was hospitalized in October of 2021 at [place] for GI illness during which CTA was obtained, did not show PE but did show ? Interstitial infiltrates vs. Pneumonitis I suspect due to possible aspiration vs. Volume overload after resuscitation. O2 was low during her stay but had improved by the time I saw her in February 2022.  She was briefly hypoxic during a visit with Dr. Eli on 2/28/22 an O2 was ordered but at f/u in May 2022 her O2 had normalized.  sHe has had issues with bradycardia in the past which improved after stopping bystolic and respiratory issues stabilized. She is on albuterol only which she schedules once daily. Spirometry from 8/2022 showed FEV/FVC of 82, FEV1 of 92% pred, FVC of 84% pred, no BD response. TTE also from 8/2022 was notable only for grade I diastolic dysfx.  Since our last visit she was seen in ED at [place] on 3/26 for dizziness, which was attributed to UTI and she was given abx, CXR at that time showed ? Nodule RLL and f/u was recommended. She returned to ED on 4/10, pt states for instability on her feet but denied any respiratory sxs such as cough or SOB. She was noted to be hypoxemic on arrival with SpO2 of 78%. Her CXR was read as less conspicuous RLL nodule and otherwise clear. CT head was unremarkable. She was tx with antibiotics for ? Pna and discharged on RA after 3 days. Her sxs have improved and she denies any respiratory sxs prior to, during or after the event. She has seen  cardiology and repeat TTE ordered.     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    Aortic regurgitation 10/4/2012    ASTHMA     Breast cancer (HCC) 8/8/2012    Bronchitis     Cancer ([facility])     Carotid artery plaque 10/4/2012    Chickenpox     Depression 8/8/2012    Diabetes     Diabetes mellitus type 2 in nonobese ([facility]) 8/8/2012    GERD (gastroesophageal reflux disease)     [place] measles     Glaucoma     History of colonoscopy 8/8/2012    History of mammogram 8/8/2012    Hyperlipidemia 8/8/2012    Hypertension 8/8/2012    Hypothyroidism 8/8/2012    Influenza     Mumps     Nasal drainage     Obesity     Rosacea     Thyroid disease     Whooping cough        Social History     Socioeconomic History    Marital status: Single     Spouse name: Not on file    Number of children: Not on file    Years of education: Not on file    Highest education level: Not on file   Occupational History    Not on file   Tobacco Use    Smoking status: Never    Smokeless tobacco: Never   Vaping Use    Vaping Use: Never used   Substance and Sexual Activity    Alcohol use: No    Drug use: No    Sexual activity: Not Currently   Other Topics Concern    Not on file   Social History Narrative    Not on file     Social Determinants of Health     Financial Resource Strain: Low Risk  (10/21/2021)    Overall Financial Resource Strain (CARDIA)     Difficulty of Paying Living Expenses: Not hard at all   Food Insecurity: No Food Insecurity (10/21/2021)    Hunger Vital Sign     Worried About Running Out of Food in the Last Year: Never true     Ran Out of Food in the Last Year: Never true   Transportation Needs: No Transportation Needs (10/21/2021)    PRAPARE - Transportation     Lack of Transportation (Medical): No     Lack of Transportation (Non-Medical): No   Physical Activity: Not on file   Stress: Not on file   Social Connections: Not on file   Intimate Partner  Violence: Not on file   Housing Stability: Unknown (10/21/2021)    Housing Stability Vital Sign     Unable to Pay for Housing in the Last Year: No     Number of Places Lived in the Last Year: Not on file     Unstable Housing in the Last Year: No       Family History   Problem Relation Age of Onset    Heart Disease Mother          age 88, CAD    GI Disease Father         cirrhosis, CHF    Heart Disease Father     Heart Failure Father     Other Father        Current Outpatient Medications on File Prior to Visit   Medication Sig Dispense Refill    levothyroxine (SYNTHROID) 75 MCG Tab Take 1 Tablet by mouth every morning on an empty stomach. 90 Tablet 3    Blood Glucose Test Strips Test strips order: Test strips for insurance to approve meter. Sig: Use 1 test strip daily to check blood sugars one to three times a day#300 RF x 3 300 Strip 3    Blood Glucose Monitoring Suppl Device Meter: Dispense Device of Insurance Preference. Sig. Use as directed for blood sugar monitoring. #1. NR. 1 Each 0    Lancet Device Misc Check blood sugars fasting and after meals daily #300 with 3 refills 300 Each 3    montelukast (SINGULAIR) 10 MG Tab Take 1 Tablet by mouth every day. 90 Tablet 3    dilTIAZem CD (CARDIZEM CD) 360 MG CAPSULE SR 24 HR Take 1 Capsule by mouth every day.      metFORMIN ER (GLUCOPHAGE XR) 500 MG TABLET SR 24 HR Take 1 Tablet by mouth 2 times a day. 180 Tablet 3    Lancets Lancets order: Lancets for insurance approved meter. Sig: Use 1 lancet once daily to check blood sugar #100 RF x 3 100 Each 3    metronidazole (NORITATE) 1 % cream apply by topical route  every day a thin layer to the affected area(s)      losartan (COZAAR) 100 MG Tab Take 1 Tablet by mouth every day.      fenofibrate (TRIGLIDE) 160 MG tablet Take 1 Tablet by mouth every day.      fluocinolone (SYNALAR) 0.025 % cream Apply 1 gram topically once a day to affected skin 60 g 3    ketoconazole (NIZORAL) 2 % Cream Use one gram on affected skin once  a day 60 g 3    Aflibercept (EYLEA) 2 MG/0.05ML Solution inject 0.05 milliliter by intravitreal route  every 4 weeks for 5 doses then every 8 weeks      albuterol 108 (90 Base) MCG/ACT Aero Soln inhalation aerosol Inhale 2 Puffs every day.      aspirin 81 MG EC tablet Take 1 Tablet by mouth every day.      azelastine (ASTELIN) 137 MCG/SPRAY nasal spray Administer 1 Spray into affected nostril(S) 2 times a day.      Calcium Citrate-Vitamin D 200-125 MG-UNIT Tab       vitamin D (VITAMIND D3) 1000 UNIT Tab Take 1 Tablet by mouth.      escitalopram (LEXAPRO) 10 MG Tab Take 1 Tablet by mouth every day.      fluticasone (FLONASE) 50 MCG/ACT nasal spray Administer 1 Spray into affected nostril(S) every day.      Probiotic Product (ALIGN) Cap Take 1 Capsule by mouth every day.      rosuvastatin (CRESTOR) 10 MG Tab       Clobetasol Prop-Niacinamide 0.05-4 % Ointment Apply  topically.      fexofenadine (ALLEGRA) 180 MG tablet Take 1 Tablet by mouth every day.      Multiple Vitamins-Minerals (PRESERVISION AREDS 2 PO) Take  by mouth.       No current facility-administered medications on file prior to visit.       Atorvastatin, Bacitracin, Clarithromycin, Linaclotide, Pcn [penicillins], and Sulfa drugs      ROS:   Review of Systems   Constitutional:  Negative for chills, diaphoresis, fever, malaise/fatigue and weight loss.   HENT:  Negative for congestion, ear discharge, ear pain, hearing loss, nosebleeds, sinus pain, sore throat and tinnitus.    Eyes:  Negative for blurred vision, double vision, photophobia, pain, discharge and redness.   Respiratory:  Negative for cough, hemoptysis, sputum production, shortness of breath, wheezing and stridor.    Cardiovascular:  Negative for chest pain, palpitations, orthopnea, claudication, leg swelling and PND.   Gastrointestinal:  Negative for abdominal pain, constipation, diarrhea, heartburn, nausea and vomiting.   Genitourinary:  Negative for dysuria and urgency.   Musculoskeletal:   "Negative for back pain, falls, joint pain, myalgias and neck pain.   Skin:  Negative for itching and rash.   Neurological:  Negative for dizziness, tremors, speech change, focal weakness, weakness and headaches.   Endo/Heme/Allergies:  Negative for environmental allergies.   Psychiatric/Behavioral:  Negative for depression.        /58 (BP Location: Right arm, Patient Position: Sitting, BP Cuff Size: Adult)   Pulse 77   Ht 1.6 m (5' 3\")   Wt 76.2 kg (168 lb)   SpO2 93%   Physical Exam  Constitutional:       General: She is not in acute distress.     Appearance: Normal appearance. She is well-developed and normal weight.   HENT:      Head: Normocephalic and atraumatic.      Right Ear: External ear normal.      Left Ear: External ear normal.      Nose: Nose normal. No congestion.      Mouth/Throat:      Mouth: Mucous membranes are moist.      Pharynx: Oropharynx is clear. No oropharyngeal exudate.   Eyes:      General: No scleral icterus.     Extraocular Movements: Extraocular movements intact.      Conjunctiva/sclera: Conjunctivae normal.      Pupils: Pupils are equal, round, and reactive to light.   Neck:      Vascular: No JVD.      Trachea: No tracheal deviation.   Cardiovascular:      Rate and Rhythm: Normal rate and regular rhythm.      Heart sounds: Normal heart sounds. No murmur heard.     No friction rub. No gallop.   Pulmonary:      Effort: Pulmonary effort is normal. No accessory muscle usage or respiratory distress.      Breath sounds: Normal breath sounds. No wheezing or rales.   Abdominal:      General: There is no distension.      Palpations: Abdomen is soft.      Tenderness: There is no abdominal tenderness.   Musculoskeletal:         General: No tenderness or deformity. Normal range of motion.      Cervical back: Normal range of motion and neck supple.      Right lower leg: No edema.      Left lower leg: No edema.   Lymphadenopathy:      Cervical: No cervical adenopathy.   Skin:     General: " Skin is warm and dry.      Findings: No rash.      Nails: There is no clubbing.   Neurological:      Mental Status: She is alert and oriented to person, place, and time.      Cranial Nerves: No cranial nerve deficit.      Gait: Gait normal.   Psychiatric:         Behavior: Behavior normal.         PFTs as reviewed by me personally: as per HPI    Imaging as reviewed by me personally:  as per hPI    Assessment:  1. Respiratory failure with hypoxia, unspecified chronicity ([facility])  Spirometry      2. Mild intermittent asthma without complication        3. Other nonspecific abnormal finding of lung field  Spirometry          Plan:  I do not think she had a pneumonia but would like to obtain records and imaging from [facility]. Lungs are clear with normal cardiac exam today and no e/o volume overload. Normal SpO2. Update spirometry at next visit.  Chronic, mild. Continue prn LAWANDA  ? Nodular opacity on CXR. Once I review CXRs from [facility], if indicated, we can f/u with CT chest.  Return in about 4 months (around 10/1/2023) for spirometry same day as follow up.

## 2023-06-20 ENCOUNTER — OFFICE VISIT (OUTPATIENT)
Dept: MEDICAL GROUP | Facility: PHYSICIAN GROUP | Age: 88
End: 2023-06-20
Payer: MEDICARE

## 2023-06-20 VITALS
OXYGEN SATURATION: 94 % | RESPIRATION RATE: 16 BRPM | HEIGHT: 63 IN | TEMPERATURE: 97.7 F | DIASTOLIC BLOOD PRESSURE: 64 MMHG | HEART RATE: 64 BPM | BODY MASS INDEX: 30.12 KG/M2 | SYSTOLIC BLOOD PRESSURE: 110 MMHG | WEIGHT: 170 LBS

## 2023-06-20 DIAGNOSIS — H40.9 GLAUCOMA OF BOTH EYES, UNSPECIFIED GLAUCOMA TYPE: ICD-10-CM

## 2023-06-20 DIAGNOSIS — N18.32 STAGE 3B CHRONIC KIDNEY DISEASE: ICD-10-CM

## 2023-06-20 DIAGNOSIS — E11.9 DIABETES MELLITUS TYPE 2 IN NONOBESE (HCC): ICD-10-CM

## 2023-06-20 DIAGNOSIS — I35.1 AORTIC VALVE INSUFFICIENCY, ETIOLOGY OF CARDIAC VALVE DISEASE UNSPECIFIED: ICD-10-CM

## 2023-06-20 DIAGNOSIS — M25.532 LEFT WRIST PAIN: ICD-10-CM

## 2023-06-20 PROBLEM — J96.11 CHRONIC RESPIRATORY FAILURE WITH HYPOXIA (HCC): Status: RESOLVED | Noted: 2022-05-09 | Resolved: 2023-06-20

## 2023-06-20 PROCEDURE — 99214 OFFICE O/P EST MOD 30 MIN: CPT | Performed by: FAMILY MEDICINE

## 2023-06-20 PROCEDURE — 3074F SYST BP LT 130 MM HG: CPT | Performed by: FAMILY MEDICINE

## 2023-06-20 PROCEDURE — 3078F DIAST BP <80 MM HG: CPT | Performed by: FAMILY MEDICINE

## 2023-06-20 ASSESSMENT — FIBROSIS 4 INDEX: FIB4 SCORE: 1.71

## 2023-06-20 NOTE — PROGRESS NOTES
Subjective:   Amada Xavier is a 89 y.o. female here today for evaluation and management of:     Left wrist pain  Recovered well after right wrist fracture. She is right handed.       Aortic regurgitation  Has follow up echo recommended by her cardiologist and pulmonologist as she had episode of respiratory failure with oxygen down to 70s earlier this year, not due to pneumonia or DVT  Last echo 2022 showed some mild valvular insufficiency.   She is not needing supplemental oxygen, if oxygen drops to 89, taking deep breaths brings it up to 90s.       Glaucoma  Chronic condition, worsening vision, cannot drive now  She is closely followed by ophthalmology [facility]. Has injections done for macular edema, fibrosis.   Eylea injections.          Home health referral placed to help with medications as her eye sight is worsening.   She said her brother can be trusted to help her with her finances and balancing her checkbook.         Current medicines (including changes today)  Current Outpatient Medications   Medication Sig Dispense Refill    levothyroxine (SYNTHROID) 75 MCG Tab Take 1 Tablet by mouth every morning on an empty stomach. 90 Tablet 3    Blood Glucose Test Strips Test strips order: Test strips for insurance to approve meter. Sig: Use 1 test strip daily to check blood sugars one to three times a day#300 RF x 3 300 Strip 3    Blood Glucose Monitoring Suppl Device Meter: Dispense Device of Insurance Preference. Sig. Use as directed for blood sugar monitoring. #1. NR. 1 Each 0    Lancet Device Misc Check blood sugars fasting and after meals daily #300 with 3 refills 300 Each 3    montelukast (SINGULAIR) 10 MG Tab Take 1 Tablet by mouth every day. 90 Tablet 3    dilTIAZem CD (CARDIZEM CD) 360 MG CAPSULE SR 24 HR Take 1 Capsule by mouth every day.      metFORMIN ER (GLUCOPHAGE XR) 500 MG TABLET SR 24 HR Take 1 Tablet by mouth 2 times a day. 180 Tablet 3    Lancets Lancets order: Lancets for insurance  approved meter. Sig: Use 1 lancet once daily to check blood sugar #100 RF x 3 100 Each 3    metronidazole (NORITATE) 1 % cream apply by topical route  every day a thin layer to the affected area(s)      losartan (COZAAR) 100 MG Tab Take 1 Tablet by mouth every day.      fenofibrate (TRIGLIDE) 160 MG tablet Take 1 Tablet by mouth every day.      fluocinolone (SYNALAR) 0.025 % cream Apply 1 gram topically once a day to affected skin 60 g 3    ketoconazole (NIZORAL) 2 % Cream Use one gram on affected skin once a day 60 g 3    Aflibercept (EYLEA) 2 MG/0.05ML Solution inject 0.05 milliliter by intravitreal route  every 4 weeks for 5 doses then every 8 weeks      albuterol 108 (90 Base) MCG/ACT Aero Soln inhalation aerosol Inhale 2 Puffs every day.      aspirin 81 MG EC tablet Take 1 Tablet by mouth every day.      azelastine (ASTELIN) 137 MCG/SPRAY nasal spray Administer 1 Spray into affected nostril(S) 2 times a day.      Calcium Citrate-Vitamin D 200-125 MG-UNIT Tab       vitamin D (VITAMIND D3) 1000 UNIT Tab Take 1 Tablet by mouth.      escitalopram (LEXAPRO) 10 MG Tab Take 1 Tablet by mouth every day.      fluticasone (FLONASE) 50 MCG/ACT nasal spray Administer 1 Spray into affected nostril(S) every day.      Probiotic Product (ALIGN) Cap Take 1 Capsule by mouth every day.      rosuvastatin (CRESTOR) 10 MG Tab       Clobetasol Prop-Niacinamide 0.05-4 % Ointment Apply  topically.      fexofenadine (ALLEGRA) 180 MG tablet Take 1 Tablet by mouth every day.      Multiple Vitamins-Minerals (PRESERVISION AREDS 2 PO) Take  by mouth.       No current facility-administered medications for this visit.     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    ROS  No chest pain, no shortness of breath, no abdominal pain       Objective:     /64   Pulse 64   Temp 36.5 °C (97.7 °F) (Temporal)   Resp 16   Ht 1.6 m (5' 3\")   Wt 77.1 kg (170 lb)   SpO2 94%  Body mass index is 30.11 kg/m².   Physical Exam:  Constitutional: Alert, no distress.  Skin: Warm, dry, good turgor, no rashes in visible areas.  Eye: Equal, round and reactive, conjunctiva clear, lids normal.  ENMT: Lips without lesions, good dentition, oropharynx clear.  Neck: Trachea midline, no masses, no thyromegaly. No cervical or supraclavicular lymphadenopathy  Respiratory: Unlabored respiratory effort, lungs clear to auscultation, no wheezes, no ronchi.  Cardiovascular: Normal S1, S2, no murmur, no edema.  Abdomen: Soft, non-tender, no masses, no hepatosplenomegaly.  Psych: Alert and oriented x3, normal affect and mood.        Assessment and Plan:   The following treatment plan was discussed    1. Left wrist pain    2. Aortic valve insufficiency, etiology of cardiac valve disease unspecified  - Referral to Home Health    3. Glaucoma of both eyes, unspecified glaucoma type  - Referral to Home Health    4. Stage 3b chronic kidney disease (HCC)  - Comp Metabolic Panel; Future    5. Diabetes mellitus type 2 in nonobese (HCC)  - HEMOGLOBIN A1C; Future      Followup: No follow-ups on file.           "

## 2023-06-20 NOTE — ASSESSMENT & PLAN NOTE
Has follow up echo recommended by her cardiologist and pulmonologist as she had episode of respiratory failure with oxygen down to 70s earlier this year, not due to pneumonia or DVT  Last echo 2022 showed some mild valvular insufficiency.   She is not needing supplemental oxygen, if oxygen drops to 89, taking deep breaths brings it up to 90s.

## 2023-06-20 NOTE — ASSESSMENT & PLAN NOTE
Chronic condition, worsening vision, cannot drive now  She is closely followed by ophthalmology [facility]. Has injections done for macular edema, fibrosis.   Eylea injections.

## 2023-06-29 ENCOUNTER — TELEPHONE (OUTPATIENT)
Dept: MEDICAL GROUP | Facility: PHYSICIAN GROUP | Age: 88
End: 2023-06-29
Payer: MEDICARE

## 2023-08-30 DIAGNOSIS — J45.20 MILD INTERMITTENT ASTHMA WITHOUT COMPLICATION: ICD-10-CM

## 2023-08-30 RX ORDER — ALBUTEROL SULFATE 90 UG/1
2 AEROSOL, METERED RESPIRATORY (INHALATION) DAILY
Qty: 8.5 G | Refills: 11 | Status: SHIPPED | OUTPATIENT
Start: 2023-08-30

## 2023-08-30 NOTE — TELEPHONE ENCOUNTER
90 DAY SUPPLY      Have we ever prescribed this med? Yes.      Last OV: 6/1/23    Next OV: 11/9/23      Medications: albuterol 108 (90 Base) MCG/ACT Aero Soln inhalation aerosol       90 DAY SUPPLY

## 2023-09-05 RX ORDER — ESCITALOPRAM OXALATE 10 MG/1
10 TABLET ORAL
Qty: 30 TABLET | Refills: 0 | Status: SHIPPED | OUTPATIENT
Start: 2023-09-05

## 2023-09-05 RX ORDER — LEVOTHYROXINE SODIUM 0.07 MG/1
75 TABLET ORAL
Qty: 30 TABLET | Refills: 0 | Status: SHIPPED | OUTPATIENT
Start: 2023-09-05

## 2023-09-05 RX ORDER — LEVOTHYROXINE SODIUM 0.07 MG/1
75 TABLET ORAL
Qty: 90 TABLET | Refills: 3 | Status: SHIPPED | OUTPATIENT
Start: 2023-09-05 | End: 2023-09-05 | Stop reason: SDUPTHER

## 2023-09-05 RX ORDER — ESCITALOPRAM OXALATE 10 MG/1
10 TABLET ORAL
Qty: 90 TABLET | Refills: 3 | Status: SHIPPED | OUTPATIENT
Start: 2023-09-05 | End: 2023-09-05 | Stop reason: SDUPTHER

## 2023-10-10 ENCOUNTER — APPOINTMENT (OUTPATIENT)
Dept: SLEEP MEDICINE | Facility: MEDICAL CENTER | Age: 88
End: 2023-10-10
Attending: NURSE PRACTITIONER
Payer: MEDICARE

## 2023-10-10 ENCOUNTER — APPOINTMENT (OUTPATIENT)
Dept: SLEEP MEDICINE | Facility: MEDICAL CENTER | Age: 88
End: 2023-10-10
Attending: INTERNAL MEDICINE
Payer: MEDICARE

## 2023-10-13 ENCOUNTER — TELEPHONE (OUTPATIENT)
Dept: HEALTH INFORMATION MANAGEMENT | Facility: OTHER | Age: 88
End: 2023-10-13
Payer: MEDICARE

## 2023-11-01 ENCOUNTER — NON-PROVIDER VISIT (OUTPATIENT)
Dept: SLEEP MEDICINE | Facility: MEDICAL CENTER | Age: 88
End: 2023-11-01
Attending: INTERNAL MEDICINE
Payer: MEDICARE

## 2023-11-01 DIAGNOSIS — J96.91 RESPIRATORY FAILURE WITH HYPOXIA, UNSPECIFIED CHRONICITY (HCC): ICD-10-CM

## 2023-11-01 DIAGNOSIS — R91.8 OTHER NONSPECIFIC ABNORMAL FINDING OF LUNG FIELD: ICD-10-CM

## 2023-11-01 PROCEDURE — 94060 EVALUATION OF WHEEZING: CPT | Mod: 26 | Performed by: INTERNAL MEDICINE

## 2023-11-01 PROCEDURE — 94060 EVALUATION OF WHEEZING: CPT | Performed by: INTERNAL MEDICINE

## 2023-11-01 ASSESSMENT — PULMONARY FUNCTION TESTS
FEV1_PREDICTED: 1.59
FVC_PERCENT_PREDICTED: 79
FVC: 1.69
FEV1_PERCENT_CHANGE: 7
FEV1/FVC_PERCENT_CHANGE: 175
FEV1: 1.54
FEV1/FVC_PERCENT_PREDICTED: 113
FEV1_PERCENT_CHANGE: 4
FEV1/FVC_PERCENT_PREDICTED: 117
FEV1_PREDICTED: 89
FVC_PERCENT_PREDICTED: 82
FEV1_PERCENT_PREDICTED: 96
FVC_PREDICTED: 2.14
FEV1/FVC: 85
FVC: 1.77
FEV1/FVC_PREDICTED: 74.3
FEV1: 1.43
FEV1/FVC: 87.01

## 2023-11-01 NOTE — PROCEDURES
RE: Plan of Care    Dear Dr. Christopher Mcclure MD    Thank you for referring Ashley A Rohith. The following information reflects my assessment and plan of care.           Plan of Care 22   Effective from: 2022  Effective to: 2023    Active  Plan ID: [id number]           Participants     Name Type Comments Contact Info    Christopher Mcclure MD Referring Provider  [phone]    Mal Lentz, PT Physical Therapist             Ashley Newberry MRN:[id number] (:1966 56 year old F)            Evaluation     Author: Mal Lentz, PT Status: Signed Last edited: 2022  2:45 PM       Physical Therapy Evaluation    Visit Type: Initial Evaluation  Visit: 1  Referring Provider: Christopher Mcclure MD  Medical Diagnosis (from order): Diagnosis Information    Diagnosis  V43.65 (ICD-9-CM) - Z96.651 (ICD-10-CM) - Status post revision of total knee replacement, right       Treatment Diagnosis: right knee with increased pain/symptoms, impaired range of motion, impaired muscle length/flexibility, impaired mobility, impaired balance, impaired motor function/performance/coordination, impaired body mechanics, impaired activity tolerance, impaired gait and impaired strength.  Onset  - Date of surgery: 10/10/2022  - Procedure: Right TKA revision  Chart reviewed at time of initial evaluation (relevant co-morbidities, allergies, tests and medications listed):   Past Medical History:  No date: Anxiety  2014: Anxiety and depression  No date: Bacterial infection  10/12/15: Bartholin's gland abscess      Comment:  Miller catheter placement  No date: Bronchitis  2018: Celiac disease  No date: Cervical spondylosis with radiculopathy  No date: Chronic pain  No date: Common migraine  No date: COPD (chronic obstructive pulmonary disease) (CMS/[facility])  No date: Depression  No date: Dysmenorrhea  No date: Gastroesophageal reflux disease  No date: High cholesterol  No date: Insomnia  No date: Malignant neoplasm (CMS/[facility])  No  Good patient effort & cooperation. The results of this test meet the ATS standards for acceptability and repeatability. Test was performed on the [employer]ucloudD spirometry system. Predicted values were GLI-2012. A bronchodilator of Ventolin HFA- 2 puffs with a spacer was administered to the patient.    Interpretation;  Baseline spirometry shows mild reduction in FVC at 1.69 L or 79% predicted with a normal FEV1 of 1.43 L or 89% predicted and an FEV1/FVC ratio that is borderline restrictive of 84.  There is trend toward bronchodilator response but does not meet ATS criteria.  Spirometry trends towards restriction with trend toward bronchodilator responsiveness.  Suggest full PFTs to better clarify abnormality.   date: Menorrhagia  No date: Osteoporosis  No date: Otitis media  No date: Pneumonia  No date: PONV (postoperative nausea and vomiting)  No date: Sinusitis, chronic  No date: Tobacco use disorder    Past Surgical History:  08/08/2012: Bunionectomy  12/06/2016: Cervical fusion      Comment:  Anterior Cervical Fusion C5-6 - Dr Holbrook  09/26/2017: Colonoscopy      Comment:  repeat colonoscopy in 10 years per Dr Lozada due Sept 2027  1995: D and c  11/09/2018: Esophagogastroduodenoscopy  11/09/2018: Gi endoscopic ultrasound  02/01/2012: Hysterectomy      Comment:  TVH; has ovaries  No date: Joint replacement  06/2014: Knee scope,diagnostic; Right      Comment:  torn meniscus   07/12/2017: Knee surgery; Left  02/11/2019: Laparoscopic cholecystectomy      Comment:  Dr Eliecer Youngblood MD   No date: Pain clinic      Comment:  r knee  No date: Removal gallbladder  02/09/2010: Shoulder surgery; Bilateral      Comment:  3 Left & 1 Right  No date: Skin biopsy  11/24/2021: Total knee replacement; Right  age 17: Sarah tooth extraction    Current Outpatient Medications:  acetaminophen (TYLENOL) 500 MG tablet, Take 1,000 mg by mouth every 8 hours.  gabapentin (NEURONTIN) 300 MG capsule, Take 300 mg by mouth nightly.  meloxicam (MOBIC) 15 MG tablet, Take 15 mg by mouth as needed for Pain.  oxyCODONE HCl 10 MG immediate release tablet, Take 10 mg by mouth every 4 hours as needed (severe pain).  mirtazapine (REMERON) 7.5 MG tablet, Take 7.5 mg by mouth at bedtime.  citalopram (CeleXA) 40 MG tablet, Take 1 tablet by mouth nightly.  alendronate (FOSAMAX) 70 MG tablet, TAKE 1 TABLET BY MOUTH 1 DAY A WEEK.  Claravis 40 MG capsule, Take 40 mg by mouth 2 times daily.  omeprazole (PrilOSEC) 20 MG capsule, Take 20 mg by mouth daily.  tiotropium-olodaterol (STIOLTO RESPIMAT) 2.5-2.5 MCG/ACT inhaler, Inhale 2 puffs into the lungs daily.  aspirin 81 MG EC tablet, Take 1 tablet by mouth 2 times daily.  HYDROcodone-acetaminophen (NORCO)   MG per tablet, Take 1 tablet by mouth daily as needed for Pain. (Patient not taking: Reported on 10/20/2022)  ipratropium-albuterol (DUONEB) 0.5-2.5 (3) MG/3ML nebulizer solution, Take 3 mLs by nebulization every 6 hours as needed for Wheezing.  albuterol 108 (90 Base) MCG/ACT inhaler, Inhale 2 puffs into the lungs every 4 hours as needed for Shortness of Breath, Wheezing or Other (cough).  Calcium Carbonate (CALCIUM-CARB 600 PO), Take 1,200 mg by mouth daily.  cholecalciferol (cholecalciferol) 25 mcg (1,000 units) tablet, Take 4 tablets by mouth daily.  valACYclovir (VALTREX) 500 MG tablet, Take 500 mg by mouth nightly.   metaxalone (SKELAXIN) 800 MG tablet, Take 800 mg by mouth 3 times daily as needed.     No current facility-administered medications for this encounter.          SUBJECTIVE                                                                                                               Patient is about 7 weeks post-op right total knee arthroplasty revision. Original right TKA was 1 year ago. States she feels she is a little behind. States she got a lung infection about a week after surgery and then was in the hospital for a week. Had home health after getting out of the hospital up until about 1.5 weeks ago. Has progressed to ambulating without assistive device. Sees a pain doctor for her neck and shoulders (multiple surgeries) and has been taking Hydrocodone and morphine for those pains for a few years now. Will occasionally take Tylenol for her right knee. Continues to ice several times per day. States knee does swell quite a bit. Is currently doing SLR, quad sets, knee ROM standing and sitting. States she is doing the exercises 3-4 times per day.     Is currently off of work. Works for [employer]. Job entails desk work and standing.     Likes to walk with her dog.      Pain / Symptoms  - Pain rating (out of 10): Current: 3 ; Best: 0; Worst: 7  - Location:  Right medial knee and quadriceps tendon  - Quality / Description: ache, throbbing  - Alleviating Factors: ice, prescription medications, over-the-counter medication  - Progression since onset: improved    Function:   Limitations / Exacerbation Factors:   - Patient reports pain, difficulty and increased time with function reported below.  - bed mobility, sleep disturbed, lower body dressing, grooming/hygiene/self-care activities, driving/riding in a vehicle, grocery shopping, work - unable at this time, standing tasks, house/yard work, bending/squatting/lifting, walking, sitting, standing, kneeling and lifting/carrying, community distances, stairs  Prior Level of Function: worsening pain and function, therefore underwent surgery,    Patient Goals: decreased pain, improved balance, increased motion, increased strength, return to sport/leisure activities and return to work.    Prior treatment  - home PT  - Discharged from hospital, home health, or skilled nursing facility in last 30 days: yes  Home Environment   - Patient lives with: alone  - Type of home: apartment  - Assistance available: as needed  - Denies 2 or more falls or an unexplained fall with injury in the last year.  - Feel safe at home / work / school: yes      OBJECTIVE                                                                                                                    Hand Dominance: right-handed    Incision/Wound:   - Location: Right anterior knee - closed, well healed, no signs of infection       Range of Motion (ROM)   (degrees unless noted; active unless noted; norms in ( ); negative=lacking to 0, positive=beyond 0)  Knee:   - Flexion (150):      • Left:  121       • Right:  97   Passive: 113   - Extension (0-10):      • Left:  4       • Right:  -15   Passive: 0    Strength  (out of 5 unless noted, standard test position unless noted)   Knee:    - Flexion:        • Left: 5        • Right: 4-    - Extension:        • Left: 5        •  Right: 4-             Standing Balance  (NATE = base of support)  Firm Surface: Single Leg     - Left, Eyes Open (sec): 8     - Right, Eyes Open (sec): 3       Ambulation / Gait  - Assistive device: no assistive device  - Description: antalgic, decreased waleska/pace and decreased stance time RLE      Functional Testing  Double Leg Squat  - Left: body weight shift and knee valgus  - Right: knee valgus  Increased weight bearing left lower extremity       Outcome/Assessments  Outcome Measures:   Lower Extremity Functional Scale: LEFS Calculated Total: 44 (0=extreme difficulty; 80=no difficulty) see flowsheet for additional documentation        Treatment     Therapeutic Exercise  Supine knee extension stretch with manual overpressure, 3x30 second holds  Quad set, 5x5 second holds  straight leg-raise, x10 (mild quad lag)  SAQ, 10x5 second holds  Standing TKE with ball on wall, 10x5 second holds  Partial squats with B UE support, 2x10 (mirror for visual feedback, verbal cues for equal weight bearing)      Skilled input: verbal instruction/cues, tactile instruction/cues and posture correction  education/instruction on: Evaluation findings, relevant anatomy, plan of care, home exercise program    Writer verbally educated and received verbal consent for hand placement, positioning of patient, and techniques to be performed today from patient for therapist position for techniques and hand placement and palpation for techniques as described above and how they are pertinent to the patient's plan of care.    Home Exercise Program  Access Code: [email or web address]  URL: [email or web address]  Date: 12/01/2022  Prepared by: Mal Lentz    Exercises  · Standing Terminal Knee Extension with Resistance - 1 x daily - 7 x weekly - 3 sets - 10 reps - 5 hold  · Mini Squat with Counter Support - 1 x daily - 7 x weekly - 3 sets - 10 reps  · Seated Passive Knee Extension with Weight - 3 x daily - 7 x weekly - 1 reps - 5 min  hold  · Standing Gastroc Stretch on Step with Counter Support - 3 x daily - 7 x weekly - 3 reps - 30-45 sec hold               ASSESSMENT                                                                                                          56 year old patient has reported functional limitations listed above impacted by signs and symptoms consistent with treatment diagnosis below.  Treatment Diagnosis:   - Involved: right knee.  - Symptoms/impairments: increased pain/symptoms, impaired range of motion, impaired muscle length/flexibility, impaired mobility, impaired balance, impaired motor function/performance/coordination, impaired body mechanics, impaired activity tolerance, impaired gait and impaired strength.    Patient presents to PT for evaluation nearing 8 weeks post-op right total knee arthroplasty revision on 10/10/22. Patient is ambulating without device, mildly antalgic with decreased knee extension on right. Active range of motion is limited compared to passive, greatest with extension. Patient demonstrates manual muscle test weakness of right knee with noted carryover into functional mobility deficits in sit<> stand. Patient did well with initial treatment today without any reported increase in knee pain and, with cueing, was able to perform partial squats with equal weight bearing and ambulate with minimal antalgia.   Pain/symptoms after session (out of 10): 3    Prognosis: Patient will benefit from skilled therapy.  Rehabilitative potential is: good.  Predicted patient presentation: Low (stable) - Patient comorbidities and complexities, as defined above, will have little effect on progress for prescribed plan of care.    Education:   - Present and ready to learn: patient    PLAN                                                                                                                         The following skilled interventions to be implemented to achieve goals listed below:  Neuromuscular  Re-Education (74620)  Therapeutic Activity (52157)  Therapeutic Exercise (87783)  Manual Therapy (03502)  Vasopneumatic Device (56123)  Gait Training (42321)    Frequency / Duration  2 times per week tapering as patient progresses for 8 weeks for an estimated total of 16 visits    Patient involved in and agreed to plan of care and goals.    Suggestions for next session as indicated: Progress per plan of care, terminal knee extension, knee range of motion, double leg closed chain strengthening, progress home exercise program.       Goals  Improve involved strength to 5/5  Improve right knee PROM to 0-120 degrees  The above improvements in impairments to assist in obtaining goals listed below  Long Term Goals: to be met by end of plan of care  1. Patient will ascend and descend 10 steps without device and reciprocal gait pattern in order to navigate variable community environments without difficulty.   2. Patient will demonstrate ability to negotiate level and unlevel surfaces at variable velocities, including change of direction without increased pain or instability to return to age appropriate and community activities at prior level of function.  3. Patient will squat/transfer sit to stand repeatedly, with minimal reported difficulty, for completion of household tasks such as rising from a chair safely, getting out of bed, and getting in and out of a vehicle safely and efficiently.  4. Patient will stand for 60 minutes with reported manageable/tolerable difficulty for house tasks such as cleaning, meal-preparation, and yard work chores.  5. Lower Extremity Functional Scale: Patient will complete form to reflect an improved raw score to greater than or equal to 64/80 to indicate patient reported improvement in function/disability/impairment (minimal detectable change: 9 points).  6. Patient will demonstrate ability to transition from stand<>sit through half kneeling with use of one upper extremity in order to get up  and down from the floor of her home independently.       Therapy procedure time and total treatment time can be found documented on the Time Entry flowsheet               Please complete the attached form to indicate your approval of the plan of care upon receipt and fax signed form to the fax number below.  Insurance compliance requires your approval be on file.  Should you have any questions, feel free to contact me.     Mal Lentz, PT  [facility]  [address]  [place] [address]  Phone: [phone]                RE: Plan of Care for Ashley Newberry, YOB: 1966     I certify the need for these services, furnished under this plan of treatment and while under my care.  I agree with the plan of care as stated and request that therapy proceed.        __________________________________________________________________________________  MD Signature         Date   Time

## 2023-11-14 ENCOUNTER — OFFICE VISIT (OUTPATIENT)
Dept: MEDICAL GROUP | Facility: PHYSICIAN GROUP | Age: 88
End: 2023-11-14
Payer: MEDICARE

## 2023-11-14 VITALS
WEIGHT: 162 LBS | SYSTOLIC BLOOD PRESSURE: 122 MMHG | OXYGEN SATURATION: 94 % | HEART RATE: 72 BPM | HEIGHT: 63 IN | DIASTOLIC BLOOD PRESSURE: 62 MMHG | TEMPERATURE: 98.1 F | RESPIRATION RATE: 16 BRPM | BODY MASS INDEX: 28.7 KG/M2

## 2023-11-14 DIAGNOSIS — R00.1 BRADYCARDIA: ICD-10-CM

## 2023-11-14 DIAGNOSIS — I65.29 CAROTID ATHEROSCLEROSIS, UNSPECIFIED LATERALITY: ICD-10-CM

## 2023-11-14 DIAGNOSIS — N39.3 STRESS INCONTINENCE: ICD-10-CM

## 2023-11-14 DIAGNOSIS — R21 RASH OF BACK: ICD-10-CM

## 2023-11-14 DIAGNOSIS — R06.02 SHORTNESS OF BREATH: ICD-10-CM

## 2023-11-14 DIAGNOSIS — Z23 NEED FOR VACCINATION: ICD-10-CM

## 2023-11-14 DIAGNOSIS — K58.1 IRRITABLE BOWEL SYNDROME WITH CONSTIPATION: ICD-10-CM

## 2023-11-14 DIAGNOSIS — Z91.81 RISK FOR FALLS: ICD-10-CM

## 2023-11-14 DIAGNOSIS — Q24.5 ANOMALOUS ORIGIN OF CORONARY ARTERY: ICD-10-CM

## 2023-11-14 DIAGNOSIS — H35.30 AMD (AGE-RELATED MACULAR DEGENERATION), BILATERAL: ICD-10-CM

## 2023-11-14 DIAGNOSIS — H49.20 ABDUCENS NERVE PALSY, UNSPECIFIED LATERALITY: ICD-10-CM

## 2023-11-14 DIAGNOSIS — E03.9 HYPOTHYROIDISM, UNSPECIFIED TYPE: ICD-10-CM

## 2023-11-14 DIAGNOSIS — E11.9 DIABETES MELLITUS TYPE 2 IN NONOBESE (HCC): ICD-10-CM

## 2023-11-14 DIAGNOSIS — K21.9 GASTROESOPHAGEAL REFLUX DISEASE WITHOUT ESOPHAGITIS: ICD-10-CM

## 2023-11-14 DIAGNOSIS — I35.1 AORTIC VALVE INSUFFICIENCY, ETIOLOGY OF CARDIAC VALVE DISEASE UNSPECIFIED: ICD-10-CM

## 2023-11-14 DIAGNOSIS — Z92.89 HISTORY OF MAMMOGRAM: ICD-10-CM

## 2023-11-14 DIAGNOSIS — I10 PRIMARY HYPERTENSION: ICD-10-CM

## 2023-11-14 DIAGNOSIS — I71.21 ANEURYSM OF ASCENDING AORTA WITHOUT RUPTURE (HCC): ICD-10-CM

## 2023-11-14 DIAGNOSIS — N18.31 STAGE 3A CHRONIC KIDNEY DISEASE: ICD-10-CM

## 2023-11-14 DIAGNOSIS — F32.89 OTHER DEPRESSION: ICD-10-CM

## 2023-11-14 DIAGNOSIS — H40.9 GLAUCOMA OF BOTH EYES, UNSPECIFIED GLAUCOMA TYPE: ICD-10-CM

## 2023-11-14 DIAGNOSIS — Z85.3 HISTORY OF BREAST CANCER: ICD-10-CM

## 2023-11-14 DIAGNOSIS — M25.532 LEFT WRIST PAIN: ICD-10-CM

## 2023-11-14 DIAGNOSIS — M25.531 RIGHT WRIST PAIN: ICD-10-CM
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
## 2023-11-14 PROCEDURE — 3074F SYST BP LT 130 MM HG: CPT | Performed by: FAMILY MEDICINE

## 2023-11-14 PROCEDURE — G0008 ADMIN INFLUENZA VIRUS VAC: HCPCS | Performed by: FAMILY MEDICINE

## 2023-11-14 PROCEDURE — 90662 IIV NO PRSV INCREASED AG IM: CPT | Performed by: FAMILY MEDICINE

## 2023-11-14 PROCEDURE — G0438 PPPS, INITIAL VISIT: HCPCS | Mod: 25 | Performed by: FAMILY MEDICINE

## 2023-11-14 PROCEDURE — 3078F DIAST BP <80 MM HG: CPT | Performed by: FAMILY MEDICINE

## 2023-11-14 ASSESSMENT — ENCOUNTER SYMPTOMS: GENERAL WELL-BEING: GOOD

## 2023-11-14 ASSESSMENT — FIBROSIS 4 INDEX: FIB4 SCORE: 1.73

## 2023-11-14 ASSESSMENT — PATIENT HEALTH QUESTIONNAIRE - PHQ9: CLINICAL INTERPRETATION OF PHQ2 SCORE: 0

## 2023-11-14 ASSESSMENT — ACTIVITIES OF DAILY LIVING (ADL): BATHING_REQUIRES_ASSISTANCE: 0

## 2023-11-14 NOTE — PROGRESS NOTES
Chief Complaint   Patient presents with    Medicare Annual Wellness       HPI:  Amada Xavier is a 89 y.o. here for Medicare Annual Wellness Visit     Patient Active Problem List    Diagnosis Date Noted    Left wrist pain 10/12/2022    Rash of back 08/25/2022    Shortness of breath 07/28/2022    Bradycardia 07/28/2022    AMD (age-related macular degeneration), bilateral 06/30/2022    Stage 3a chronic kidney disease (HCC) 01/20/2022    History of breast cancer 01/20/2022    Risk for falls 01/20/2022    Right wrist pain 09/22/2021    DNR (do not resuscitate) 07/14/2021    DNI (do not intubate) 07/14/2021    Ascending aortic aneurysm (HCC) 07/14/2021    Irritable bowel syndrome with constipation 07/14/2021    Stress incontinence 07/14/2021    Carotid artery plaque 10/04/2012    Aortic regurgitation 10/04/2012    Hypothyroidism 08/08/2012    Hyperlipidemia 08/08/2012    Hypertension 08/08/2012    Depression 08/08/2012    Glaucoma 08/08/2012    ASTHMA 08/08/2012    Diabetes mellitus type 2 in nonobese (HCC) 08/08/2012    History of mammogram 08/08/2012    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 30 Tablet 0    escitalopram (LEXAPRO) 10 MG Tab Take 1 Tablet by mouth every day. 30 Tablet 0    albuterol 108 (90 Base) MCG/ACT Aero Soln inhalation aerosol Inhale 2 Puffs every day. 8.5 g 11    Blood Glucose Test Strips Test strips order: Test strips for insurance to approve meter. Sig: Use 1 test strip daily to check blood sugars one to three times a day#300 RF x 3 300 Strip 3    Blood Glucose Monitoring Suppl Device Meter: Dispense Device of Insurance Preference. Sig. Use as directed for blood sugar monitoring. #1. NR. 1 Each 0    Lancet Device Misc Check blood sugars fasting and after  meals daily #300 with 3 refills 300 Each 3    montelukast (SINGULAIR) 10 MG Tab Take 1 Tablet by mouth every day. 90 Tablet 3    dilTIAZem CD (CARDIZEM CD) 360 MG CAPSULE SR 24 HR Take 1 Capsule by mouth every day.      metFORMIN ER (GLUCOPHAGE XR) 500 MG TABLET SR 24 HR Take 1 Tablet by mouth 2 times a day. 180 Tablet 3    Lancets Lancets order: Lancets for insurance approved meter. Sig: Use 1 lancet once daily to check blood sugar #100 RF x 3 100 Each 3    metronidazole (NORITATE) 1 % cream apply by topical route  every day a thin layer to the affected area(s)      losartan (COZAAR) 100 MG Tab Take 1 Tablet by mouth every day.      fenofibrate (TRIGLIDE) 160 MG tablet Take 1 Tablet by mouth every day.      fluocinolone (SYNALAR) 0.025 % cream Apply 1 gram topically once a day to affected skin 60 g 3    ketoconazole (NIZORAL) 2 % Cream Use one gram on affected skin once a day 60 g 3    Aflibercept (EYLEA) 2 MG/0.05ML Solution inject 0.05 milliliter by intravitreal route  every 4 weeks for 5 doses then every 8 weeks      aspirin 81 MG EC tablet Take 1 Tablet by mouth every day.      azelastine (ASTELIN) 137 MCG/SPRAY nasal spray Administer 1 Spray into affected nostril(S) 2 times a day.      Calcium Citrate-Vitamin D 200-125 MG-UNIT Tab       vitamin D (VITAMIND D3) 1000 UNIT Tab Take 1 Tablet by mouth.      fluticasone (FLONASE) 50 MCG/ACT nasal spray Administer 1 Spray into affected nostril(S) every day.      Probiotic Product (ALIGN) Cap Take 1 Capsule by mouth every day.      rosuvastatin (CRESTOR) 10 MG Tab       Clobetasol Prop-Niacinamide 0.05-4 % Ointment Apply  topically.      fexofenadine (ALLEGRA) 180 MG tablet Take 1 Tablet by mouth every day.      Multiple Vitamins-Minerals (PRESERVISION AREDS 2 PO) Take  by mouth.       No current facility-administered medications for this visit.          Current supplements as per medication list.     Allergies: Atorvastatin, Bacitracin, Clarithromycin, Linaclotide,  Pcn [penicillins], and Sulfa drugs    Current social contact/activities: Confucianist     She  reports that she has never smoked. She has never used smokeless tobacco. She reports that she does not drink alcohol and does not use drugs.  Counseling given: Not Answered      ROS:    Gait: Uses a cane  Ostomy: No  Other tubes: No  Amputations: No  Chronic oxygen use: No  Last eye exam: 2023  Wears hearing aids: Yes   : Reports urinary leakage during the last 6 months that has interfered a lot with their daily activities or sleep.    Screening:    Depression Screening  Little interest or pleasure in doing things?  0 - not at all  Feeling down, depressed , or hopeless? 0 - not at all  Patient Health Questionnaire Score: 0     If depressive symptoms identified deferred to follow up visit unless specifically addressed in assessment and plan.    Interpretation of PHQ-9 Total Score   Score Severity   1-4 No Depression   5-9 Mild Depression   10-14 Moderate Depression   15-19 Moderately Severe Depression   20-27 Severe Depression    Screening for Cognitive Impairment  Do you or any of your friends or family members have any concern about your memory? Yes  Three Minute Recall (Banana, Sunrise, Chair) 3/3    Quinn clock face with all 12 numbers and set the hands to show 20 past 8.  Yes    Cognitive concerns identified deferred for follow up unless specifically addressed in assessment and plan.    Fall Risk Assessment  Has the patient had two or more falls in the last year or any fall with injury in the last year?  No    Safety Assessment  Do you always wear your seatbelt?  Yes  Any changes to home needed to function safely? No  Difficulty hearing.  Yes  Patient counseled about all safety risks that were identified.    Functional Assessment ADLs  Are there any barriers preventing you from cooking for yourself or meeting nutritional needs?  No.    Are there any barriers preventing you from driving safely or obtaining transportation?   Yes.    Are there any barriers preventing you from using a telephone or calling for help?  No    Are there any barriers preventing you from shopping?  No.    Are there any barriers preventing you from taking care of your own finances?  No    Are there any barriers preventing you from managing your medications?  No    Are there any barriers preventing you from showering, bathing or dressing yourself? No    Are there any barriers preventing you from doing housework or laundry? No  Are there any barriers preventing you from using the toilet?No  Are you currently engaging in any exercise or physical activity?  Yes.      Self-Assessment of Health  What is your perception of your health? Good  Do you sleep more than six hours a night? Yes  In the past 7 days, how much did pain keep you from doing your normal work? None  Do you spend quality time with family or friends (virtually or in person)? Yes  Do you usually eat a heart healthy diet that constists of a variety of fruits, vegetables, whole grains and fiber? Yes  Do you eat foods high in fat and/or Fast Food more than three times per week? No    Advance Care Planning  Do you have an Advance Directive, Living Will, Durable Power of , or POLST? Yes        POLST is not on file - instructed patient to bring in a copy to scan into their chart      Health Maintenance Summary            Overdue - Annual Wellness Visit (Every 366 Days) Order placed this encounter      No completion history exists for this topic.              Overdue - IMM DTaP/Tdap/Td Vaccine (1 - Tdap) Overdue - never done      No completion history exists for this topic.              Overdue - Zoster (Shingles) Vaccines (1 of 2) Overdue - never done      No completion history exists for this topic.              Overdue - Hepatitis B Vaccine (Hep B) (1 of 3 - Risk 3-dose series) Overdue - never done      No completion history exists for this topic.              Ordered - Fasting Lipid Profile  (Yearly) Ordered on 1/10/2023      10/18/2021  Lipid Profile    10/03/2012  LIPID PROFILE              Postponed - COVID-19 Vaccine (5 - Moderna series) Postponed until 6/1/2024 05/06/2022  Imm Admin: MODERNA SARS-COV-2 VACCINE (12+)    12/01/2021  Imm Admin: MODERNA SARS-COV-2 VACCINE (12+)    02/12/2021  Imm Admin: MODERNA SARS-COV-2 VACCINE (12+)    01/15/2021  Imm Admin: MODERNA SARS-COV-2 VACCINE (12+)              Diabetes: Monofilament / LE Exam (Yearly) Next due on 1/10/2024      01/10/2023  Diabetic Monofilament Lower Extremity Exam    01/10/2023  [employer]: WORKFLOW - DIABETES - DIABETIC FOOT EXAM PERFORMED    10/20/2021  Diabetic Monofilament LE Exam              Diabetes: Urine Protein Screening (Yearly) Next due on 2/22/2024 02/22/2023  MICROALBUMIN CREAT RATIO URINE    10/18/2021  Microalbumin Creat Ratio Urine - Lab Collect              Diabetes: Retinopathy Screening (Yearly) Next due on 2/23/2024 02/23/2023  Done - scanned into media              A1c Screening (Every 6 Months) Tentatively due on 4/20/2024      10/20/2023  HEMOGLOBIN A1C    02/22/2023  HEMOGLOBIN A1C    02/22/2023  HEMOGLOBIN A1C    06/30/2022  POCT A1C    10/18/2021  Hemoglobin A1c    Only the first 5 history entries have been loaded, but more history exists.              SERUM CREATININE (Yearly) Next due on 10/20/2024      10/20/2023  COMP METABOLIC PANEL    02/17/2022  Comp Metabolic Panel    01/20/2022  Renal Function Panel    10/18/2021  Comp Metabolic Panel    07/30/2021  CREATININE    Only the first 5 history entries have been loaded, but more history exists.              Pneumococcal Vaccine: 65+ Years (Series Information) Completed      02/23/2023  Imm Admin: Pneumococcal Conjugate Vaccine (PCV20)              Influenza Vaccine (Series Information) Completed      11/14/2023  Imm Admin: Influenza Vaccine Adult HD    09/16/2022  Imm Admin: Influenza Vaccine Adult HD    09/22/2021  Imm Admin: Influenza Vaccine  Adult HD    2020  Imm Admin: Influenza Vaccine Quad Inj (Pf)    10/24/2019  Imm Admin: Influenza Vaccine Quad Inj (Pf)    Only the first 5 history entries have been loaded, but more history exists.              Hepatitis A Vaccine (Hep A) (Series Information) Aged Out      No completion history exists for this topic.              HPV Vaccines (Series Information) Aged Out      No completion history exists for this topic.              Polio Vaccine (Inactivated Polio) (Series Information) Aged Out      No completion history exists for this topic.              Meningococcal Immunization (Series Information) Aged Out      No completion history exists for this topic.              Discontinued - Bone Density Scan  Discontinued      No completion history exists for this topic.                    Patient Care Team:  Remberto Kaufman M.D. as PCP - General (Family Medicine)  DAVID ISAAC as Respiratory Therapist (DME Supplier)        Social History     Tobacco Use    Smoking status: Never    Smokeless tobacco: Never   Vaping Use    Vaping Use: Never used   Substance Use Topics    Alcohol use: No    Drug use: No     Family History   Problem Relation Age of Onset    Heart Disease Mother          age 88, CAD    GI Disease Father         cirrhosis, CHF    Heart Disease Father     Heart Failure Father     Other Father      She  has a past medical history of 6th nerve palsy, Allergic rhinitis, Anomalous origin of coronary artery (2012), Aortic regurgitation (10/4/2012), ASTHMA, Breast cancer (HCC) (2012), Bronchitis, Cancer ([facility]), Carotid artery plaque (10/4/2012), Chickenpox, Depression (2012), Diabetes, Diabetes mellitus type 2 in nonobese (HCC) (2012), GERD (gastroesophageal reflux disease), Amharic measles, Glaucoma, History of colonoscopy (2012), History of mammogram (2012), Hyperlipidemia (2012), Hypertension (2012), Hypothyroidism (2012), Influenza, Mumps, Nasal drainage,  "Obesity, Rosacea, Thyroid disease, and Whooping cough.   Past Surgical History:   Procedure Laterality Date    CATARACT EXTRACTION WITH IOL      EYE SURGERY      LUMPECTOMY      MASTECTOMY      left    MASTECTOMY      FL REMV 2ND CATARACT,CORN-SCLER SECTN         Exam:   /62   Pulse 72   Temp 36.7 °C (98.1 °F) (Temporal)   Resp 16   Ht 1.6 m (5' 3\")   Wt 73.5 kg (162 lb)   SpO2 94%  Body mass index is 28.7 kg/m².    Hearing poor.    Dentition good  Alert, oriented in no acute distress.  Eye contact is good, speech goal directed, affect calm    Assessment and Plan. The following treatment and monitoring plan is recommended:    1. Need for vaccination  - Influenza Vaccine, High Dose (65+ Only)    2. Hypothyroidism, unspecified type    3. Gastroesophageal reflux disease without esophagitis    4. Primary hypertension    5. Other depression    6. Glaucoma of both eyes, unspecified glaucoma type    7. Abducens nerve palsy, unspecified laterality    8. Diabetes mellitus type 2 in nonobese (HCC)    9. History of mammogram    10. Anomalous origin of coronary artery    11. Carotid atherosclerosis, unspecified laterality    12. Aortic valve insufficiency, etiology of cardiac valve disease unspecified    13. Aneurysm of ascending aorta without rupture (HCC)    14. Irritable bowel syndrome with constipation    15. Stress incontinence    16. Right wrist pain    17. Stage 3a chronic kidney disease (HCC)    18. History of breast cancer    19. Risk for falls    20. AMD (age-related macular degeneration), bilateral    21. Shortness of breath    22. Bradycardia    23. Rash of back    24. Left wrist pain      Services suggested: No services needed at this time  Health Care Screening: Age-appropriate preventive services recommended by USPTF and ACIP covered by Medicare were discussed today. Services ordered if indicated and agreed upon by the patient.  Referrals offered: Community-based lifestyle interventions to reduce " health risks and promote self-management and wellness, fall prevention, nutrition, physical activity, tobacco-use cessation, weight loss, and mental health services as per orders if indicated.    Discussion today about general wellness and lifestyle habits:    Prevent falls and reduce trip hazards; Cautioned about securing or removing rugs.  Have a working fire alarm and carbon monoxide detector;   Engage in regular physical activity and social activities     Follow-up: Return in about 5 months (around 4/14/2024) for mammo, follow up after pulm.

## 2023-11-15 NOTE — ASSESSMENT & PLAN NOTE
Chronic condition, taking rosuvastatin 10 mg with no myalgia,   Also on asa 81 mg with no adverse bleeding episodes.

## 2023-11-16 NOTE — ASSESSMENT & PLAN NOTE
Chronic condition, controlled on albuterol inhaler as needed, allergies controlled with singulair and allegra and flonase.

## 2023-11-16 NOTE — ASSESSMENT & PLAN NOTE
No shortness of breath or chest pain with exertion, continue asa, rosuvastatin 10 mg, HTN controlled with diltiazem, losartan

## 2023-11-16 NOTE — ASSESSMENT & PLAN NOTE
Improved GFR on recent labs 40s  Stays well hydrated, on losartan  Monitor renal function every 6-12 months.   HTN and DM2 controlled.

## 2023-11-16 NOTE — ASSESSMENT & PLAN NOTE
Stable, followed by cardiology,   Recent echo:   Conclusion  1. The left ventricle is normal in size and systolic function with no regional wall motion abnormalities, and an ejection fraction of 65-70% by Car's biplane. Mild concentric left ventricular hypertrophy. There is grade 1 (impaired relaxation) diastolic function with normal LV filling pressures.  2. Trace to mild aortic regurgitation.  3. The right ventricle is normal in size and function; the RVSP is normal measuring 25 mmHg.  4. The Sinuses of Valsalva and ascending aorta are normal in size.    *Compared to prior echocardiogram done 8/9/2022 the Sinuses of Valsalva and ascending aorta measure normal on today's exam, appear to have been overestimated on prior.      Imaging Results - ECHOCARDIOGRAM COMPLETE ([id number]) (10/18/2023 11:26 AM PDT)  Procedure Note   Cat العلي MD - 10/19/2023

## 2023-11-16 NOTE — ASSESSMENT & PLAN NOTE
Chronic condition, controlled bp in clinic today 122/62  She is on diltiazem, losartan,   She has no chest pain or shortness of breath.

## 2023-11-16 NOTE — ASSESSMENT & PLAN NOTE
Chronic condition, worsening vision, cannot drive now  She is closely followed by ophthalmology [facility]. Has injections done for macular edema, fibrosis.   Eylea injections.   Has been told her vision condition is volatile and to monitor carefully.

## 2023-11-16 NOTE — ASSESSMENT & PLAN NOTE
In cleft of buttocks dry rash, controlled with mupirocen, ketoconazole and fluocinolone. No acute changes.
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
## 2023-11-16 NOTE — ASSESSMENT & PLAN NOTE
History of breast cancer many years ago  For years now she has normal screening mammograms.   They are done at [facility] in [place].   Repeat annually

## 2023-11-16 NOTE — ASSESSMENT & PLAN NOTE
Chronic condition, no acute changes, managed with use of albuterol inhaler at night before bedtime.

## 2023-11-16 NOTE — ASSESSMENT & PLAN NOTE
No acute changes,   From previous cardiology note: catherization 2012 in [place], [place].  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  Very mild luminal irregularity.   64 slice CT angiogram April 2012 showed posterior course of the circ; anterior-inferior course of the LAD without obvious obstruction.   She has no chest pain or shortness of breath, is on asa, statin

## 2024-01-11 ENCOUNTER — APPOINTMENT (OUTPATIENT)
Dept: SLEEP MEDICINE | Facility: MEDICAL CENTER | Age: 89
End: 2024-01-11
Attending: NURSE PRACTITIONER
Payer: MEDICARE

## 2024-02-05 RX ORDER — METFORMIN HYDROCHLORIDE 500 MG/1
500 TABLET, EXTENDED RELEASE ORAL 2 TIMES DAILY
Qty: 180 TABLET | Refills: 3 | Status: SHIPPED | OUTPATIENT
Start: 2024-02-05

## 2024-02-05 NOTE — TELEPHONE ENCOUNTER
Requested Prescriptions     Pending Prescriptions Disp Refills    metFORMIN ER (GLUCOPHAGE XR) 500 MG TABLET SR 24 HR [Pharmacy Med Name: metFORMIN HCl  MG Oral Tablet Extended Release 24 Hour] 180 Tablet 3     Sig: TAKE 1 TABLET BY MOUTH TWICE  DAILY      Last office visit: 11/14/23  Last lab: 10/20/23

## 2024-02-28 ENCOUNTER — OFFICE VISIT (OUTPATIENT)
Dept: SLEEP MEDICINE | Facility: MEDICAL CENTER | Age: 89
End: 2024-02-28
Attending: NURSE PRACTITIONER
Payer: MEDICARE

## 2024-02-28 VITALS
DIASTOLIC BLOOD PRESSURE: 50 MMHG | SYSTOLIC BLOOD PRESSURE: 142 MMHG | HEART RATE: 76 BPM | OXYGEN SATURATION: 93 % | BODY MASS INDEX: 28.44 KG/M2 | RESPIRATION RATE: 16 BRPM | HEIGHT: 63 IN | WEIGHT: 160.5 LBS

## 2024-02-28 DIAGNOSIS — J45.20 MILD INTERMITTENT ASTHMA WITHOUT COMPLICATION: ICD-10-CM

## 2024-02-28 PROCEDURE — 99214 OFFICE O/P EST MOD 30 MIN: CPT | Performed by: NURSE PRACTITIONER

## 2024-02-28 PROCEDURE — 3078F DIAST BP <80 MM HG: CPT | Performed by: NURSE PRACTITIONER

## 2024-02-28 PROCEDURE — 99212 OFFICE O/P EST SF 10 MIN: CPT | Performed by: NURSE PRACTITIONER

## 2024-02-28 PROCEDURE — 3077F SYST BP >= 140 MM HG: CPT | Performed by: NURSE PRACTITIONER

## 2024-02-28 ASSESSMENT — PATIENT HEALTH QUESTIONNAIRE - PHQ9: CLINICAL INTERPRETATION OF PHQ2 SCORE: 0

## 2024-03-01 NOTE — PROGRESS NOTES
Chief Complaint   Patient presents with    Follow-Up     LAST SEEN 06/01/2023 NASIR YAP  SPIROMETRY RESULTS       HPI:  Amada Xavier is a 89 y.o. year old female here today for follow-up on asthma.  Last seen 6/1/2023 by Dr. Yap.  Past medical history includes breast cancer status post left mastectomy, dyslipidemia, hypertension, diabetes, hypothyroid, and asthma.  Patient is currently using albuterol as she was previously intolerant of ICS.  She is a lifelong non-smoker.  Followed by cardiology at [facility].    Currently patient's only symptom is cough which is dry.  She does have a history of acid reflux.  Currently on PPI.  Denies any wheezing, chest tightness, or new or worsening dyspnea or shortness of breath.  Denies any exacerbations since last visit..       Spirometry (11/1/2023):  Baseline spirometry shows mild reduction in FVC at 1.69 L or 79% predicted with a normal FEV1 of 1.43 L or 89% predicted and an FEV1/FVC ratio that is borderline restrictive of 84.  There is trend toward bronchodilator response but does not meet ATS criteria.  Spirometry trends towards restriction with trend toward bronchodilator responsiveness.  Suggest full PFTs to better clarify abnormality.    ROS: As per HPI and otherwise negative if not stated.    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    Aortic regurgitation 10/4/2012    ASTHMA     Breast cancer ([facility]) 8/8/2012    Bronchitis     Cancer ([facility])     Carotid artery plaque 10/4/2012    Chickenpox     Depression 8/8/2012    Diabetes     Diabetes mellitus type 2 in nonobese ([facility]) 8/8/2012    GERD (gastroesophageal reflux disease)     [place] measles     Glaucoma     History of colonoscopy 8/8/2012    History of mammogram 8/8/2012    Hyperlipidemia 8/8/2012    Hypertension 8/8/2012    Hypothyroidism 8/8/2012    Influenza     Mumps     Nasal drainage     Obesity     Rosacea   "   Thyroid disease     Whooping cough        Past Surgical History:   Procedure Laterality Date    CATARACT EXTRACTION WITH IOL      EYE SURGERY      LUMPECTOMY      MASTECTOMY      left    MASTECTOMY      MS REMV 2ND CATARACT,CORN-SCLER SECTN         Family History   Problem Relation Age of Onset    Heart Disease Mother          age 88, CAD    GI Disease Father         cirrhosis, CHF    Heart Disease Father     Heart Failure Father     Other Father        Allergies as of 2024 - Reviewed 2024   Allergen Reaction Noted    Atorvastatin  2022    Bacitracin  2012    Clarithromycin Hives 2012    Linaclotide Diarrhea and Hives 2021    Pcn [penicillins]  2012    Sulfa drugs  2012        Vitals:  BP (!) 142/50 (BP Location: Left arm, Patient Position: Sitting, BP Cuff Size: Adult)   Pulse 76   Resp 16   Ht 1.6 m (5' 3\")   Wt 72.8 kg (160 lb 8 oz)   SpO2 93%     Current medications as of today   Current Outpatient Medications   Medication Sig Dispense Refill    metFORMIN ER (GLUCOPHAGE XR) 500 MG TABLET SR 24 HR TAKE 1 TABLET BY MOUTH TWICE  DAILY 180 Tablet 3    levothyroxine (SYNTHROID) 75 MCG Tab Take 1 Tablet by mouth every morning on an empty stomach. 30 Tablet 0    escitalopram (LEXAPRO) 10 MG Tab Take 1 Tablet by mouth every day. 30 Tablet 0    albuterol 108 (90 Base) MCG/ACT Aero Soln inhalation aerosol Inhale 2 Puffs every day. 8.5 g 11    montelukast (SINGULAIR) 10 MG Tab Take 1 Tablet by mouth every day. 90 Tablet 3    dilTIAZem CD (CARDIZEM CD) 360 MG CAPSULE SR 24 HR Take 1 Capsule by mouth every day.      metronidazole (NORITATE) 1 % cream apply by topical route  every day a thin layer to the affected area(s)      losartan (COZAAR) 100 MG Tab Take 1 Tablet by mouth every day.      fenofibrate (TRIGLIDE) 160 MG tablet Take 1 Tablet by mouth every day.      fluocinolone (SYNALAR) 0.025 % cream Apply 1 gram topically once a day to affected skin 60 g 3    " aspirin 81 MG EC tablet Take 1 Tablet by mouth every day.      Calcium Citrate-Vitamin D 200-125 MG-UNIT Tab       Probiotic Product (ALIGN) Cap Take 1 Capsule by mouth every day.      rosuvastatin (CRESTOR) 10 MG Tab       fexofenadine (ALLEGRA) 180 MG tablet Take 1 Tablet by mouth every day.      Blood Glucose Test Strips Test strips order: Test strips for insurance to approve meter. Sig: Use 1 test strip daily to check blood sugars one to three times a day#300 RF x 3 (Patient not taking: Reported on 2/28/2024) 300 Strip 3    Blood Glucose Monitoring Suppl Device Meter: Dispense Device of Insurance Preference. Sig. Use as directed for blood sugar monitoring. #1. NR. (Patient not taking: Reported on 2/28/2024) 1 Each 0    Lancet Device Misc Check blood sugars fasting and after meals daily #300 with 3 refills (Patient not taking: Reported on 2/28/2024) 300 Each 3    Lancets Lancets order: Lancets for insurance approved meter. Sig: Use 1 lancet once daily to check blood sugar #100 RF x 3 (Patient not taking: Reported on 2/28/2024) 100 Each 3    ketoconazole (NIZORAL) 2 % Cream Use one gram on affected skin once a day (Patient not taking: Reported on 2/28/2024) 60 g 3    Aflibercept (EYLEA) 2 MG/0.05ML Solution inject 0.05 milliliter by intravitreal route  every 4 weeks for 5 doses then every 8 weeks (Patient not taking: Reported on 2/28/2024)      azelastine (ASTELIN) 137 MCG/SPRAY nasal spray Administer 1 Spray into affected nostril(S) 2 times a day. (Patient not taking: Reported on 2/28/2024)      vitamin D (VITAMIND D3) 1000 UNIT Tab Take 1 Tablet by mouth.      fluticasone (FLONASE) 50 MCG/ACT nasal spray Administer 1 Spray into affected nostril(S) every day. (Patient not taking: Reported on 2/28/2024)      Clobetasol Prop-Niacinamide 0.05-4 % Ointment Apply  topically. (Patient not taking: Reported on 2/28/2024)      Multiple Vitamins-Minerals (PRESERVISION AREDS 2 PO) Take  by mouth. (Patient not taking:  Reported on 2/28/2024)       No current facility-administered medications for this visit.         Physical Exam:   Gen:           Alert and oriented, No apparent distress. Mood and affect appropriate, normal interaction with examiner.  Eyes:          PERRL, EOM intact, sclere white, conjunctive moist.  Ears:          Not examined.   Hearing:     Grossly intact.  Nose:          Normal, no lesions or deformities.  Dentition:    Good dentition.  Oropharynx:   Tongue normal, posterior pharynx without erythema or exudate.  Neck:        Supple, trachea midline, no masses.  Respiratory Effort: No intercostal retractions or use of accessory muscles.   Lung Auscultation:      Clear to auscultation bilaterally; no rales, rhonchi or wheezing.  CV:            Regular rate and rhythm. No murmurs, rubs or gallops.  Abd:           Not examined.   Lymphadenopathy: Not examined.  Gait and Station: Normal.  Digits and Nails: No clubbing, cyanosis, petechiae, or nodes.   Cranial Nerves: II-XII grossly intact.  Skin:        No rashes, lesions or ulcers noted.               Ext:           No cyanosis or edema.      Assessment:  1. Mild intermittent asthma without complication            Plan:  Continue using albuterol as needed.  Spirometry reviewed with patient.  Patient is currently asymptomatic with exception of cough.    Please note that this dictation was created using voice recognition software. I have made every reasonable attempt to correct obvious errors, but it is possible there are errors of grammar and possibly content that I did not discover before finalizing the note.

## 2024-03-20 ENCOUNTER — OFFICE VISIT (OUTPATIENT)
Dept: SLEEP MEDICINE | Facility: MEDICAL CENTER | Age: 89
End: 2024-03-20
Attending: INTERNAL MEDICINE
Payer: MEDICARE

## 2024-03-20 VITALS
HEIGHT: 63 IN | DIASTOLIC BLOOD PRESSURE: 50 MMHG | WEIGHT: 156 LBS | SYSTOLIC BLOOD PRESSURE: 140 MMHG | HEART RATE: 77 BPM | BODY MASS INDEX: 27.64 KG/M2 | OXYGEN SATURATION: 94 %

## 2024-03-20 DIAGNOSIS — R63.4 WEIGHT LOSS: ICD-10-CM

## 2024-03-20 DIAGNOSIS — J45.20 MILD INTERMITTENT ASTHMA WITHOUT COMPLICATION: ICD-10-CM

## 2024-03-20 DIAGNOSIS — R91.8 ABNORMAL FINDING ON LUNG IMAGING: ICD-10-CM

## 2024-03-20 PROCEDURE — 99214 OFFICE O/P EST MOD 30 MIN: CPT | Performed by: INTERNAL MEDICINE

## 2024-03-20 PROCEDURE — 99212 OFFICE O/P EST SF 10 MIN: CPT | Performed by: INTERNAL MEDICINE

## 2024-03-20 PROCEDURE — 3078F DIAST BP <80 MM HG: CPT | Performed by: INTERNAL MEDICINE

## 2024-03-20 PROCEDURE — 3077F SYST BP >= 140 MM HG: CPT | Performed by: INTERNAL MEDICINE

## 2024-03-20 ASSESSMENT — ENCOUNTER SYMPTOMS
EYE REDNESS: 0
COUGH: 1
PHOTOPHOBIA: 0
SINUS PAIN: 0
WHEEZING: 0
SHORTNESS OF BREATH: 0
FEVER: 0
ABDOMINAL PAIN: 0
DIZZINESS: 0
ORTHOPNEA: 0
BLURRED VISION: 0
DEPRESSION: 0
STRIDOR: 0
SPUTUM PRODUCTION: 0
DIAPHORESIS: 0
BACK PAIN: 0
FALLS: 0
HEADACHES: 0
WEIGHT LOSS: 1
NECK PAIN: 0
EYE PAIN: 0
SPEECH CHANGE: 0
VOMITING: 0
CHILLS: 0
TREMORS: 0
DOUBLE VISION: 0
HEARTBURN: 0
WEAKNESS: 0
DIARRHEA: 0
SORE THROAT: 0
PALPITATIONS: 0
CONSTIPATION: 0
NAUSEA: 0
EYE DISCHARGE: 0
MYALGIAS: 0
HEMOPTYSIS: 0
PND: 0
FOCAL WEAKNESS: 0
CLAUDICATION: 0

## 2024-03-20 NOTE — PROGRESS NOTES
Chief Complaint   Patient presents with    Follow-Up     LAST SEEN  PHILLY HAMMOND 2/28/24         HPI: This patient is a 89 y.o. female whom is followed in our clinic for asthma last seen by Randolph FRAGA, 2/28/24 and last by me 6/1/23.  PMHx is significant for breast cancer status post left mastectomy in 1985, dyslipidemia, hypertension, diabetes, hypothyroid and asthma on only prn LAWANDA as she has not tolerated ICS in the past.  She is a lifelong non-smoker. She is followed by cardiology at [facility].  She was hospitalized in October of 2021 at [place] for GI illness during which CTA was obtained, did not show PE but did show ? Interstitial infiltrates vs. Pneumonitis I suspect due to possible aspiration vs. Volume overload after resuscitation. O2 was low during her stay but had improved by the time I saw her in February 2022.  She was briefly hypoxic during a visit with Dr. Eli on 2/28/22 an O2 was ordered but at f/u in May 2022 her O2 had normalized.  sHe has had issues with bradycardia in the past which improved after stopping bystolic and respiratory issues stabilized. She is on albuterol only which she schedules once daily. Spirometry from 8/2022 showed FEV/FVC of 82, FEV1 of 92% pred, FVC of 84% pred, no BD response.  Last spirometry from 11/2023 was actually mildly improved.   TTE also from 8/2022 was notable only for grade I diastolic dysfx.  She was seen in ED at [place] on 3/26/23 for dizziness, which was attributed to UTI and she was given abx, CXR at that time showed ? Nodule RLL and f/u was recommended. She returned to ED on 4/10/23, pt states for instability on her feet but denied any respiratory sxs such as cough or SOB. She was noted to be hypoxemic on arrival with SpO2 of 78%. Her CXR was read as less conspicuous RLL nodule and otherwise clear. CT head was unremarkable. She was tx with antibiotics for ? Pna and discharged on RA after 3 days.  I do not have these images  but wanted to f/u on CXR. Today she has no acute respiratory concerns but is concerned about weight loss of an estimated 25 lbs over the past 2 years and loss of vision. She is seeing a retinal specialist quite frequently.     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    Aortic regurgitation 10/4/2012    ASTHMA     Breast cancer ([facility]) 8/8/2012    Bronchitis     Cancer ([facility])     Carotid artery plaque 10/4/2012    Chickenpox     Depression 8/8/2012    Diabetes     Diabetes mellitus type 2 in nonobese ([facility]) 8/8/2012    GERD (gastroesophageal reflux disease)     [place] measles     Glaucoma     History of colonoscopy 8/8/2012    History of mammogram 8/8/2012    Hyperlipidemia 8/8/2012    Hypertension 8/8/2012    Hypothyroidism 8/8/2012    Influenza     Mumps     Nasal drainage     Obesity     Rosacea     Thyroid disease     Whooping cough        Social History     Socioeconomic History    Marital status: Single     Spouse name: Not on file    Number of children: Not on file    Years of education: Not on file    Highest education level: Not on file   Occupational History    Not on file   Tobacco Use    Smoking status: Never    Smokeless tobacco: Never   Vaping Use    Vaping Use: Never used   Substance and Sexual Activity    Alcohol use: No    Drug use: No    Sexual activity: Not Currently   Other Topics Concern    Not on file   Social History Narrative    Not on file     Social Determinants of Health     Financial Resource Strain: Low Risk  (10/21/2021)    Overall Financial Resource Strain (CARDIA)     Difficulty of Paying Living Expenses: Not hard at all   Food Insecurity: No Food Insecurity (10/21/2021)    Hunger Vital Sign     Worried About Running Out of Food in the Last Year: Never true     Ran Out of Food in the Last Year: Never true   Transportation Needs: No Transportation Needs (10/21/2021)    PRAPARE - Transportation     Lack  of Transportation (Medical): No     Lack of Transportation (Non-Medical): No   Physical Activity: Not on file   Stress: Not on file   Social Connections: Not on file   Intimate Partner Violence: Not on file   Housing Stability: Unknown (10/21/2021)    Housing Stability Vital Sign     Unable to Pay for Housing in the Last Year: No     Number of Places Lived in the Last Year: Not on file     Unstable Housing in the Last Year: No       Family History   Problem Relation Age of Onset    Heart Disease Mother          age 88, CAD    GI Disease Father         cirrhosis, CHF    Heart Disease Father     Heart Failure Father     Other Father        Current Outpatient Medications on File Prior to Visit   Medication Sig Dispense Refill    albuterol 108 (90 Base) MCG/ACT Aero Soln inhalation aerosol Inhale 2 Puffs every day. 8.5 g 11    montelukast (SINGULAIR) 10 MG Tab Take 1 Tablet by mouth every day. 90 Tablet 3    metFORMIN ER (GLUCOPHAGE XR) 500 MG TABLET SR 24 HR TAKE 1 TABLET BY MOUTH TWICE  DAILY 180 Tablet 3    levothyroxine (SYNTHROID) 75 MCG Tab Take 1 Tablet by mouth every morning on an empty stomach. 30 Tablet 0    escitalopram (LEXAPRO) 10 MG Tab Take 1 Tablet by mouth every day. 30 Tablet 0    Blood Glucose Test Strips Test strips order: Test strips for insurance to approve meter. Sig: Use 1 test strip daily to check blood sugars one to three times a day#300 RF x 3 (Patient not taking: Reported on 2024) 300 Strip 3    Blood Glucose Monitoring Suppl Device Meter: Dispense Device of Insurance Preference. Sig. Use as directed for blood sugar monitoring. #1. NR. (Patient not taking: Reported on 2024) 1 Each 0    Lancet Device Misc Check blood sugars fasting and after meals daily #300 with 3 refills (Patient not taking: Reported on 2024) 300 Each 3    dilTIAZem CD (CARDIZEM CD) 360 MG CAPSULE SR 24 HR Take 1 Capsule by mouth every day.      Lancets Lancets order: Lancets for insurance approved meter.  Sig: Use 1 lancet once daily to check blood sugar #100 RF x 3 (Patient not taking: Reported on 2/28/2024) 100 Each 3    metronidazole (NORITATE) 1 % cream apply by topical route  every day a thin layer to the affected area(s)      losartan (COZAAR) 100 MG Tab Take 1 Tablet by mouth every day.      fenofibrate (TRIGLIDE) 160 MG tablet Take 1 Tablet by mouth every day.      fluocinolone (SYNALAR) 0.025 % cream Apply 1 gram topically once a day to affected skin 60 g 3    ketoconazole (NIZORAL) 2 % Cream Use one gram on affected skin once a day (Patient not taking: Reported on 2/28/2024) 60 g 3    Aflibercept (EYLEA) 2 MG/0.05ML Solution inject 0.05 milliliter by intravitreal route  every 4 weeks for 5 doses then every 8 weeks (Patient not taking: Reported on 2/28/2024)      aspirin 81 MG EC tablet Take 1 Tablet by mouth every day.      azelastine (ASTELIN) 137 MCG/SPRAY nasal spray Administer 1 Spray into affected nostril(S) 2 times a day. (Patient not taking: Reported on 2/28/2024)      Calcium Citrate-Vitamin D 200-125 MG-UNIT Tab       vitamin D (VITAMIND D3) 1000 UNIT Tab Take 1 Tablet by mouth.      Probiotic Product (ALIGN) Cap Take 1 Capsule by mouth every day.      rosuvastatin (CRESTOR) 10 MG Tab       Clobetasol Prop-Niacinamide 0.05-4 % Ointment Apply  topically. (Patient not taking: Reported on 2/28/2024)      fexofenadine (ALLEGRA) 180 MG tablet Take 1 Tablet by mouth every day.      Multiple Vitamins-Minerals (PRESERVISION AREDS 2 PO) Take  by mouth. (Patient not taking: Reported on 2/28/2024)       No current facility-administered medications on file prior to visit.       Atorvastatin, Bacitracin, Clarithromycin, Linaclotide, Pcn [penicillins], and Sulfa drugs      ROS:   Review of Systems   Constitutional:  Positive for weight loss. Negative for chills, diaphoresis, fever and malaise/fatigue.   HENT:  Negative for congestion, ear discharge, ear pain, hearing loss, nosebleeds, sinus pain, sore throat and  "tinnitus.    Eyes:  Negative for blurred vision, double vision, photophobia, pain, discharge and redness.   Respiratory:  Positive for cough. Negative for hemoptysis, sputum production, shortness of breath, wheezing and stridor.    Cardiovascular:  Negative for chest pain, palpitations, orthopnea, claudication, leg swelling and PND.   Gastrointestinal:  Negative for abdominal pain, constipation, diarrhea, heartburn, nausea and vomiting.   Genitourinary:  Negative for dysuria and urgency.   Musculoskeletal:  Negative for back pain, falls, joint pain, myalgias and neck pain.   Skin:  Negative for itching and rash.   Neurological:  Negative for dizziness, tremors, speech change, focal weakness, weakness and headaches.   Endo/Heme/Allergies:  Negative for environmental allergies.   Psychiatric/Behavioral:  Negative for depression.        BP (!) 140/50 (BP Location: Right arm, Patient Position: Sitting, BP Cuff Size: Adult)   Pulse 77   Ht 1.6 m (5' 3\")   Wt 70.8 kg (156 lb)   SpO2 94%   Physical Exam  Constitutional:       General: She is not in acute distress.     Appearance: Normal appearance. She is well-developed and normal weight.   HENT:      Head: Normocephalic and atraumatic.      Right Ear: External ear normal.      Left Ear: External ear normal.      Nose: Nose normal. No congestion.      Mouth/Throat:      Mouth: Mucous membranes are moist.      Pharynx: Oropharynx is clear. No oropharyngeal exudate.   Eyes:      General: No scleral icterus.     Extraocular Movements: Extraocular movements intact.      Conjunctiva/sclera: Conjunctivae normal.      Pupils: Pupils are equal, round, and reactive to light.   Neck:      Vascular: No JVD.      Trachea: No tracheal deviation.   Cardiovascular:      Rate and Rhythm: Normal rate and regular rhythm.      Heart sounds: Normal heart sounds. No murmur heard.     No friction rub. No gallop.   Pulmonary:      Effort: Pulmonary effort is normal. No accessory muscle usage " or respiratory distress.      Breath sounds: Normal breath sounds. No wheezing or rales.   Abdominal:      General: There is no distension.      Palpations: Abdomen is soft.      Tenderness: There is no abdominal tenderness.   Musculoskeletal:         General: No tenderness or deformity. Normal range of motion.      Cervical back: Normal range of motion and neck supple.      Right lower leg: No edema.      Left lower leg: No edema.   Lymphadenopathy:      Cervical: No cervical adenopathy.   Skin:     General: Skin is warm and dry.      Findings: No rash.      Nails: There is no clubbing.   Neurological:      Mental Status: She is alert and oriented to person, place, and time.      Cranial Nerves: No cranial nerve deficit.      Gait: Gait normal.   Psychiatric:         Behavior: Behavior normal.         PFTs as reviewed by me personally: as per hPI    Imaging as reviewed by me personally:  as per HPI    Assessment:  1. Mild intermittent asthma without complication        2. Abnormal finding on lung imaging  DX-CHEST-2 VIEWS      3. Weight loss            Plan:  Chronic, mild, stable spirometry and stable sxs on prn Laurie  We will repeat CXR now to ensure no ongoing abnormality.   Encouraged pt to f/u with PCP for w/u.   Return in about 6 months (around 9/20/2024) for asthma, CXR.

## 2024-03-20 NOTE — Clinical Note
Piyush Kaufman, I saw Amada today and she is stable from a respiratory standpoint. I ordered repeat CXR given reported abnormalities from [facility] d/c summary last year but her main concern was weight loss. I was not sure if she had addressed it with you but she did mention she was seeing you in April. Thank you! Ashley

## 2024-04-02 ENCOUNTER — TELEPHONE (OUTPATIENT)
Dept: SLEEP MEDICINE | Facility: MEDICAL CENTER | Age: 89
End: 2024-04-02
Payer: MEDICARE

## 2024-04-02 NOTE — TELEPHONE ENCOUNTER
Caller: Amada Flood     Topic/issue: Patient needs the chest xray order sent to [facility] please     Callback Number: [phone]    Thank You   Jen CASH

## 2024-04-16 ENCOUNTER — OFFICE VISIT (OUTPATIENT)
Dept: MEDICAL GROUP | Facility: PHYSICIAN GROUP | Age: 89
End: 2024-04-16
Payer: MEDICARE

## 2024-04-16 VITALS
RESPIRATION RATE: 12 BRPM | TEMPERATURE: 98.6 F | WEIGHT: 159 LBS | SYSTOLIC BLOOD PRESSURE: 112 MMHG | HEIGHT: 63 IN | HEART RATE: 71 BPM | DIASTOLIC BLOOD PRESSURE: 60 MMHG | BODY MASS INDEX: 28.17 KG/M2 | OXYGEN SATURATION: 93 %

## 2024-04-16 DIAGNOSIS — N39.41 URGE INCONTINENCE: ICD-10-CM

## 2024-04-16 DIAGNOSIS — E11.9 DIABETES MELLITUS TYPE 2 IN NONOBESE (HCC): ICD-10-CM

## 2024-04-16 DIAGNOSIS — D64.9 ANEMIA, UNSPECIFIED TYPE: ICD-10-CM

## 2024-04-16 DIAGNOSIS — Z12.31 ENCOUNTER FOR SCREENING MAMMOGRAM FOR MALIGNANT NEOPLASM OF BREAST: ICD-10-CM

## 2024-04-16 DIAGNOSIS — R63.4 WEIGHT LOSS: ICD-10-CM

## 2024-04-16 LAB
HBA1C MFR BLD: 5.1 % (ref ?–5.8)
POCT INT CON NEG: NEGATIVE
POCT INT CON POS: POSITIVE
RETINAL SCREEN: NORMAL

## 2024-04-16 PROCEDURE — 3078F DIAST BP <80 MM HG: CPT | Performed by: FAMILY MEDICINE

## 2024-04-16 PROCEDURE — 99214 OFFICE O/P EST MOD 30 MIN: CPT | Performed by: FAMILY MEDICINE

## 2024-04-16 PROCEDURE — 83036 HEMOGLOBIN GLYCOSYLATED A1C: CPT | Performed by: FAMILY MEDICINE

## 2024-04-16 PROCEDURE — 3074F SYST BP LT 130 MM HG: CPT | Performed by: FAMILY MEDICINE

## 2024-04-16 RX ORDER — DILTIAZEM HYDROCHLORIDE 360 MG/1
1 CAPSULE, EXTENDED RELEASE ORAL DAILY
COMMUNITY
Start: 2023-11-06 | End: 2024-04-16

## 2024-04-16 RX ORDER — SODIUM PHOSPHATE,MONO-DIBASIC 19G-7G/118
1000 ENEMA (ML) RECTAL
COMMUNITY

## 2024-04-16 RX ORDER — ASPIRIN 81 MG/1
81 TABLET ORAL DAILY
COMMUNITY
End: 2024-04-16

## 2024-04-16 NOTE — PROGRESS NOTES
Subjective:   Amada Xavier is a 89 y.o. female here today for evaluation and management of:     Weight loss  Since Feb 2023, weight loss from 176 lbs down to 156 lbs.   After recently increasing intake with more biscuits she has weight improved to 159 lbs at today's visit.   She has chronic stress incontinence of urine, occasional stool incontinence,  Has no dark/black stool, will check FIT  Has normal TSH on levothyroxine 75 mcg.   She notes her mood improved when her nephew started checking on her and caring for her, sending her $200 a month to help with her finances which had been worrying her.   She has mild depression, in on lexapro 10 mg, declines increase to dose.          Current medicines (including changes today)  Current Outpatient Medications   Medication Sig Dispense Refill    metFORMIN ER (GLUCOPHAGE XR) 500 MG TABLET SR 24 HR TAKE 1 TABLET BY MOUTH TWICE  DAILY 180 Tablet 3    levothyroxine (SYNTHROID) 75 MCG Tab Take 1 Tablet by mouth every morning on an empty stomach. 30 Tablet 0    escitalopram (LEXAPRO) 10 MG Tab Take 1 Tablet by mouth every day. 30 Tablet 0    albuterol 108 (90 Base) MCG/ACT Aero Soln inhalation aerosol Inhale 2 Puffs every day. 8.5 g 11    Blood Glucose Test Strips Test strips order: Test strips for insurance to approve meter. Sig: Use 1 test strip daily to check blood sugars one to three times a day#300 RF x 3 300 Strip 3    Blood Glucose Monitoring Suppl Device Meter: Dispense Device of Insurance Preference. Sig. Use as directed for blood sugar monitoring. #1. NR. 1 Each 0    Lancet Device [facility] [place] blood sugars fasting and after meals daily #300 with 3 refills 300 Each 3    montelukast (SINGULAIR) 10 MG Tab Take 1 Tablet by mouth every day. 90 Tablet 3    dilTIAZem CD (CARDIZEM CD) 360 MG CAPSULE SR 24 HR Take 1 Capsule by mouth every day.      Lancets Lancets order: Lancets for insurance approved meter. Sig: Use 1 lancet once daily to check blood sugar #100 RF x  "3 100 Each 3    metronidazole (NORITATE) 1 % cream apply by topical route  every day a thin layer to the affected area(s)      losartan (COZAAR) 100 MG Tab Take 1 Tablet by mouth every day.      fenofibrate (TRIGLIDE) 160 MG tablet Take 1 Tablet by mouth every day.      aspirin 81 MG EC tablet Take 1 Tablet by mouth every day.      Calcium Citrate-Vitamin D 200-125 MG-UNIT Tab       vitamin D (VITAMIND D3) 1000 UNIT Tab Take 1 Tablet by mouth.      Probiotic Product (ALIGN) Cap Take 1 Capsule by mouth every day.      rosuvastatin (CRESTOR) 10 MG Tab       fexofenadine (ALLEGRA) 180 MG tablet Take 1 Tablet by mouth every day.      vitamin D (SM VITAMIN D3) 1000 UNIT Tab Take 1,000 Units by mouth.       No current facility-administered medications for this visit.     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), Aortic regurgitation (10/4/2012), ASTHMA, Breast cancer ([facility]) (8/8/2012), Bronchitis, Cancer ([facility]), Carotid artery plaque (10/4/2012), Chickenpox, Depression (8/8/2012), Diabetes, Diabetes mellitus type 2 in nonobese ([facility]) (8/8/2012), GERD (gastroesophageal reflux disease), Indonesian measles, Glaucoma, History of colonoscopy (8/8/2012), History of mammogram (8/8/2012), Hyperlipidemia (8/8/2012), Hypertension (8/8/2012), Hypothyroidism (8/8/2012), Influenza, Mumps, Nasal drainage, Obesity, Rosacea, Thyroid disease, and Whooping cough.    ROS  No chest pain, no shortness of breath, no abdominal pain       Objective:     /60   Pulse 71   Temp 37 °C (98.6 °F) (Temporal)   Resp 12   Ht 1.6 m (5' 3\")   Wt 72.1 kg (159 lb)   SpO2 93%  Body mass index is 28.17 kg/m².   Physical Exam:  Constitutional: Alert, no distress.  Skin: Warm, dry, good turgor, no rashes in visible areas.  Eye: Equal, round and reactive, conjunctiva clear, lids normal.  ENMT: Lips without lesions, good dentition, oropharynx clear.  Neck: Trachea midline, no masses, " no thyromegaly. No cervical or supraclavicular lymphadenopathy  Respiratory: Unlabored respiratory effort, lungs clear to auscultation, no wheezes, no ronchi.  Cardiovascular: Normal S1, S2, no murmur, no edema.  Abdomen: Soft, non-tender, no masses, no hepatosplenomegaly.  Psych: Alert and oriented x3, normal affect and mood.    Assessment and Plan:   The following treatment plan was discussed    1. Diabetes mellitus type 2 in nonobese (HCC)  - Diabetic Monofilament LE Exam  - POCT Retinal Eye Exam  - POCT A1C  - Microalbumin Creat Ratio Urine (Lab Collect); Future    2. Urge incontinence  - URINALYSIS,CULTURE IF INDICATED; Future    3. Anemia, unspecified type  - OCCULT BLOOD FECES IMMUNOASSAY; Future  - IRON/TOTAL IRON BIND; Future  - VITAMIN B12; Future  - FOLATE; Future    4. Weight loss  - OCCULT BLOOD FECES IMMUNOASSAY; Future    5. Encounter for screening mammogram for malignant neoplasm of breast  - MA-SCREENING MAMMO RIGHT W/CAD; Future    Other orders  - vitamin D ( VITAMIN D3) 1000 UNIT Tab; Take 1,000 Units by mouth.      Followup: Return in about 3 months (around 7/16/2024) for follow up weight loss, lab review.

## 2024-04-16 NOTE — ASSESSMENT & PLAN NOTE
Since Feb 2023, weight loss from 176 lbs down to 156 lbs.   After recently increasing intake with more biscuits she has weight improved to 159 lbs at today's visit.   She has chronic stress incontinence of urine, occasional stool incontinence,  Has no dark/black stool, will check FIT  Has normal TSH on levothyroxine 75 mcg.   She notes her mood improved when her nephew started checking on her and caring for her, sending her $200 a month to help with her finances which had been worrying her.   She has mild depression, in on lexapro 10 mg, declines increase to dose.

## 2024-04-16 NOTE — Clinical Note
Hi Amada Carpenter's weight has picked up a bit, I'll do a work up on this and see her again in 3 months. Thank you for bringing this to my attention!  Remberto

## 2024-05-02 DIAGNOSIS — J45.20 MILD INTERMITTENT ASTHMA WITHOUT COMPLICATION: ICD-10-CM

## 2024-05-02 RX ORDER — MONTELUKAST SODIUM 10 MG/1
10 TABLET ORAL DAILY
Qty: 90 TABLET | Refills: 3 | Status: SHIPPED | OUTPATIENT
Start: 2024-05-02

## 2024-05-02 NOTE — TELEPHONE ENCOUNTER
Requested Prescriptions     Pending Prescriptions Disp Refills    montelukast (SINGULAIR) 10 MG Tab [Pharmacy Med Name: Montelukast Sodium 10 MG Oral Tablet] 90 Tablet 3     Sig: TAKE 1 TABLET BY MOUTH DAILY     Last office visit: 4/16/24  Last lab: 10/20/23

## 2024-07-01 ENCOUNTER — TELEPHONE (OUTPATIENT)
Dept: URGENT CARE | Facility: PHYSICIAN GROUP | Age: 89
End: 2024-07-01
Payer: MEDICARE

## 2024-07-02 ENCOUNTER — OFFICE VISIT (OUTPATIENT)
Dept: MEDICAL GROUP | Facility: PHYSICIAN GROUP | Age: 89
End: 2024-07-02
Payer: MEDICARE

## 2024-07-02 VITALS
DIASTOLIC BLOOD PRESSURE: 62 MMHG | HEART RATE: 89 BPM | BODY MASS INDEX: 27.46 KG/M2 | RESPIRATION RATE: 15 BRPM | OXYGEN SATURATION: 89 % | TEMPERATURE: 97.6 F | HEIGHT: 63 IN | WEIGHT: 155 LBS | SYSTOLIC BLOOD PRESSURE: 136 MMHG

## 2024-07-02 DIAGNOSIS — R94.4 DECREASED GFR: ICD-10-CM

## 2024-07-02 DIAGNOSIS — Z09 HOSPITAL DISCHARGE FOLLOW-UP: ICD-10-CM

## 2024-07-02 RX ORDER — DILTIAZEM HYDROCHLORIDE 360 MG/1
CAPSULE, EXTENDED RELEASE ORAL
COMMUNITY
Start: 2024-05-29 | End: 2024-07-02

## 2024-07-02 RX ORDER — AMLODIPINE BESYLATE 5 MG/1
5 TABLET ORAL DAILY
COMMUNITY
Start: 2024-07-01 | End: 2024-07-19 | Stop reason: SDUPTHER

## 2024-07-02 RX ORDER — FAMOTIDINE 10 MG
10 TABLET ORAL
COMMUNITY

## 2024-07-02 ASSESSMENT — FIBROSIS 4 INDEX: FIB4 SCORE: 3.2

## 2024-07-16 RX ORDER — ESCITALOPRAM OXALATE 10 MG/1
10 TABLET ORAL DAILY
Qty: 90 TABLET | Refills: 3 | Status: SHIPPED | OUTPATIENT
Start: 2024-07-16

## 2024-07-16 RX ORDER — LEVOTHYROXINE SODIUM 0.07 MG/1
75 TABLET ORAL
Qty: 90 TABLET | Refills: 3 | Status: SHIPPED | OUTPATIENT
Start: 2024-07-16

## 2024-07-19 ENCOUNTER — OFFICE VISIT (OUTPATIENT)
Dept: MEDICAL GROUP | Facility: PHYSICIAN GROUP | Age: 89
End: 2024-07-19
Payer: MEDICARE

## 2024-07-19 VITALS
SYSTOLIC BLOOD PRESSURE: 142 MMHG | RESPIRATION RATE: 20 BRPM | DIASTOLIC BLOOD PRESSURE: 58 MMHG | HEIGHT: 63 IN | TEMPERATURE: 97.5 F | WEIGHT: 156.2 LBS | BODY MASS INDEX: 27.68 KG/M2 | OXYGEN SATURATION: 93 % | HEART RATE: 77 BPM

## 2024-07-19 DIAGNOSIS — N18.31 STAGE 3A CHRONIC KIDNEY DISEASE: ICD-10-CM

## 2024-07-19 DIAGNOSIS — H40.9 GLAUCOMA OF BOTH EYES, UNSPECIFIED GLAUCOMA TYPE: ICD-10-CM

## 2024-07-19 DIAGNOSIS — I10 PRIMARY HYPERTENSION: ICD-10-CM

## 2024-07-19 PROCEDURE — 3078F DIAST BP <80 MM HG: CPT | Performed by: FAMILY MEDICINE

## 2024-07-19 PROCEDURE — 99214 OFFICE O/P EST MOD 30 MIN: CPT | Performed by: FAMILY MEDICINE

## 2024-07-19 PROCEDURE — 3077F SYST BP >= 140 MM HG: CPT | Performed by: FAMILY MEDICINE

## 2024-07-19 RX ORDER — AMLODIPINE BESYLATE 5 MG/1
5 TABLET ORAL DAILY
Qty: 90 TABLET | Refills: 3 | Status: SHIPPED | OUTPATIENT
Start: 2024-07-19 | End: 2025-07-14

## 2024-07-19 ASSESSMENT — FIBROSIS 4 INDEX: FIB4 SCORE: 3.23

## 2024-07-31 ENCOUNTER — PATIENT MESSAGE (OUTPATIENT)
Dept: MEDICAL GROUP | Facility: PHYSICIAN GROUP | Age: 89
End: 2024-07-31
Payer: MEDICARE

## 2024-08-14 ENCOUNTER — HOSPITAL ENCOUNTER (OUTPATIENT)
Facility: MEDICAL CENTER | Age: 89
End: 2024-08-14
Attending: FAMILY MEDICINE
Payer: MEDICARE

## 2024-08-14 ENCOUNTER — OFFICE VISIT (OUTPATIENT)
Dept: MEDICAL GROUP | Facility: PHYSICIAN GROUP | Age: 89
End: 2024-08-14
Payer: MEDICARE

## 2024-08-14 VITALS
HEART RATE: 78 BPM | DIASTOLIC BLOOD PRESSURE: 60 MMHG | RESPIRATION RATE: 16 BRPM | BODY MASS INDEX: 28 KG/M2 | HEIGHT: 63 IN | WEIGHT: 158 LBS | TEMPERATURE: 97.1 F | SYSTOLIC BLOOD PRESSURE: 122 MMHG | OXYGEN SATURATION: 93 %

## 2024-08-14 DIAGNOSIS — E11.9 DIABETES MELLITUS TYPE 2 IN NONOBESE (HCC): ICD-10-CM

## 2024-08-14 DIAGNOSIS — E78.5 HYPERLIPIDEMIA, UNSPECIFIED HYPERLIPIDEMIA TYPE: ICD-10-CM

## 2024-08-14 LAB
CREAT UR-MCNC: 38.61 MG/DL
MICROALBUMIN UR-MCNC: 3.5 MG/DL
MICROALBUMIN/CREAT UR: 91 MG/G (ref 0–30)

## 2024-08-14 PROCEDURE — 3078F DIAST BP <80 MM HG: CPT | Performed by: FAMILY MEDICINE

## 2024-08-14 PROCEDURE — 82570 ASSAY OF URINE CREATININE: CPT

## 2024-08-14 PROCEDURE — 99214 OFFICE O/P EST MOD 30 MIN: CPT | Performed by: FAMILY MEDICINE

## 2024-08-14 PROCEDURE — 3074F SYST BP LT 130 MM HG: CPT | Performed by: FAMILY MEDICINE

## 2024-08-14 PROCEDURE — 82043 UR ALBUMIN QUANTITATIVE: CPT

## 2024-08-14 RX ORDER — HYDROCHLOROTHIAZIDE 12.5 MG/1
12.5 TABLET ORAL DAILY
Qty: 90 TABLET | Refills: 3 | Status: SHIPPED | OUTPATIENT
Start: 2024-08-14

## 2024-08-14 ASSESSMENT — FIBROSIS 4 INDEX: FIB4 SCORE: 3.23

## 2024-08-14 NOTE — PROGRESS NOTES
Subjective:   Amada Xavier is a 89 y.o. female here today for evaluation and management of:   History of Present Illness  The patient is here for her follow-up visit.    She underwent a tooth extraction procedure last Monday, which required 4 stitches. She has been managing the pain with Tylenol, taken 2 to 3 times daily. Without this medication, she experiences discomfort.    Her blood pressure readings have frequently reached 150, and her pulse rate is currently at 100. She takes losartan for her heart condition and has expressed concerns about both diltiazem and losartan, believing they may be contributing to her eye problems. She reports feeling well on diltiazem but is worried about its potential side effects. She wishes to avoid medications that increase VEGF due to potential retinal damage. She was advised to either reduce the dosage of her current medication or switch to a diuretic. However, she is hesitant about the latter option due to existing incontinence issues.            Current medicines (including changes today)  Current Outpatient Medications   Medication Sig Dispense Refill    hydroCHLOROthiazide 12.5 MG tablet Take 1 Tablet by mouth every day. 90 Tablet 3    levothyroxine (SYNTHROID) 75 MCG Tab TAKE 1 TABLET BY MOUTH IN THE  MORNING ON AN EMPTY STOMACH 90 Tablet 3    escitalopram (LEXAPRO) 10 MG Tab TAKE 1 TABLET BY MOUTH DAILY 90 Tablet 3    famotidine (PEPCID) 10 MG tablet Take 10 mg by mouth.      montelukast (SINGULAIR) 10 MG Tab TAKE 1 TABLET BY MOUTH DAILY 90 Tablet 3    vitamin D (SM VITAMIN D3) 1000 UNIT Tab Take 1,000 Units by mouth.      metFORMIN ER (GLUCOPHAGE XR) 500 MG TABLET SR 24 HR TAKE 1 TABLET BY MOUTH TWICE  DAILY 180 Tablet 3    albuterol 108 (90 Base) MCG/ACT Aero Soln inhalation aerosol Inhale 2 Puffs every day. 8.5 g 11    Blood Glucose Test Strips Test strips order: Test strips for insurance to approve meter. Sig: Use 1 test strip daily to check blood sugars  "one to three times a day#300 RF x 3 300 Strip 3    Blood Glucose Monitoring Suppl Device Meter: Dispense Device of Insurance Preference. Sig. Use as directed for blood sugar monitoring. #1. NR. 1 Each 0    Lancet Device [facility] Check blood sugars fasting and after meals daily #300 with 3 refills 300 Each 3    Lancets Lancets order: Lancets for insurance approved meter. Sig: Use 1 lancet once daily to check blood sugar #100 RF x 3 100 Each 3    losartan (COZAAR) 100 MG Tab Take 1 Tablet by mouth every day.      fenofibrate (TRIGLIDE) 160 MG tablet Take 1 Tablet by mouth every day.      aspirin 81 MG EC tablet Take 1 Tablet by mouth every day.      Calcium Citrate-Vitamin D 200-125 MG-UNIT Tab       rosuvastatin (CRESTOR) 10 MG Tab       fexofenadine (ALLEGRA) 180 MG tablet Take 1 Tablet by mouth every day.       No current facility-administered medications for this visit.     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), Aortic regurgitation (10/4/2012), ASTHMA, Breast cancer ([facility]) (8/8/2012), Bronchitis, Cancer ([facility]), Carotid artery plaque (10/4/2012), Chickenpox, Depression (8/8/2012), Diabetes, Diabetes mellitus type 2 in nonobese ([facility]) (8/8/2012), GERD (gastroesophageal reflux disease), [place] measles, Glaucoma, History of colonoscopy (8/8/2012), History of mammogram (8/8/2012), Hyperlipidemia (8/8/2012), Hypertension (8/8/2012), Hypothyroidism (8/8/2012), Influenza, Mumps, Nasal drainage, Obesity, Rosacea, Thyroid disease, and Whooping cough.    ROS  No chest pain, no shortness of breath, no abdominal pain       Objective:     /60 (BP Location: Right arm, Patient Position: Sitting, BP Cuff Size: Adult long)   Pulse 78   Temp 36.2 °C (97.1 °F) (Temporal)   Resp 16   Ht 1.6 m (5' 3\")   Wt 71.7 kg (158 lb)   SpO2 93%  Body mass index is 27.99 kg/m².   Physical Exam:  Constitutional: Alert, no distress.  Skin: Warm, dry, good turgor, " no rashes in visible areas.  Eye: Equal, round and reactive, conjunctiva clear, lids normal.  ENMT: Lips without lesions, good dentition, oropharynx clear.  Neck: Trachea midline, no masses, no thyromegaly. No cervical or supraclavicular lymphadenopathy  Respiratory: Unlabored respiratory effort, lungs clear to auscultation, no wheezes, no ronchi.  Cardiovascular: Normal S1, S2, no murmur, no edema.  Abdomen: Soft, non-tender, no masses, no hepatosplenomegaly.  Psych: Alert and oriented x3, normal affect and mood.    Assessment and Plan:   The following treatment plan was discussed    1. Diabetes mellitus type 2 in nonobese (HCC)  - POCT Microalbumin Creat Ratio Urine; Future  - Basic Metabolic Panel; Future    2. Hyperlipidemia, unspecified hyperlipidemia type  - Lipid Profile; Future  - Basic Metabolic Panel; Future    Other orders  - hydroCHLOROthiazide 12.5 MG tablet; Take 1 Tablet by mouth every day.  Dispense: 90 Tablet; Refill: 3  Stop amlodipine    Followup: Return in about 2 months (around 10/14/2024) for Hypertension, Lab Review.

## 2024-08-22 ENCOUNTER — TELEPHONE (OUTPATIENT)
Dept: MEDICAL GROUP | Facility: PHYSICIAN GROUP | Age: 89
End: 2024-08-22
Payer: MEDICARE

## 2024-08-22 NOTE — TELEPHONE ENCOUNTER
Patient called 08/22/2024 stating her Rx for the hydroCHLOROthiazide 12.5 MG was cancelled because the pharmacy needs a reason why she was taken off the other medication and switched to this Please call them at [phone]. Please advise. Thank you!

## 2024-09-03 DIAGNOSIS — J45.20 MILD INTERMITTENT ASTHMA WITHOUT COMPLICATION: ICD-10-CM

## 2024-09-03 RX ORDER — ALBUTEROL SULFATE 90 UG/1
INHALANT RESPIRATORY (INHALATION)
Qty: 40.2 G | Refills: 3 | Status: SHIPPED | OUTPATIENT
Start: 2024-09-03

## 2024-09-03 NOTE — TELEPHONE ENCOUNTER
Have we ever prescribed this med? Yes.  If yes, what date? 08/30/23    Last OV: 03/20/24 with Dr Murray     Next OV: 10/09/24 with Dr Murray    DX: asthma    Medications:   Requested Prescriptions     Pending Prescriptions Disp Refills    albuterol 108 (90 Base) MCG/ACT Aero Soln inhalation aerosol [Pharmacy Med Name: ALBUTEROL HFA 90MCG/ACT (PV)] 40.2 g 3     Sig: USE 1 TO 2 INHALATIONS BY MOUTH  EVERY 4 HOURS AS NEEDED FOR  SHORTNESS OF BREATH

## 2024-09-05 ENCOUNTER — TELEPHONE (OUTPATIENT)
Dept: MEDICAL GROUP | Facility: PHYSICIAN GROUP | Age: 89
End: 2024-09-05
Payer: MEDICARE

## 2024-09-11 ENCOUNTER — OFFICE VISIT (OUTPATIENT)
Dept: MEDICAL GROUP | Facility: PHYSICIAN GROUP | Age: 89
End: 2024-09-11
Payer: MEDICARE

## 2024-09-11 VITALS
BODY MASS INDEX: 28.35 KG/M2 | HEART RATE: 84 BPM | RESPIRATION RATE: 14 BRPM | TEMPERATURE: 97.8 F | WEIGHT: 160 LBS | DIASTOLIC BLOOD PRESSURE: 62 MMHG | SYSTOLIC BLOOD PRESSURE: 116 MMHG | HEIGHT: 63 IN | OXYGEN SATURATION: 92 %

## 2024-09-11 DIAGNOSIS — Z91.81 AT HIGH RISK FOR FALLS: ICD-10-CM
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
## 2024-09-11 DIAGNOSIS — D64.9 ANEMIA, UNSPECIFIED TYPE: ICD-10-CM

## 2024-09-11 PROCEDURE — 3078F DIAST BP <80 MM HG: CPT | Performed by: FAMILY MEDICINE

## 2024-09-11 PROCEDURE — 99214 OFFICE O/P EST MOD 30 MIN: CPT | Performed by: FAMILY MEDICINE

## 2024-09-11 PROCEDURE — 3074F SYST BP LT 130 MM HG: CPT | Performed by: FAMILY MEDICINE

## 2024-09-11 RX ORDER — HYDROCHLOROTHIAZIDE 12.5 MG/1
12.5 CAPSULE ORAL DAILY
COMMUNITY
End: 2024-09-11

## 2024-09-11 ASSESSMENT — FIBROSIS 4 INDEX: FIB4 SCORE: 3.23

## 2024-09-11 NOTE — ASSESSMENT & PLAN NOTE
Patient was outside her eye doctors and using her cane and fell needing stitches over right eye and right palm.   She is recovering well.   On asa 81 mg,   Will place PT referral for reducing her fall risk and assess for walker.

## 2024-09-15 ENCOUNTER — HOSPITAL ENCOUNTER (OUTPATIENT)
Facility: MEDICAL CENTER | Age: 89
End: 2024-09-15
Attending: FAMILY MEDICINE
Payer: MEDICARE

## 2024-09-15 PROCEDURE — 82274 ASSAY TEST FOR BLOOD FECAL: CPT

## 2024-09-16 ENCOUNTER — HOSPITAL ENCOUNTER (OUTPATIENT)
Dept: LAB | Facility: MEDICAL CENTER | Age: 89
End: 2024-09-16
Attending: FAMILY MEDICINE
Payer: MEDICARE

## 2024-09-16 ENCOUNTER — OFFICE VISIT (OUTPATIENT)
Dept: URGENT CARE | Facility: PHYSICIAN GROUP | Age: 89
End: 2024-09-16
Payer: MEDICARE

## 2024-09-16 VITALS — SYSTOLIC BLOOD PRESSURE: 128 MMHG | HEART RATE: 78 BPM | TEMPERATURE: 96.8 F | DIASTOLIC BLOOD PRESSURE: 62 MMHG

## 2024-09-16 DIAGNOSIS — S01.111D EYEBROW LACERATION, RIGHT, SUBSEQUENT ENCOUNTER: ICD-10-CM

## 2024-09-16 DIAGNOSIS — S61.411D LACERATION OF RIGHT HAND WITHOUT FOREIGN BODY, SUBSEQUENT ENCOUNTER: ICD-10-CM

## 2024-09-16 DIAGNOSIS — E11.9 DIABETES MELLITUS TYPE 2 IN NONOBESE (HCC): ICD-10-CM

## 2024-09-16 DIAGNOSIS — N39.41 URGE INCONTINENCE: ICD-10-CM

## 2024-09-16 DIAGNOSIS — E78.5 HYPERLIPIDEMIA, UNSPECIFIED HYPERLIPIDEMIA TYPE: ICD-10-CM

## 2024-09-16 DIAGNOSIS — D64.9 ANEMIA, UNSPECIFIED TYPE: ICD-10-CM

## 2024-09-16 DIAGNOSIS — R94.4 DECREASED GFR: ICD-10-CM

## 2024-09-16 LAB
ALBUMIN SERPL BCP-MCNC: 4 G/DL (ref 3.2–4.9)
ANION GAP SERPL CALC-SCNC: 10 MMOL/L (ref 7–16)
APPEARANCE UR: CLEAR
BACTERIA #/AREA URNS HPF: NEGATIVE /HPF
BILIRUB UR QL STRIP.AUTO: NEGATIVE
BUN SERPL-MCNC: 24 MG/DL (ref 8–22)
CALCIUM ALBUM COR SERPL-MCNC: 9 MG/DL (ref 8.5–10.5)
CALCIUM SERPL-MCNC: 9 MG/DL (ref 8.5–10.5)
CHLORIDE SERPL-SCNC: 106 MMOL/L (ref 96–112)
CHOLEST SERPL-MCNC: 152 MG/DL (ref 100–199)
CO2 SERPL-SCNC: 25 MMOL/L (ref 20–33)
COLOR UR: YELLOW
CREAT SERPL-MCNC: 1.11 MG/DL (ref 0.5–1.4)
CREAT UR-MCNC: 42.85 MG/DL
EPI CELLS #/AREA URNS HPF: NEGATIVE /HPF
ERYTHROCYTE [DISTWIDTH] IN BLOOD BY AUTOMATED COUNT: 50.6 FL (ref 35.9–50)
FASTING STATUS PATIENT QL REPORTED: NORMAL
FOLATE SERPL-MCNC: 7.4 NG/ML
GFR SERPLBLD CREATININE-BSD FMLA CKD-EPI: 46 ML/MIN/1.73 M 2
GLUCOSE SERPL-MCNC: 96 MG/DL (ref 65–99)
GLUCOSE UR STRIP.AUTO-MCNC: NEGATIVE MG/DL
HCT VFR BLD AUTO: 35.1 % (ref 37–47)
HDLC SERPL-MCNC: 71 MG/DL
HGB BLD-MCNC: 11.6 G/DL (ref 12–16)
HYALINE CASTS #/AREA URNS LPF: NORMAL /LPF
IRON SATN MFR SERPL: 26 % (ref 15–55)
IRON SERPL-MCNC: 98 UG/DL (ref 40–170)
KETONES UR STRIP.AUTO-MCNC: NEGATIVE MG/DL
LDLC SERPL CALC-MCNC: 64 MG/DL
LEUKOCYTE ESTERASE UR QL STRIP.AUTO: ABNORMAL
MCH RBC QN AUTO: 32.2 PG (ref 27–33)
MCHC RBC AUTO-ENTMCNC: 33 G/DL (ref 32.2–35.5)
MCV RBC AUTO: 97.5 FL (ref 81.4–97.8)
MICRO URNS: ABNORMAL
MICROALBUMIN UR-MCNC: 5.3 MG/DL
MICROALBUMIN/CREAT UR: 124 MG/G (ref 0–30)
NITRITE UR QL STRIP.AUTO: NEGATIVE
PH UR STRIP.AUTO: 7 [PH] (ref 5–8)
PHOSPHATE SERPL-MCNC: 2.9 MG/DL (ref 2.5–4.5)
PLATELET # BLD AUTO: 218 K/UL (ref 164–446)
PMV BLD AUTO: 11.1 FL (ref 9–12.9)
POTASSIUM SERPL-SCNC: 4.2 MMOL/L (ref 3.6–5.5)
PROT UR QL STRIP: NEGATIVE MG/DL
RBC # BLD AUTO: 3.6 M/UL (ref 4.2–5.4)
RBC # URNS HPF: NORMAL /HPF
RBC UR QL AUTO: NEGATIVE
SODIUM SERPL-SCNC: 141 MMOL/L (ref 135–145)
SP GR UR STRIP.AUTO: 1.01
TIBC SERPL-MCNC: 371 UG/DL (ref 250–450)
TRIGL SERPL-MCNC: 85 MG/DL (ref 0–149)
UIBC SERPL-MCNC: 273 UG/DL (ref 110–370)
UROBILINOGEN UR STRIP.AUTO-MCNC: 0.2 MG/DL
VIT B12 SERPL-MCNC: 357 PG/ML (ref 211–911)
WBC # BLD AUTO: 4.7 K/UL (ref 4.8–10.8)
WBC #/AREA URNS HPF: NORMAL /HPF

## 2024-09-16 PROCEDURE — 81001 URINALYSIS AUTO W/SCOPE: CPT

## 2024-09-16 PROCEDURE — 83540 ASSAY OF IRON: CPT

## 2024-09-16 PROCEDURE — 85027 COMPLETE CBC AUTOMATED: CPT

## 2024-09-16 PROCEDURE — 80061 LIPID PANEL: CPT

## 2024-09-16 PROCEDURE — 82043 UR ALBUMIN QUANTITATIVE: CPT

## 2024-09-16 PROCEDURE — 36415 COLL VENOUS BLD VENIPUNCTURE: CPT

## 2024-09-16 PROCEDURE — 3078F DIAST BP <80 MM HG: CPT | Performed by: FAMILY MEDICINE

## 2024-09-16 PROCEDURE — 3074F SYST BP LT 130 MM HG: CPT | Performed by: FAMILY MEDICINE

## 2024-09-16 PROCEDURE — 15853 REMOVAL SUTR/STAPL XREQ ANES: CPT | Performed by: FAMILY MEDICINE

## 2024-09-16 PROCEDURE — 82607 VITAMIN B-12: CPT

## 2024-09-16 PROCEDURE — 83550 IRON BINDING TEST: CPT

## 2024-09-16 PROCEDURE — 82570 ASSAY OF URINE CREATININE: CPT

## 2024-09-16 PROCEDURE — 80069 RENAL FUNCTION PANEL: CPT

## 2024-09-16 PROCEDURE — 99212 OFFICE O/P EST SF 10 MIN: CPT | Performed by: FAMILY MEDICINE

## 2024-09-16 PROCEDURE — 82746 ASSAY OF FOLIC ACID SERUM: CPT

## 2024-09-16 ASSESSMENT — ENCOUNTER SYMPTOMS
FEVER: 0
TINGLING: 0
FOCAL WEAKNESS: 0
SENSORY CHANGE: 0

## 2024-09-16 NOTE — PROGRESS NOTES
Subjective:   Amada Xavier is a 89 y.o. female who presents for Suture / Staple Removal (Went to ER and got 4 stitches R brow and 4 stiches on R hand, was told to come in and get them removed.)        Suture / Staple Removal  Treated in ED: 1 week prior reports fall on the sidewalk, initial wound closure performed in emergency department. There has been no drainage from the wound. There is no redness present. There is no swelling present. There is no pain present. She has no difficulty moving the affected extremity or digit.     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), Aortic regurgitation (10/4/2012), ASTHMA, Breast cancer ([facility]) (8/8/2012), Bronchitis, Cancer ([facility]), Carotid artery plaque (10/4/2012), Chickenpox, Depression (8/8/2012), Diabetes, Diabetes mellitus type 2 in nonobese ([facility]) (8/8/2012), GERD (gastroesophageal reflux disease), [place]ij[place] measles, Glaucoma, History of colonoscopy (8/8/2012), History of mammogram (8/8/2012), Hyperlipidemia (8/8/2012), Hypertension (8/8/2012), Hypothyroidism (8/8/2012), Influenza, Mumps, Nasal drainage, Obesity, Rosacea, Thyroid disease, and Whooping cough.  MEDS:   Current Outpatient Medications:     albuterol 108 (90 Base) MCG/ACT Aero Soln inhalation aerosol, USE 1 TO 2 INHALATIONS BY MOUTH  EVERY 4 HOURS AS NEEDED FOR  SHORTNESS OF BREATH, Disp: 40.2 g, Rfl: 3    levothyroxine (SYNTHROID) 75 MCG Tab, TAKE 1 TABLET BY MOUTH IN THE  MORNING ON AN EMPTY STOMACH, Disp: 90 Tablet, Rfl: 3    escitalopram (LEXAPRO) 10 MG Tab, TAKE 1 TABLET BY MOUTH DAILY, Disp: 90 Tablet, Rfl: 3    famotidine (PEPCID) 10 MG tablet, Take 10 mg by mouth., Disp: , Rfl:     montelukast (SINGULAIR) 10 MG Tab, TAKE 1 TABLET BY MOUTH DAILY, Disp: 90 Tablet, Rfl: 3    vitamin D (SM VITAMIN D3) 1000 UNIT Tab, Take 1,000 Units by mouth., Disp: , Rfl:     metFORMIN ER (GLUCOPHAGE XR) 500 MG TABLET SR 24 HR, TAKE 1  TABLET BY MOUTH TWICE  DAILY, Disp: 180 Tablet, Rfl: 3    Blood Glucose Test Strips, Test strips order: Test strips for insurance to approve meter. Sig: Use 1 test strip daily to check blood sugars one to three times a day#300 RF x 3, Disp: 300 Strip, Rfl: 3    Blood Glucose Monitoring Suppl Device, Meter: Dispense Device of Insurance Preference. Sig. Use as directed for blood sugar monitoring. #1. NR., Disp: 1 Each, Rfl: 0    Lancet Device Misc, Check blood sugars fasting and after meals daily #300 with 3 refills, Disp: 300 Each, Rfl: 3    Lancets, Lancets order: Lancets for insurance approved meter. Sig: Use 1 lancet once daily to check blood sugar #100 RF x 3, Disp: 100 Each, Rfl: 3    losartan (COZAAR) 100 MG Tab, Take 1 Tablet by mouth every day., Disp: , Rfl:     fenofibrate (TRIGLIDE) 160 MG tablet, Take 1 Tablet by mouth every day., Disp: , Rfl:     aspirin 81 MG EC tablet, Take 1 Tablet by mouth every day., Disp: , Rfl:     Calcium Citrate-Vitamin D 200-125 MG-UNIT Tab, , Disp: , Rfl:     rosuvastatin (CRESTOR) 10 MG Tab, , Disp: , Rfl:     fexofenadine (ALLEGRA) 180 MG tablet, Take 1 Tablet by mouth every day., Disp: , Rfl:   ALLERGIES:   Allergies   Allergen Reactions    Atorvastatin     Bacitracin     Clarithromycin Hives    Linaclotide Diarrhea and Hives     Severe diarrhea  Severe diarrhea    Pcn [Penicillins]     Sulfa Drugs      SURGHX:   Past Surgical History:   Procedure Laterality Date    CATARACT EXTRACTION WITH IOL      EYE SURGERY      LUMPECTOMY      MASTECTOMY      left    MASTECTOMY      CA REMV 2ND CATARACT,CORN-SCLER SECTN       SOCHX:  reports that she has never smoked. She has never used smokeless tobacco. She reports that she does not drink alcohol and does not use drugs.  FH:   Family History   Problem Relation Age of Onset    Heart Disease Mother          age 88, CAD    GI Disease Father         cirrhosis, CHF    Heart Disease Father     Heart Failure Father     Other Father       Review of Systems   Constitutional:  Negative for fever.   Neurological:  Negative for tingling, sensory change and focal weakness.        Objective:   /62   Pulse 78   Temp 36 °C (96.8 °F) (Temporal)   Physical Exam  Vitals and nursing note reviewed.   Constitutional:       General: She is not in acute distress.     Appearance: She is well-developed.   HENT:      Head: Normocephalic and atraumatic.        Right Ear: External ear normal.      Left Ear: External ear normal.      Nose: Nose normal.      Mouth/Throat:      Mouth: Mucous membranes are moist.   Eyes:      Conjunctiva/sclera: Conjunctivae normal.   Cardiovascular:      Rate and Rhythm: Normal rate.   Pulmonary:      Effort: Pulmonary effort is normal. No respiratory distress.      Breath sounds: Normal breath sounds.   Abdominal:      General: There is no distension.   Musculoskeletal:         General: Normal range of motion.        Hands:    Skin:     General: Skin is warm and dry.   Neurological:      General: No focal deficit present.      Mental Status: She is alert and oriented to person, place, and time. Mental status is at baseline.      Gait: Gait (gait at baseline) normal.   Psychiatric:         Judgment: Judgment normal.           Assessment/Plan:   1. Eyebrow laceration, right, subsequent encounter    2. Laceration of right hand without foreign body, subsequent encounter        Medical Decision Making/Course:  In the course of preparing for this visit with review of the pertinent past medical history, recent and past clinic visits, current medications, and performing chart, immunization, medical history and medication reconciliation, and in the further course of obtaining the current history pertinent to the clinic visit today, performing an exam and evaluation, ordering and independently evaluating labs, tests  , and/or procedures, prescribing any recommended new medications as noted above, providing any pertinent counseling and  education and recommending further coordination of care including recommendations for symptomatic and supportive measures, wound care instructions and precautions, at least  12 minutes of total time were spent during this encounter.      Discussed close monitoring, return precautions, and supportive measures of maintaining adequate fluid hydration and caloric intake, relative rest and symptom management as needed for pain and/or fever.    Differential diagnosis, natural history, supportive care, and indications for immediate follow-up discussed.     Advised the patient to follow-up with the primary care physician for recheck, reevaluation, and consideration of further management.    Please note that this dictation was created using voice recognition software. I have worked with consultants from the vendor as well as technical experts from [employer] to optimize the interface. I have made every reasonable attempt to correct obvious errors, but I expect that there are errors of grammar and possibly content that I did not discover before finalizing the note.

## 2024-09-16 NOTE — PROCEDURES
Suture Removal    Date/Time: 9/16/2024 10:30 AM    Performed by: Ryan Claros M.D.  Authorized by: Ryan Claros M.D.  Body area: head/neck  Location details: right eyebrow  Sutures Removed: 4  Patient tolerance: patient tolerated the procedure well with no immediate complications      Suture Removal    Date/Time: 9/16/2024 10:31 AM    Performed by: Ryan Claros M.D.  Authorized by: Ryan Claros M.D.  Body area: upper extremity  Location details: right hand  Wound Appearance: clean  Sutures Removed: 4  Post-removal: dressing applied  Patient tolerance: patient tolerated the procedure well with no immediate complications

## 2024-09-17 DIAGNOSIS — D64.9 ANEMIA, UNSPECIFIED TYPE: ICD-10-CM

## 2024-09-17 DIAGNOSIS — R63.4 WEIGHT LOSS: ICD-10-CM

## 2024-09-17 LAB — AMBIGUOUS SPECIMEN AMBIS: NORMAL

## 2024-09-18 LAB — IMM ASSAY OCC BLD FITOB: NEGATIVE

## 2024-09-20 NOTE — PROGRESS NOTES
Subjective:   Amada Xavier is a 89 y.o. female here today for evaluation and management of:     Fall  Patient was outside her eye doctors and using her cane and fell needing stitches over right eye and right palm.   She is recovering well.   On asa 81 mg,   Will place PT referral for reducing her fall risk and assess for walker.          Current medicines (including changes today)  Current Outpatient Medications   Medication Sig Dispense Refill    albuterol 108 (90 Base) MCG/ACT Aero Soln inhalation aerosol USE 1 TO 2 INHALATIONS BY MOUTH  EVERY 4 HOURS AS NEEDED FOR  SHORTNESS OF BREATH 40.2 g 3    levothyroxine (SYNTHROID) 75 MCG Tab TAKE 1 TABLET BY MOUTH IN THE  MORNING ON AN EMPTY STOMACH 90 Tablet 3    escitalopram (LEXAPRO) 10 MG Tab TAKE 1 TABLET BY MOUTH DAILY 90 Tablet 3    famotidine (PEPCID) 10 MG tablet Take 10 mg by mouth.      montelukast (SINGULAIR) 10 MG Tab TAKE 1 TABLET BY MOUTH DAILY 90 Tablet 3    vitamin D (SM VITAMIN D3) 1000 UNIT Tab Take 1,000 Units by mouth.      metFORMIN ER (GLUCOPHAGE XR) 500 MG TABLET SR 24 HR TAKE 1 TABLET BY MOUTH TWICE  DAILY 180 Tablet 3    Blood Glucose Test Strips Test strips order: Test strips for insurance to approve meter. Sig: Use 1 test strip daily to check blood sugars one to three times a day#300 RF x 3 300 Strip 3    Blood Glucose Monitoring Suppl Device Meter: Dispense Device of Insurance Preference. Sig. Use as directed for blood sugar monitoring. #1. NR. 1 Each 0    Lancet Device [facility] Check blood sugars fasting and after meals daily #300 with 3 refills 300 Each 3    Lancets Lancets order: Lancets for insurance approved meter. Sig: Use 1 lancet once daily to check blood sugar #100 RF x 3 100 Each 3    losartan (COZAAR) 100 MG Tab Take 1 Tablet by mouth every day.      fenofibrate (TRIGLIDE) 160 MG tablet Take 1 Tablet by mouth every day.      aspirin 81 MG EC tablet Take 1 Tablet by mouth every day.      Calcium Citrate-Vitamin D 200-125  "MG-UNIT Tab       rosuvastatin (CRESTOR) 10 MG Tab       fexofenadine (ALLEGRA) 180 MG tablet Take 1 Tablet by mouth every day.       No current facility-administered medications for this visit.     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    ROS  No chest pain, no shortness of breath, no abdominal pain       Objective:     /62   Pulse 84   Temp 36.6 °C (97.8 °F) (Temporal)   Resp 14   Ht 1.6 m (5' 3\")   Wt 72.6 kg (160 lb)   SpO2 92%  Body mass index is 28.34 kg/m².   Physical Exam:  Constitutional: Alert, no distress.  Skin: Warm, dry, good turgor, no rashes in visible areas.  Eye: Equal, round and reactive, conjunctiva clear, lids normal.  ENMT: Lips without lesions, good dentition, oropharynx clear.  Neck: Trachea midline, no masses, no thyromegaly. No cervical or supraclavicular lymphadenopathy  Respiratory: Unlabored respiratory effort, lungs clear to auscultation, no wheezes, no ronchi.  Cardiovascular: Normal S1, S2, no murmur, no edema.  Abdomen: Soft, non-tender, no masses, no hepatosplenomegaly.  Psych: Alert and oriented x3, normal affect and mood.    Assessment and Plan:   The following treatment plan was discussed    1. Fall, subsequent encounter  - Referral to Home Health    2. At high risk for falls  - Referral to Home Health    3. Anemia, unspecified type  - CBC WITHOUT DIFFERENTIAL; " Future  - IRON/TOTAL IRON BIND; Future  - VITAMIN B12; Future  - FOLATE; Future      Followup: No follow-ups on file.

## 2024-10-02 ENCOUNTER — OFFICE VISIT (OUTPATIENT)
Dept: SLEEP MEDICINE | Facility: MEDICAL CENTER | Age: 89
End: 2024-10-02
Attending: INTERNAL MEDICINE
Payer: MEDICARE

## 2024-10-02 VITALS
WEIGHT: 157 LBS | HEIGHT: 63 IN | HEART RATE: 73 BPM | SYSTOLIC BLOOD PRESSURE: 122 MMHG | BODY MASS INDEX: 27.82 KG/M2 | OXYGEN SATURATION: 93 % | DIASTOLIC BLOOD PRESSURE: 54 MMHG

## 2024-10-02 DIAGNOSIS — Z23 ENCOUNTER FOR IMMUNIZATION: ICD-10-CM

## 2024-10-02 DIAGNOSIS — Z23 NEED FOR VACCINATION: ICD-10-CM

## 2024-10-02 DIAGNOSIS — J45.20 MILD INTERMITTENT ASTHMA WITHOUT COMPLICATION: ICD-10-CM

## 2024-10-02 PROCEDURE — 3078F DIAST BP <80 MM HG: CPT | Performed by: INTERNAL MEDICINE

## 2024-10-02 PROCEDURE — 99214 OFFICE O/P EST MOD 30 MIN: CPT | Performed by: INTERNAL MEDICINE

## 2024-10-02 PROCEDURE — 3074F SYST BP LT 130 MM HG: CPT | Performed by: INTERNAL MEDICINE

## 2024-10-02 PROCEDURE — 90471 IMMUNIZATION ADMIN: CPT | Performed by: INTERNAL MEDICINE

## 2024-10-02 PROCEDURE — 99213 OFFICE O/P EST LOW 20 MIN: CPT | Performed by: INTERNAL MEDICINE

## 2024-10-02 RX ORDER — HYDROCHLOROTHIAZIDE 12.5 MG/1
12.5 CAPSULE ORAL DAILY
COMMUNITY

## 2024-10-02 ASSESSMENT — ENCOUNTER SYMPTOMS
WHEEZING: 0
BACK PAIN: 0
HEARTBURN: 0
VOMITING: 0
SPUTUM PRODUCTION: 0
EYE DISCHARGE: 0
TREMORS: 0
MYALGIAS: 0
SORE THROAT: 0
FOCAL WEAKNESS: 0
HEMOPTYSIS: 0
EYE REDNESS: 0
BLURRED VISION: 0
PALPITATIONS: 0
PHOTOPHOBIA: 0
SHORTNESS OF BREATH: 0
DIARRHEA: 0
COUGH: 1
PND: 0
SPEECH CHANGE: 0
ABDOMINAL PAIN: 0
FEVER: 0
CLAUDICATION: 0
CONSTIPATION: 0
CHILLS: 0
EYE PAIN: 0
HEADACHES: 0
NECK PAIN: 0
WEIGHT LOSS: 0
DIZZINESS: 0
DEPRESSION: 0
WEAKNESS: 0
ORTHOPNEA: 0
STRIDOR: 0
NAUSEA: 0
DOUBLE VISION: 0
DIAPHORESIS: 0
SINUS PAIN: 0
FALLS: 0

## 2024-10-02 ASSESSMENT — FIBROSIS 4 INDEX: FIB4 SCORE: 2.54

## 2024-10-09 ENCOUNTER — APPOINTMENT (OUTPATIENT)
Dept: SLEEP MEDICINE | Facility: MEDICAL CENTER | Age: 89
End: 2024-10-09
Attending: INTERNAL MEDICINE
Payer: MEDICARE

## 2024-10-11 ENCOUNTER — OFFICE VISIT (OUTPATIENT)
Dept: MEDICAL GROUP | Facility: PHYSICIAN GROUP | Age: 89
End: 2024-10-11
Payer: MEDICARE

## 2024-10-11 VITALS
DIASTOLIC BLOOD PRESSURE: 70 MMHG | BODY MASS INDEX: 27.64 KG/M2 | WEIGHT: 156 LBS | SYSTOLIC BLOOD PRESSURE: 132 MMHG | HEART RATE: 80 BPM | TEMPERATURE: 97.6 F | RESPIRATION RATE: 16 BRPM | OXYGEN SATURATION: 93 % | HEIGHT: 63 IN

## 2024-10-11 DIAGNOSIS — D64.9 ANEMIA, UNSPECIFIED TYPE: ICD-10-CM

## 2024-10-11 DIAGNOSIS — N18.31 STAGE 3A CHRONIC KIDNEY DISEASE: ICD-10-CM

## 2024-10-11 PROCEDURE — 3078F DIAST BP <80 MM HG: CPT | Performed by: FAMILY MEDICINE

## 2024-10-11 PROCEDURE — 3075F SYST BP GE 130 - 139MM HG: CPT | Performed by: FAMILY MEDICINE

## 2024-10-11 PROCEDURE — 99214 OFFICE O/P EST MOD 30 MIN: CPT | Performed by: FAMILY MEDICINE

## 2024-10-11 ASSESSMENT — FIBROSIS 4 INDEX: FIB4 SCORE: 2.54

## 2025-01-02 ENCOUNTER — TELEPHONE (OUTPATIENT)
Dept: MEDICAL GROUP | Facility: PHYSICIAN GROUP | Age: OVER 89
End: 2025-01-02
Payer: MEDICARE

## 2025-01-02 RX ORDER — HYDROCHLOROTHIAZIDE 12.5 MG/1
12.5 CAPSULE ORAL DAILY
Qty: 90 CAPSULE | Refills: 3 | Status: SHIPPED | OUTPATIENT
Start: 2025-01-02

## 2025-01-02 NOTE — TELEPHONE ENCOUNTER
Received request via: Patient    Was the patient seen in the last year in this department? Yes    Does the patient have an active prescription (recently filled or refills available) for medication(s) requested? No    [facility]    Does the patient have [facility] and need 100-day supply? (This applies to ALL medications) Patient does not have SCP

## 2025-01-09 NOTE — TELEPHONE ENCOUNTER
Received request via: Patient    Was the patient seen in the last year in this department? Yes    Does the patient have an active prescription (recently filled or refills available) for medication(s) requested? No    [facility]    Does the patient have [facility] Plus and need 100-day supply? (This applies to ALL medications) Patient does not have SCP

## 2025-01-23 ENCOUNTER — OFFICE VISIT (OUTPATIENT)
Dept: MEDICAL GROUP | Facility: PHYSICIAN GROUP | Age: OVER 89
End: 2025-01-23
Payer: MEDICARE

## 2025-01-23 VITALS
WEIGHT: 162 LBS | RESPIRATION RATE: 16 BRPM | BODY MASS INDEX: 28.7 KG/M2 | TEMPERATURE: 99 F | DIASTOLIC BLOOD PRESSURE: 64 MMHG | HEART RATE: 88 BPM | HEIGHT: 63 IN | SYSTOLIC BLOOD PRESSURE: 128 MMHG | OXYGEN SATURATION: 92 %

## 2025-01-23 DIAGNOSIS — H35.30 AMD (AGE-RELATED MACULAR DEGENERATION), BILATERAL: ICD-10-CM

## 2025-01-23 DIAGNOSIS — Z90.12 HISTORY OF LEFT MASTECTOMY: ICD-10-CM
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
## 2025-01-23 DIAGNOSIS — Z85.3 HISTORY OF BREAST CANCER: ICD-10-CM

## 2025-01-23 DIAGNOSIS — Z12.31 ENCOUNTER FOR SCREENING MAMMOGRAM FOR MALIGNANT NEOPLASM OF BREAST: ICD-10-CM

## 2025-01-23 DIAGNOSIS — E11.9 DIABETES MELLITUS TYPE 2 IN NONOBESE (HCC): ICD-10-CM

## 2025-01-23 DIAGNOSIS — E55.9 VITAMIN D DEFICIENCY: ICD-10-CM

## 2025-01-23 LAB
HBA1C MFR BLD: 6.3 % (ref ?–5.8)
POCT INT CON NEG: NEGATIVE
POCT INT CON POS: POSITIVE

## 2025-01-23 PROCEDURE — 83036 HEMOGLOBIN GLYCOSYLATED A1C: CPT | Performed by: FAMILY MEDICINE

## 2025-01-23 PROCEDURE — 99214 OFFICE O/P EST MOD 30 MIN: CPT | Performed by: FAMILY MEDICINE

## 2025-01-23 PROCEDURE — 3078F DIAST BP <80 MM HG: CPT | Performed by: FAMILY MEDICINE

## 2025-01-23 PROCEDURE — 3074F SYST BP LT 130 MM HG: CPT | Performed by: FAMILY MEDICINE

## 2025-01-23 ASSESSMENT — FIBROSIS 4 INDEX: FIB4 SCORE: 2.54

## 2025-01-23 ASSESSMENT — PATIENT HEALTH QUESTIONNAIRE - PHQ9: CLINICAL INTERPRETATION OF PHQ2 SCORE: 0

## 2025-01-23 NOTE — ASSESSMENT & PLAN NOTE
Pt had mastectomy of left for breast cancer in 1985  In remission and continues with screening mammograms and breast ulrasound.   Rx for mastectomy bra provided.

## 2025-01-24 NOTE — ASSESSMENT & PLAN NOTE
Sig vision loss, chronic no acute changes.  uses a magnifying glass and light to read large print. Followed closely by her ophthalmologist.

## 2025-01-24 NOTE — ASSESSMENT & PLAN NOTE
Chronic condition, controlled with diet.   Is on asa, statin  She was concerned about mild increase in A1c from 5.7 to 6.5, after stopping metformin ER 500mg/day I reassured her that this is well within an acceptable result  If A1c increased above 7  will restart metformin

## 2025-01-24 NOTE — PROGRESS NOTES
Subjective:   Amada Xavier is a 89 y.o. female here today for evaluation and management of:     History of breast cancer  Pt had mastectomy of left for breast cancer in 1985  In remission and continues with screening mammograms and breast ulrasound.   Rx for mastectomy bra provided.     Cat scratch  Small cat scratch on wrist, a few days ago, mild erythema, no fluctuance, pus or induration.   Continue topical polysporin. Monitor for signs of infection.     Diabetes mellitus type 2 in nonobese  Chronic condition, controlled with diet.   Is on asa, statin  She was concerned about mild increase in A1c from 5.7 to 6.5, after stopping metformin ER 500mg/day I reassured her that this is well within an acceptable result  If A1c increased above 7  will restart metformin    AMD (age-related macular degeneration), bilateral  Sig vision loss, chronic no acute changes.  uses a magnifying glass and light to read large print. Followed closely by her ophthalmologist.          Current medicines (including changes today)  Current Outpatient Medications   Medication Sig Dispense Refill    Misc. Devices Misc Please provide two mastectomy bras. 2 Each 0    hydrochlorothiazide (MICROZIDE) 12.5 MG capsule Take 1 Capsule by mouth every day. 90 Capsule 3    albuterol 108 (90 Base) MCG/ACT Aero Soln inhalation aerosol USE 1 TO 2 INHALATIONS BY MOUTH  EVERY 4 HOURS AS NEEDED FOR  SHORTNESS OF BREATH 40.2 g 3    levothyroxine (SYNTHROID) 75 MCG Tab TAKE 1 TABLET BY MOUTH IN THE  MORNING ON AN EMPTY STOMACH 90 Tablet 3    escitalopram (LEXAPRO) 10 MG Tab TAKE 1 TABLET BY MOUTH DAILY 90 Tablet 3    famotidine (PEPCID) 10 MG tablet Take 10 mg by mouth.      montelukast (SINGULAIR) 10 MG Tab TAKE 1 TABLET BY MOUTH DAILY 90 Tablet 3    vitamin D (SM VITAMIN D3) 1000 UNIT Tab Take 1,000 Units by mouth.      Blood Glucose Test Strips Test strips order: Test strips for insurance to approve meter. Sig: Use 1 test strip daily to check blood  "sugars one to three times a day#300 RF x 3 300 Strip 3    Blood Glucose Monitoring Suppl Device Meter: Dispense Device of Insurance Preference. Sig. Use as directed for blood sugar monitoring. #1. NR. 1 Each 0    Lancet Device [facility] Check blood sugars fasting and after meals daily #300 with 3 refills 300 Each 3    Lancets Lancets order: Lancets for insurance approved meter. Sig: Use 1 lancet once daily to check blood sugar #100 RF x 3 100 Each 3    losartan (COZAAR) 100 MG Tab Take 1 Tablet by mouth every day.      fenofibrate (TRIGLIDE) 160 MG tablet Take 1 Tablet by mouth every day.      aspirin 81 MG EC tablet Take 1 Tablet by mouth every day.      Calcium Citrate-Vitamin D 200-125 MG-UNIT Tab       rosuvastatin (CRESTOR) 10 MG Tab       fexofenadine (ALLEGRA) 180 MG tablet Take 1 Tablet by mouth every day.       No current facility-administered medications for this visit.     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), Aortic regurgitation (10/4/2012), ASTHMA, Breast cancer ([facility]) (8/8/2012), Bronchitis, Cancer ([facility]), Carotid artery plaque (10/4/2012), Chickenpox, Depression (8/8/2012), Diabetes, Diabetes mellitus type 2 in nonobese ([facility]) (8/8/2012), GERD (gastroesophageal reflux disease), Mongolian measles, Glaucoma, History of colonoscopy (8/8/2012), History of mammogram (8/8/2012), Hyperlipidemia (8/8/2012), Hypertension (8/8/2012), Hypothyroidism (8/8/2012), Influenza, Mumps, Nasal drainage, Obesity, Rosacea, Thyroid disease, and Whooping cough.    ROS  No chest pain, no shortness of breath, no abdominal pain       Objective:     /64 (BP Location: Right arm, Patient Position: Sitting, BP Cuff Size: Adult)   Pulse 88   Temp 37.2 °C (99 °F) (Temporal)   Resp 16   Ht 1.6 m (5' 3\")   Wt 73.5 kg (162 lb)   SpO2 92%  Body mass index is 28.7 kg/m².   Physical Exam:  Constitutional: Alert, no distress.  Skin: Warm, dry, good turgor, no " rashes in visible areas.  Eye: Equal, round and reactive, conjunctiva clear, lids normal.  ENMT: Lips without lesions, good dentition, oropharynx clear.  Neck: Trachea midline, no masses, no thyromegaly. No cervical or supraclavicular lymphadenopathy  Respiratory: Unlabored respiratory effort, lungs clear to auscultation, no wheezes, no ronchi.  Cardiovascular: Normal S1, S2, no murmur, no edema.  Abdomen: Soft, non-tender, no masses, no hepatosplenomegaly.  Psych: Alert and oriented x3, normal affect and mood.    Assessment and Plan:   The following treatment plan was discussed    1. Diabetes mellitus type 2 in nonobese (HCC)  - POCT A1C  - Comp Metabolic Panel; Future  - Lipid Profile; Future  - HEMOGLOBIN A1C; Future  - MICROALBUMIN CREAT RATIO URINE; Future    2. History of left mastectomy  - Misc. Devices Misc; Please provide two mastectomy bras.  Dispense: 2 Each; Refill: 0  - MA-SCREENING MAMMO BILAT W/TOMOSYNTHESIS W/CAD; Future    3. History of breast cancer  - US-SCREENING WHOLE BREAST BILATERAL (3D SCREENING); Future  - MA-SCREENING MAMMO BILAT W/TOMOSYNTHESIS W/CAD; Future    4. Encounter for screening mammogram for malignant neoplasm of breast  - US-SCREENING WHOLE BREAST BILATERAL (3D SCREENING); Future  - MA-SCREENING MAMMO BILAT W/TOMOSYNTHESIS W/CAD; Future    5. Vitamin D deficiency  - VITAMIN D,25 HYDROXY (DEFICIENCY); Future    6. Cat scratch    7. AMD (age-related macular degeneration), bilateral      Followup: Return in about 6 months (around 7/23/2025) for AWV, pls fax mammogram and breast US to [facility].

## 2025-01-24 NOTE — ASSESSMENT & PLAN NOTE
Small cat scratch on wrist, a few days ago, mild erythema, no fluctuance, pus or induration.   Continue topical polysporin. Monitor for signs of infection.

## 2025-02-06 ENCOUNTER — OFFICE VISIT (OUTPATIENT)
Dept: URGENT CARE | Facility: PHYSICIAN GROUP | Age: OVER 89
End: 2025-02-06
Payer: MEDICARE

## 2025-02-06 VITALS
WEIGHT: 162.1 LBS | SYSTOLIC BLOOD PRESSURE: 134 MMHG | TEMPERATURE: 98.2 F | HEART RATE: 78 BPM | OXYGEN SATURATION: 95 % | BODY MASS INDEX: 28.72 KG/M2 | HEIGHT: 63 IN | RESPIRATION RATE: 16 BRPM | DIASTOLIC BLOOD PRESSURE: 60 MMHG

## 2025-02-06 DIAGNOSIS — L85.3 DRY SKIN DERMATITIS: ICD-10-CM

## 2025-02-06 DIAGNOSIS — M25.9: ICD-10-CM

## 2025-02-06 PROCEDURE — 99213 OFFICE O/P EST LOW 20 MIN: CPT

## 2025-02-06 PROCEDURE — 3078F DIAST BP <80 MM HG: CPT

## 2025-02-06 PROCEDURE — 3075F SYST BP GE 130 - 139MM HG: CPT

## 2025-02-06 ASSESSMENT — ENCOUNTER SYMPTOMS: ROS SKIN COMMENTS: DRY SKIN

## 2025-02-06 ASSESSMENT — FIBROSIS 4 INDEX: FIB4 SCORE: 2.54

## 2025-02-06 NOTE — PROGRESS NOTES
"Subjective:   Amada Xvaier is a 89 y.o. female who presents for Wrist Problem (Right wrist bump- clicking. Broke it five years ago never healed normally. )      HPI  Patient states that years ago she broke her right wrist, it healed normally however there was a small bump.  Patient presents today with questions on how bones heal and if the bump is normal.  Patient denies any pain or decreased range of motion.     Patient also has questions about her dry skin to her hands.    Review of Systems   Musculoskeletal:         Bump to right wrist   Skin:         Dry skin   All other systems reviewed and are negative.      Medical History:  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    Aortic regurgitation 10/4/2012    ASTHMA     Breast cancer ([facility]) 8/8/2012    Bronchitis     Cancer ([facility])     Carotid artery plaque 10/4/2012    Chickenpox     Depression 8/8/2012    Diabetes     Diabetes mellitus type 2 in nonobese ([facility]) 8/8/2012    GERD (gastroesophageal reflux disease)     Indian measles     Glaucoma     History of colonoscopy 8/8/2012    History of mammogram 8/8/2012    Hyperlipidemia 8/8/2012    Hypertension 8/8/2012    Hypothyroidism 8/8/2012    Influenza     Mumps     Nasal drainage     Obesity     Rosacea     Thyroid disease     Whooping cough        Allergies:  Allergies   Allergen Reactions    Atorvastatin     Bacitracin     Clarithromycin Hives    Linaclotide Diarrhea and Hives     Severe diarrhea  Severe diarrhea    Pcn [Penicillins]     Sulfa Drugs        Social history, surgical history, medications, and current problem list reviewed today in Epic.       Objective:     /60   Pulse 78   Temp 36.8 °C (98.2 °F) (Temporal)   Resp 16   Ht 1.6 m (5' 3\")   Wt 73.5 kg (162 lb 1.6 oz)   SpO2 95%     Physical Exam  Vitals reviewed.   Constitutional:       General: She is not in acute distress.     Appearance: Normal " appearance. She is not ill-appearing, toxic-appearing or diaphoretic.   Cardiovascular:      Rate and Rhythm: Normal rate.      Pulses: Normal pulses.   Pulmonary:      Effort: Pulmonary effort is normal.   Musculoskeletal:         General: Normal range of motion.      Right wrist: Normal.      Left wrist: Normal.        Arms:       Cervical back: Normal range of motion.      Comments: Bump to right wrist   Skin:     General: Skin is warm and dry.      Capillary Refill: Capillary refill takes less than 2 seconds.   Neurological:      General: No focal deficit present.      Mental Status: She is alert and oriented to person, place, and time.   Psychiatric:         Mood and Affect: Mood normal.         Behavior: Behavior normal.         Assessment/Plan:       Diagnosis and associated orders:     1. Complaint about wrist joint    2. Dry skin dermatitis     Comments/MDM:   I personally reviewed prior external notes and test results pertinent to today's visit as well as additional imaging and testing completed in clinic today.     Very pleasant 89-year-old afebrile, hemodynamically stable, generally well-appearing female presenting with questions on how bones heal and treating dry skin.  Patient states 5 years ago she broke her right wrist, it healed normally however she noticed a small bump over the healed fracture.  I explained how the body heals fractures and answered all of patient's questions.  No concern for neurovascular compromise, no pain, no decreased range of motion.  Patient also had questions about healing dry skin at home.  Told patient to refrain from applying Polysporin to dry skin and to apply Vaseline.  Patient verbalized understanding.  No concern for localized infection to dry skin areas.      Patient is clinically stable at today's acute urgent care visit. Vital signs are normal and reassuring.  No acute distress noted. Appropriate for outpatient management at this time. No red flag warnings noted.   Patient given strict instructions to follow up with emergency room if they develop any red flag warnings which were discussed in depth.  They verbalized understanding.      Differential diagnosis, natural history, supportive care, and indications for immediate follow-up discussed. All questions answered. Patient agrees with the plan of care. Advised the patient to follow-up with the primary care physician for recheck, reevaluation, and consideration of further management or the emergency room for worsening symptoms.      Please note that this dictation was created using voice recognition software. I have made every reasonable attempt to correct obvious errors, but I expect that there are errors of grammar and possibly content that I did not discover before finalizing the note.

## 2025-03-07 ENCOUNTER — TELEPHONE (OUTPATIENT)
Dept: MEDICAL GROUP | Facility: PHYSICIAN GROUP | Age: OVER 89
End: 2025-03-07
Payer: MEDICARE

## 2025-03-11 DIAGNOSIS — E11.9 DIABETES MELLITUS TYPE 2 IN NONOBESE (HCC): ICD-10-CM

## 2025-03-11 NOTE — TELEPHONE ENCOUNTER
Received request via: Patient    Was the patient seen in the last year in this department? Yes    Does the patient have an active prescription (recently filled or refills available) for medication(s) requested? No    Pharmacy Name: [facility]     Does the patient have half-way Plus and need 100-day supply? (This applies to ALL medications) Patient does not have SCP

## 2025-03-30 DIAGNOSIS — J45.20 MILD INTERMITTENT ASTHMA WITHOUT COMPLICATION: ICD-10-CM

## 2025-04-03 RX ORDER — MONTELUKAST SODIUM 10 MG/1
10 TABLET ORAL DAILY
Qty: 90 TABLET | Refills: 3 | Status: SHIPPED | OUTPATIENT
Start: 2025-04-03

## 2025-04-03 NOTE — TELEPHONE ENCOUNTER
Received request via: Patient    Was the patient seen in the last year in this department? Yes    Does the patient have an active prescription (recently filled or refills available) for medication(s) requested? No    [facility]     Does the patient have longterm Plus and need 100-day supply? (This applies to ALL medications) Patient does not have SCP

## 2025-04-28 ENCOUNTER — OFFICE VISIT (OUTPATIENT)
Dept: URGENT CARE | Facility: PHYSICIAN GROUP | Age: OVER 89
End: 2025-04-28
Payer: MEDICARE

## 2025-04-28 VITALS
RESPIRATION RATE: 18 BRPM | TEMPERATURE: 97.9 F | DIASTOLIC BLOOD PRESSURE: 70 MMHG | OXYGEN SATURATION: 96 % | HEART RATE: 74 BPM | BODY MASS INDEX: 29.12 KG/M2 | SYSTOLIC BLOOD PRESSURE: 164 MMHG | WEIGHT: 164.4 LBS

## 2025-04-28 DIAGNOSIS — J06.9 VIRAL URI WITH COUGH: ICD-10-CM

## 2025-04-28 DIAGNOSIS — I10 ELEVATED BLOOD PRESSURE READING IN OFFICE WITH DIAGNOSIS OF HYPERTENSION: ICD-10-CM

## 2025-04-28 ASSESSMENT — FIBROSIS 4 INDEX: FIB4 SCORE: 2.54

## 2025-04-28 NOTE — PROGRESS NOTES
Subjective:   Amada Xavier is a 89 y.o. female who presents for Cough (X7 days Cough, congestion, was seen at [place] Friday, headache, )    Patient is a 89-year-old female presenting clinic today reporting 7-day history of cough, nasal congestion, intermittent phlegm production, and headache.  Patient states overall symptoms are improving.  She denies any fevers, chills, chest pain, shortness of breath, or palpitations.  Patient was seen at the [place] emergency department on Friday and was told that she has a viral URI.  Did review medical records from [facility] indicating chest x-ray was performed which patient states was normal, labs, viral testing, and breathing treatment was administered.  Patient does report she has history of asthma and does have an albuterol inhaler however she is unsure how frequently to use it.      Medications, Allergies, and current problem list reviewed today in Epic.     Objective:     BP (!) 164/70   Pulse 74   Temp 36.6 °C (97.9 °F) (Temporal)   Resp 18   Wt 74.6 kg (164 lb 6.4 oz)   SpO2 96%     Physical Exam  Vitals reviewed.   Constitutional:       General: She is not in acute distress.     Appearance: Normal appearance. She is not ill-appearing, toxic-appearing or diaphoretic.   HENT:      Head: Normocephalic.      Right Ear: Tympanic membrane, ear canal and external ear normal.      Left Ear: Tympanic membrane, ear canal and external ear normal.      Nose: Rhinorrhea present. No congestion.      Mouth/Throat:      Mouth: Mucous membranes are moist.      Pharynx: No posterior oropharyngeal erythema.   Eyes:      Extraocular Movements: Extraocular movements intact.      Conjunctiva/sclera: Conjunctivae normal.      Pupils: Pupils are equal, round, and reactive to light.   Cardiovascular:      Rate and Rhythm: Normal rate and regular rhythm.   Pulmonary:      Effort: Pulmonary effort is normal. No respiratory distress.      Breath sounds: Normal breath sounds.  No stridor. No wheezing, rhonchi or rales.   Musculoskeletal:         General: Normal range of motion.      Cervical back: Normal range of motion and neck supple. No tenderness.   Lymphadenopathy:      Cervical: No cervical adenopathy.   Skin:     General: Skin is warm and dry.   Neurological:      Mental Status: She is alert and oriented to person, place, and time.   Psychiatric:         Mood and Affect: Mood normal.         Behavior: Behavior normal.         Thought Content: Thought content normal.         Judgment: Judgment normal.         Assessment/Plan:     Diagnosis and associated orders:     1. Viral URI with cough           Comments/MDM:     This is an acute condition.  Patient is nontoxic-appearing in no acute distress.  Lung sounds are clear on physical exam.  Low suspicion of pneumonia at this time.  No signs of secondary bacterial infection.  Did place pulse ox on patient throughout the exam, oxygen saturation 96 to 97% on room air, heart rate mid 70s.  Did review medical records from [facility].  Discussed with patient symptoms continue to be viral in nature.  Patient does have elevated blood pressure reading in clinic, she is asymptomatic she states that she did not take her antihypertension medications at this time.  Recommended patient monitor blood pressures closely, if blood pressures are elevated greater than 1/5-day over 90 recommend following up with primary care, blood pressures are greater than 170/90 recommended following up in the ER for if she is symptomatic.  Encourage patient to keep a log.  Referral back to PCP.  Did discuss appropriate use of albuterol inhaler may use every 4-6 hours as needed, did recommend for the next 3 days to use it at least once in the morning and encourage respiratory toileting.  Did consider guaifenesin however patient states that her phlegm is loose and mild and has been improving.  Due to polypharmacy I did not prescribe it at this time.  Stressed with  patient the importance of staying well-hydrated.  If symptoms or not improving in the next 3 days or if they acutely worsen or new symptoms arise she should be evaluated in the emergency department.  Patient was involved with shared decision-making throughout the exam today and verbalizes understanding regards to plan of care, discharge instructions, and follow-up         Differential diagnosis, natural history, supportive care, and indications for immediate follow-up discussed.    Advised the patient to follow-up with the primary care physician for recheck, reevaluation, and consideration of further management.    I personally reviewed prior external notes and test results pertinent to today's visit as well as additional imaging and testing completed in clinic today.     Please note that this dictation was created using voice recognition software. I have made a reasonable attempt to correct obvious errors, but I expect that there are errors of grammar and possibly content that I did not discover before finalizing the note.

## 2025-04-28 NOTE — PATIENT INSTRUCTIONS
Albuterol inhaler: Take 1 to 2 puffs every 4-6 hours as needed for cough, chest tightness, shortness of breath, or wheezing.  I do recommend for the next 3 days to at least take 1 to 2 puffs first thing in the morning.  I would like you to cough throughout the day to help move the mucus in your chest to help prevent pneumonia.  If symptoms worsen, fever arises, or you are short of breath I do want you to follow back up at the [place] emergency department.

## 2025-05-08 ENCOUNTER — OFFICE VISIT (OUTPATIENT)
Dept: MEDICAL GROUP | Facility: PHYSICIAN GROUP | Age: OVER 89
End: 2025-05-08
Attending: NURSE PRACTITIONER
Payer: MEDICARE

## 2025-05-08 VITALS
DIASTOLIC BLOOD PRESSURE: 78 MMHG | HEART RATE: 76 BPM | BODY MASS INDEX: 28.88 KG/M2 | SYSTOLIC BLOOD PRESSURE: 126 MMHG | WEIGHT: 163 LBS | OXYGEN SATURATION: 94 % | HEIGHT: 63 IN | RESPIRATION RATE: 16 BRPM | TEMPERATURE: 97.8 F

## 2025-05-08 DIAGNOSIS — E11.9 DIABETES MELLITUS TYPE 2 IN NONOBESE (HCC): ICD-10-CM

## 2025-05-08 PROCEDURE — 99214 OFFICE O/P EST MOD 30 MIN: CPT | Performed by: FAMILY MEDICINE

## 2025-05-08 PROCEDURE — 3078F DIAST BP <80 MM HG: CPT | Performed by: FAMILY MEDICINE

## 2025-05-08 PROCEDURE — 3074F SYST BP LT 130 MM HG: CPT | Performed by: FAMILY MEDICINE

## 2025-05-08 RX ORDER — TRIAMCINOLONE ACETONIDE 1 MG/G
CREAM TOPICAL
COMMUNITY
Start: 2025-03-26

## 2025-05-08 ASSESSMENT — FIBROSIS 4 INDEX: FIB4 SCORE: 2.54

## 2025-05-14 NOTE — PROGRESS NOTES
Subjective:   Amada Xavier is a 89 y.o. female here today for evaluation and management of:     History of Present Illness  The patient is a 89-year-old female who presents for a follow-up visit after being seen in the ER for an upper viral infection.    She experienced a persistent cough lasting 3 days, which prompted her to seek medical attention at the ER. Subsequently, she was also evaluated at an urgent care facility. She reported the presence of green mucus and expressed concern about the potential development of a sinus infection. Additionally, she noted an occasional sensation of a sealed packet in her nasal passage. She has been making a conscious effort to maintain adequate hydration. She finds daily use of her albuterol inhaler beneficial in managing her symptoms. She does not require a new prescription for the inhaler as it is provided by Dr. Murray, her pulmonologist.    She reported an isolated episode of elevated blood sugar level, which she attributed to excessive sugar intake.    She experienced an episode of increased numbness in the bottom of her foot last week, but this has since resolved.          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    ROS  No chest pain, no shortness of breath, no abdominal pain        "Objective:     /78 (BP Location: Left arm, Patient Position: Sitting, BP Cuff Size: Adult)   Pulse 76   Temp 36.6 °C (97.8 °F) (Temporal)   Resp 16   Ht 1.6 m (5' 3\")   Wt 73.9 kg (163 lb)   SpO2 94%  Body mass index is 28.87 kg/m².   Physical Exam:  Constitutional: Alert, no distress.  Skin: Warm, dry, good turgor, no rashes in visible areas.  Eye: Equal, round and reactive, conjunctiva clear, lids normal.  ENMT: Lips without lesions, good dentition, oropharynx clear.  Neck: Trachea midline, no masses, no thyromegaly. No cervical or supraclavicular lymphadenopathy  Respiratory: Unlabored respiratory effort, lungs clear to auscultation, no wheezes, no ronchi.  Cardiovascular: Normal S1, S2, no murmur, no edema.  Abdomen: Soft, non-tender, no masses, no hepatosplenomegaly.  Psych: Alert and oriented x3, normal affect and mood.       The following treatment plan was discussed  Assessment & Plan  1. Post-ER visit follow-up.  - Oxygen saturation levels are consistently above 90, indicating satisfactory respiratory function.  - Blood glucose levels are within the normal range, and blood pressure readings are generally stable, with occasional slight elevations.  - Adequate hydration and the use of saline nasal spray are recommended to alleviate symptoms.  - Daily use of albuterol inhaler is encouraged; no new prescription needed as she obtains it from her pulmonologist.    2. Foot numbness.  - Increased numbness in the bottom of the foot was reported last week, which has since returned to the usual level of sensation.  - Foot examination today revealed some sensation.  - Discussion included the recent episode of increased numbness and its resolution.  - No specific treatment prescribed; monitoring advised.    3. Elevated blood pressure.  - Blood pressure readings were slightly elevated at 137 and 151 but returned to normal at 134.  - Blood pressure readings are being monitored regularly.  - Discussion included " the importance of regular monitoring.  - No new medications prescribed; continue current management.    4. Elevated blood glucose.  - Blood glucose levels are within the normal range, with a noted level of 130.  - Blood glucose levels are being monitored regularly.  - Discussion included the importance of regular monitoring.  - No new medications prescribed; continue current management.    Follow-up  The patient will follow up in 3 months. Laboratory tests have been ordered to be conducted prior to her next appointment in 08/2025.    1. Diabetes mellitus type 2 in nonobese (HCC)  - Diabetic Monofilament LE Exam    Other orders  - triamcinolone acetonide (KENALOG) 0.1 % Cream; APPLY CREAM EXTERNALLY TO AFFECTED AREA TWICE DAILY      Followup: Return for As Scheduled in August Print AVS pls.  Verbal consent was acquired by the patient to use [employer] ambient listening note generation during this visit Yes                 [1]   Current Outpatient Medications   Medication Sig Dispense Refill    triamcinolone acetonide (KENALOG) 0.1 % Cream APPLY CREAM EXTERNALLY TO AFFECTED AREA TWICE DAILY      montelukast (SINGULAIR) 10 MG Tab TAKE 1 TABLET BY MOUTH DAILY 90 Tablet 3    Blood Glucose Test Strips Test strips order: Test strips for insurance to approve meter. Sig: Use 1 test strip daily to check blood sugars one to three times a day#300 RF x 3 300 Strip 3    Misc. Devices Misc Please provide two mastectomy bras. 2 Each 0    hydrochlorothiazide (MICROZIDE) 12.5 MG capsule Take 1 Capsule by mouth every day. 90 Capsule 3    albuterol 108 (90 Base) MCG/ACT Aero Soln inhalation aerosol USE 1 TO 2 INHALATIONS BY MOUTH  EVERY 4 HOURS AS NEEDED FOR  SHORTNESS OF BREATH 40.2 g 3    levothyroxine (SYNTHROID) 75 MCG Tab TAKE 1 TABLET BY MOUTH IN THE  MORNING ON AN EMPTY STOMACH 90 Tablet 3    escitalopram (LEXAPRO) 10 MG Tab TAKE 1 TABLET BY MOUTH DAILY 90 Tablet 3    famotidine (PEPCID) 10 MG tablet Take 10 mg by mouth.       vitamin D ( VITAMIN D3) 1000 UNIT Tab Take 1,000 Units by mouth.      Blood Glucose Monitoring Suppl Device Meter: Dispense Device of Insurance Preference. Sig. Use as directed for blood sugar monitoring. #1. NR. 1 Each 0    Lancet Device [facility] Check blood sugars fasting and after meals daily #300 with 3 refills 300 Each 3    Lancets Lancets order: Lancets for insurance approved meter. Sig: Use 1 lancet once daily to check blood sugar #100 RF x 3 100 Each 3    losartan (COZAAR) 100 MG Tab Take 1 Tablet by mouth every day.      fenofibrate (TRIGLIDE) 160 MG tablet Take 1 Tablet by mouth every day.      aspirin 81 MG EC tablet Take 1 Tablet by mouth every day.      Calcium Citrate-Vitamin D 200-125 MG-UNIT Tab       rosuvastatin (CRESTOR) 10 MG Tab       fexofenadine (ALLEGRA) 180 MG tablet Take 1 Tablet by mouth every day.       No current facility-administered medications for this visit.

## 2025-05-25 NOTE — TELEPHONE ENCOUNTER
Received request via: Patient    Was the patient seen in the last year in this department? Yes    Does the patient have an active prescription (recently filled or refills available) for medication(s) requested? No    Pharmacy Name: [facility]    Does the patient have retirement Plus and need 100-day supply? (This applies to ALL medications) Patient does not have SCP

## 2025-05-27 RX ORDER — ESCITALOPRAM OXALATE 10 MG/1
10 TABLET ORAL DAILY
Qty: 90 TABLET | Refills: 3 | Status: SHIPPED | OUTPATIENT
Start: 2025-05-27

## 2025-05-27 RX ORDER — LEVOTHYROXINE SODIUM 75 UG/1
75 TABLET ORAL
Qty: 90 TABLET | Refills: 3 | Status: SHIPPED | OUTPATIENT
Start: 2025-05-27

## 2025-06-23 DIAGNOSIS — E78.5 HYPERLIPIDEMIA, UNSPECIFIED HYPERLIPIDEMIA TYPE: Primary | ICD-10-CM

## 2025-06-23 NOTE — TELEPHONE ENCOUNTER
Is the patient due for a refill? Yes    Was the patient seen the last 15 months? Yes    Date of last office visit: 10/25/24 (Was seen at [facility])    Does the patient have an upcoming appointment?  Yes   If yes, When? 7/16/25    Provider to refill:ANI    Does the patient have retirement Plus and need 100-day supply? (This applies to ALL medications) Patient does not have SCP

## 2025-06-23 NOTE — TELEPHONE ENCOUNTER
LADYI    Received request via: Patient    Was the patient seen in the last year in this department? No    Does the patient have an active prescription (recently filled or refills available) for medication(s) requested?  NO    Pharmacy Name:   [facility] -   [facility] - She is our of meds      Does the patient have FDC Plus and need 100-day supply? (This applies to ALL medications) Patient does not have SCP

## 2025-06-24 NOTE — TELEPHONE ENCOUNTER
To ANI - please refill fenofibrate if appropriate. Last seen by you at [facility] on 10/25/24. No current OV with our office with you. Last CBC and BMP 9/16/24. Thank you!

## 2025-06-25 RX ORDER — FENOFIBRATE 160 MG/1
160 TABLET ORAL DAILY
Qty: 90 TABLET | Refills: 1 | Status: SHIPPED | OUTPATIENT
Start: 2025-06-25

## 2025-07-08 ENCOUNTER — TELEPHONE (OUTPATIENT)
Dept: CARDIOLOGY | Facility: MEDICAL CENTER | Age: OVER 89
End: 2025-07-08
Payer: MEDICARE

## 2025-07-08 DIAGNOSIS — I65.29 CAROTID ATHEROSCLEROSIS, UNSPECIFIED LATERALITY: ICD-10-CM

## 2025-07-08 DIAGNOSIS — E11.9 DIABETES MELLITUS TYPE 2 IN NONOBESE (HCC): ICD-10-CM

## 2025-07-08 DIAGNOSIS — I35.1 AORTIC VALVE INSUFFICIENCY, ETIOLOGY OF CARDIAC VALVE DISEASE UNSPECIFIED: ICD-10-CM

## 2025-07-08 DIAGNOSIS — E03.9 HYPOTHYROIDISM, UNSPECIFIED TYPE: Primary | ICD-10-CM

## 2025-07-08 DIAGNOSIS — E78.5 HYPERLIPIDEMIA, UNSPECIFIED HYPERLIPIDEMIA TYPE: ICD-10-CM

## 2025-07-08 DIAGNOSIS — N18.31 STAGE 3A CHRONIC KIDNEY DISEASE: ICD-10-CM

## 2025-07-08 NOTE — TELEPHONE ENCOUNTER
"ANI - Please advise. Former [facility] pt seeing you for [facility] on 7/16/25 requesting we send lab orders over to [facility] but there are no active orders on file from our office. Last [facility] OV note stated \"She will obtain CBC, fasting lipid panel, CMP, hemoglobin A1c, TSH, and free T3 prior to that clinic visit.\" Would you like me to order these labs to send over to [facility] to have pt complete prior to FV? Thank you!  "

## 2025-07-08 NOTE — TELEPHONE ENCOUNTER
Orders placed for labs per ANI and faxed to [facility] in [place] at [phone] per pt request to complete prior to FV with ANI on 7/16/25. Scanned confirmation report into [employer].     Phone Number Called: [phone]  Call outcome: Spoke to patient regarding message below.  Message: Called to inform patient that Dr. العلي did place some orders for lab work to complete prior to her follow-up appointment. Confirmed pt's request to send these lab orders to the [facility] lab in [place]. Advised pt that we will fax over these orders for lab work and instructed pt to remember to fast at least 8-10 hours prior to getting her blood drawn. Pt verbalizes understanding.

## 2025-07-08 NOTE — TELEPHONE ENCOUNTER
VIOLETA      Caller: Amada Xavier     Topic/issue: Pt has upcoming appt and saw Mertjennifertoya at [facility]. Pt states she lost the lab orders Severiano needed her to complete and she would like Severiano to send to [facility] this week so she can complete prior to appt     Callback Number: [phone]    Thank You   Jen CASH

## 2025-07-09 ENCOUNTER — RESULTS FOLLOW-UP (OUTPATIENT)
Dept: CARDIOLOGY | Facility: MEDICAL CENTER | Age: OVER 89
End: 2025-07-09
Payer: MEDICARE

## 2025-07-09 DIAGNOSIS — N18.31 STAGE 3A CHRONIC KIDNEY DISEASE: ICD-10-CM

## 2025-07-09 DIAGNOSIS — I65.29 CAROTID ATHEROSCLEROSIS, UNSPECIFIED LATERALITY: ICD-10-CM

## 2025-07-09 DIAGNOSIS — I35.1 AORTIC VALVE INSUFFICIENCY, ETIOLOGY OF CARDIAC VALVE DISEASE UNSPECIFIED: ICD-10-CM

## 2025-07-09 LAB
CHOLEST SERPL-MCNC: 157 MG/DL
HDLC SERPL-MCNC: 71 MG/DL
LDLC SERPL CALC-MCNC: 66 MG/DL
TRIGL SERPL-MCNC: 92 MG/DL

## 2025-07-09 NOTE — TELEPHONE ENCOUNTER
Phone Number Called: [phone]  Call outcome: Spoke to patient regarding message below.  Message: Called to inform patient of ANI recs based on recent lab results below:  Please let the patient know that her blood work looks good.  Follow-up with me next week as scheduled.  Please wish her happy birthday!      Pt verbalizes understanding and confirms that she will follow-up with ANI next week and states she is appreciative that Dr. العلي wished her a happy birthday.

## 2025-07-09 NOTE — RESULT ENCOUNTER NOTE
Please let the patient know that her blood work looks good.  Follow-up with me next week as scheduled.  Please wish her happy birthday!

## 2025-07-14 ENCOUNTER — TELEPHONE (OUTPATIENT)
Dept: CARDIOLOGY | Facility: MEDICAL CENTER | Age: OVER 89
End: 2025-07-14
Payer: MEDICARE

## 2025-07-14 NOTE — TELEPHONE ENCOUNTER
Called pt, no answer, let pt know of upcoming appointment and to call back if she has seen another cardiologist besides ANI or if she has had any recent lab or cardiac imaging done.

## 2025-07-16 ENCOUNTER — OFFICE VISIT (OUTPATIENT)
Dept: CARDIOLOGY | Facility: MEDICAL CENTER | Age: OVER 89
End: 2025-07-16
Attending: INTERNAL MEDICINE
Payer: MEDICARE

## 2025-07-16 VITALS
HEART RATE: 70 BPM | HEIGHT: 63 IN | RESPIRATION RATE: 16 BRPM | OXYGEN SATURATION: 93 % | SYSTOLIC BLOOD PRESSURE: 146 MMHG | BODY MASS INDEX: 29.41 KG/M2 | WEIGHT: 166 LBS | DIASTOLIC BLOOD PRESSURE: 62 MMHG

## 2025-07-16 DIAGNOSIS — I35.1 AORTIC VALVE INSUFFICIENCY, ETIOLOGY OF CARDIAC VALVE DISEASE UNSPECIFIED: Primary | ICD-10-CM

## 2025-07-16 DIAGNOSIS — I10 ESSENTIAL HYPERTENSION: ICD-10-CM

## 2025-07-16 DIAGNOSIS — E03.9 HYPOTHYROIDISM, UNSPECIFIED TYPE: ICD-10-CM

## 2025-07-16 DIAGNOSIS — N18.31 TYPE 2 DIABETES MELLITUS WITH STAGE 3A CHRONIC KIDNEY DISEASE, WITHOUT LONG-TERM CURRENT USE OF INSULIN (HCC): ICD-10-CM

## 2025-07-16 DIAGNOSIS — N18.31 STAGE 3A CHRONIC KIDNEY DISEASE: ICD-10-CM

## 2025-07-16 DIAGNOSIS — I71.21 ANEURYSM OF ASCENDING AORTA WITHOUT RUPTURE (HCC): ICD-10-CM

## 2025-07-16 DIAGNOSIS — E78.5 DYSLIPIDEMIA: ICD-10-CM

## 2025-07-16 DIAGNOSIS — E11.22 TYPE 2 DIABETES MELLITUS WITH STAGE 3A CHRONIC KIDNEY DISEASE, WITHOUT LONG-TERM CURRENT USE OF INSULIN (HCC): ICD-10-CM

## 2025-07-16 LAB — EKG IMPRESSION: NORMAL

## 2025-07-16 PROCEDURE — 93010 ELECTROCARDIOGRAM REPORT: CPT | Performed by: INTERNAL MEDICINE

## 2025-07-16 PROCEDURE — G2211 COMPLEX E/M VISIT ADD ON: HCPCS | Performed by: INTERNAL MEDICINE

## 2025-07-16 PROCEDURE — 3078F DIAST BP <80 MM HG: CPT | Performed by: INTERNAL MEDICINE

## 2025-07-16 PROCEDURE — 93005 ELECTROCARDIOGRAM TRACING: CPT | Mod: TC | Performed by: INTERNAL MEDICINE

## 2025-07-16 PROCEDURE — 99204 OFFICE O/P NEW MOD 45 MIN: CPT | Performed by: INTERNAL MEDICINE

## 2025-07-16 PROCEDURE — 3077F SYST BP >= 140 MM HG: CPT | Performed by: INTERNAL MEDICINE

## 2025-07-16 PROCEDURE — 99213 OFFICE O/P EST LOW 20 MIN: CPT | Performed by: INTERNAL MEDICINE

## 2025-07-16 RX ORDER — HYDROCHLOROTHIAZIDE 25 MG/1
25 TABLET ORAL DAILY
Qty: 90 TABLET | Refills: 3 | Status: SHIPPED | OUTPATIENT
Start: 2025-07-16

## 2025-07-16 RX ORDER — KETOCONAZOLE 20 MG/G
CREAM TOPICAL DAILY
COMMUNITY

## 2025-07-16 ASSESSMENT — ENCOUNTER SYMPTOMS
LOSS OF CONSCIOUSNESS: 0
COUGH: 0
PND: 0
NAUSEA: 0
BLURRED VISION: 1
DEPRESSION: 0
PALPITATIONS: 0
ABDOMINAL PAIN: 1
MYALGIAS: 0
BRUISES/BLEEDS EASILY: 0
SHORTNESS OF BREATH: 0
DIZZINESS: 0
CHILLS: 0
FEVER: 0
NERVOUS/ANXIOUS: 0
ORTHOPNEA: 0

## 2025-07-16 ASSESSMENT — FIBROSIS 4 INDEX: FIB4 SCORE: 2.56

## 2025-07-16 NOTE — PROGRESS NOTES
Admission Date / Service Date: 25    Patient's PCP: Remberto Kaufman M.D.      HPI: Amada Xavier is a 89 y.o. 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    She was intolerant to diltiazem and nebivolol 5 mg which caused significant bradycardia with heart rate down to the 40s.    Her vision has been worsening so her amlodipine was discontinued as Dr. Duke told her that amlodipine could make her macular degeneration worse.     She overall is doing well.  She is monitoring her blood pressure and it ranges from 134 to 163 mmHg systolic over 66 to 74 mm diastolic.    She denies any chest pain or pressure.  She reports that a few weeks ago she had a near syncopal episode.  She was bending over and suddenly she developed some abdominal pain and then felt lightheaded.  It lasted several seconds and spontaneously resolved.  She has not had any further episodes.  She is not good about drinking water.    She denies any shortness of breath, dyspnea on exertion, orthopnea, PND, or lower extremity edema.  No palpitations.    Cardiology Results:  EK2025: Reviewed by me shows normal sinus rhythm.  Borderline right axis deviation.  Minor nonspecific ST segment abnormalities.    Cath (Cath 2012 in [place] showed anomalous origin of left main coronary artery off the right coronary cusp. Minimal coronary artery plaquing. Chest CT at that time  "showed no evidence of coronary artery compression.) - 2012    Echo 2024: Normal left ventricular size, thickness, and systolic function with EF 65-70%. Grade 1 diastolic dysfunction. Trace AI. Mild TR. RVSP 30 mm Hg.     MPI (Lexiscan MPI 3/28/2019: Mild inferior ischemia. EF 75%.) - 3/28/2019     24 hour Holter 2022: Sinus rhythm with average rate of 88 bpm (range of 69 to 120 bpm). 112 PVCs. 218 PACs with up to 4 beat run of atrial tachycardia. No symptoms reported.    Current list of administered Medications:  Current Medications[1]    Past Medical History[2]    Past Surgical History[3]    Family History   Problem Relation Age of Onset    Heart Disease Mother          age 88, CAD    GI Disease Father         cirrhosis, CHF    Heart Disease Father     Heart Failure Father     Other Father              Allergies[4]    Review of systems:  Review of Systems   Constitutional:  Negative for chills and fever.   HENT:  Positive for hearing loss. Negative for congestion.    Eyes:  Positive for blurred vision.   Respiratory:  Negative for cough and shortness of breath.    Cardiovascular:  Negative for chest pain, palpitations, orthopnea, leg swelling and PND.   Gastrointestinal:  Positive for abdominal pain. Negative for nausea.   Musculoskeletal:  Negative for myalgias.   Skin:  Negative for rash.   Neurological:  Negative for dizziness and loss of consciousness.   Endo/Heme/Allergies:  Does not bruise/bleed easily.   Psychiatric/Behavioral:  Negative for depression. The patient is not nervous/anxious.        Physical exam:  Vitals:    25 1033   BP: (!) 146/62   BP Location: Right arm   Patient Position: Sitting   BP Cuff Size: Adult   Pulse: 70   Resp: 16   SpO2: 93%   Weight: 75.3 kg (166 lb)   Height: 1.6 m (5' 3\")        General: No acute distress. Well nourished.  HEENT: Normocephalic.  Atraumatic.  EOM grossly intact, no scleral icterus.  Neck:  No JVD, no bruits  CVS: RRR. Normal S1, S2. No " murmurs, gallops, or rubs.   Resp: CTAB. No wheezing or crackles/rhonchi. Normal respiratory effort.  Abdomen: Soft, NT, no velasquez hepatomegaly.  MSK/Ext: No clubbing or cyanosis. No edema.  Skin: Warm and dry, no rashes.  Neurological: CN III-XII grossly intact. No focal deficits.   Psych: A&O x 3, appropriate affect, good judgement    Data:  Laboratory studies:    July 9, 2025: CBC unremarkable.  CMP normal except for BUN of 29 with creatinine 1.11, GFR 46, and glucose 117.  Hemoglobin A1c 6.0.  Total cholesterol 157, triglycerides 92, HDL 71, LDL 66.  TSH normal at 3.53.    Laboratory data September 16, 2024: BMP normal except for BUN of 24 with creatinine 1.11. White blood cell count 4.7, hemoglobin 11.6, hematocrit 35.1, platelets 218. Total cholesterol 152, triglycerides 85, HDL 71, LDL 64.     Assessment :  1. Aortic valve insufficiency, etiology of cardiac valve disease unspecified  EKG      2. Aneurysm of ascending aorta without rupture (HCC)        3. Essential hypertension  hydroCHLOROthiazide 25 MG Tab    CBC WITHOUT DIFFERENTIAL      4. Dyslipidemia  Lipid Profile    Comp Metabolic Panel      5. Hypothyroidism, unspecified type  TSH    FREE THYROXINE      6. Stage 3a chronic kidney disease        7. Type 2 diabetes mellitus with stage 3a chronic kidney disease, without long-term current use of insulin (HCC)  HEMOGLOBIN A1C           Plan:  I will increase the patient's hydrochlorothiazide to 25 mg daily for her hypertension.  I will continue her other medications.  We discussed low-salt, low-fat, diabetic diet.  I instructed her to continue monitoring her blood pressure.  We discussed activities.  She will return to clinic in 6 months or sooner if she has any problems.  I will obtain a CBC, fasting lipid panel, CMP, hemoglobin A1c, TSH, and free T4 prior to that clinic visit.    Return in about 6 months (around 1/16/2026).     Thank you for allowing me to participate in this patient's care.  Please do  not hesitate to contact me with questions or concerns.    Cat العلي MD, [facility]  Cardiologist   [facility]    Please note that this dictation was created using voice recognition software. I have made every reasonable attempt to correct obvious errors, but it is possible there are errors of grammar and possibly content that I did not discover before finalizing the note.         [1]   Current Outpatient Medications:     ketoconazole (NIZORAL) 2 % Cream, Apply  topically every day., Disp: , Rfl:     hydroCHLOROthiazide 25 MG Tab, Take 1 Tablet by mouth every day., Disp: 90 Tablet, Rfl: 3    fenofibrate (TRIGLIDE) 160 MG tablet, Take 1 Tablet by mouth every day., Disp: 90 Tablet, Rfl: 1    levothyroxine (SYNTHROID) 75 MCG Tab, TAKE 1 TABLET BY MOUTH IN THE  MORNING ON AN EMPTY STOMACH, Disp: 90 Tablet, Rfl: 3    escitalopram (LEXAPRO) 10 MG Tab, TAKE 1 TABLET BY MOUTH DAILY, Disp: 90 Tablet, Rfl: 3    triamcinolone acetonide (KENALOG) 0.1 % Cream, APPLY CREAM EXTERNALLY TO AFFECTED AREA TWICE DAILY, Disp: , Rfl:     montelukast (SINGULAIR) 10 MG Tab, TAKE 1 TABLET BY MOUTH DAILY, Disp: 90 Tablet, Rfl: 3    Blood Glucose Test Strips, Test strips order: Test strips for insurance to approve meter. Sig: Use 1 test strip daily to check blood sugars one to three times a day#300 RF x 3, Disp: 300 Strip, Rfl: 3    Misc. Devices Misc, Please provide two mastectomy bras., Disp: 2 Each, Rfl: 0    albuterol 108 (90 Base) MCG/ACT Aero Soln inhalation aerosol, USE 1 TO 2 INHALATIONS BY MOUTH  EVERY 4 HOURS AS NEEDED FOR  SHORTNESS OF BREATH, Disp: 40.2 g, Rfl: 3    famotidine (PEPCID) 10 MG tablet, Take 10 mg by mouth., Disp: , Rfl:     vitamin D ( VITAMIN D3) 1000 UNIT Tab, Take 1,000 Units by mouth., Disp: , Rfl:     Blood Glucose Monitoring Suppl Device, Meter: Dispense Device of Insurance Preference. Sig. Use as directed for blood sugar monitoring. #1. NR., Disp: 1 Each, Rfl: 0    Lancet  Device Misc, Check blood sugars fasting and after meals daily #300 with 3 refills, Disp: 300 Each, Rfl: 3    Lancets, Lancets order: Lancets for insurance approved meter. Sig: Use 1 lancet once daily to check blood sugar #100 RF x 3, Disp: 100 Each, Rfl: 3    losartan (COZAAR) 100 MG Tab, Take 1 Tablet by mouth every day., Disp: , Rfl:     aspirin 81 MG EC tablet, Take 1 Tablet by mouth every day., Disp: , Rfl:     Calcium Citrate-Vitamin D 200-125 MG-UNIT Tab, , Disp: , Rfl:     rosuvastatin (CRESTOR) 10 MG Tab, , Disp: , Rfl:     fexofenadine (ALLEGRA) 180 MG tablet, Take 1 Tablet by mouth every day., Disp: , Rfl:   [2]   Past Medical History:  Diagnosis Date    6th nerve palsy     Allergic rhinitis     Anomalous origin of coronary artery 8/8/2012    Aortic regurgitation 10/4/2012    ASTHMA     Breast cancer (HCC) 8/8/2012    Bronchitis     Cancer ([facility])     Carotid artery plaque 10/4/2012    Chickenpox     Depression 8/8/2012    Diabetes     Diabetes mellitus type 2 in nonobese (HCC) 8/8/2012    GERD (gastroesophageal reflux disease)     [place] measles     Glaucoma     History of colonoscopy 8/8/2012    History of mammogram 8/8/2012    Hyperlipidemia 8/8/2012    Hypertension 8/8/2012    Hypothyroidism 8/8/2012    Influenza     Mumps     Nasal drainage     Obesity     Rosacea     Thyroid disease     Whooping cough    [3]   Past Surgical History:  Procedure Laterality Date    CATARACT EXTRACTION WITH IOL      EYE SURGERY      LUMPECTOMY      MASTECTOMY      left    MASTECTOMY      TN REMV 2ND CATARACT,CORN-SCLER SECTN     [4]   Allergies  Allergen Reactions    Atorvastatin     Bacitracin     Clarithromycin Hives    Linaclotide Diarrhea and Hives     Severe diarrhea  Severe diarrhea    Pcn [Penicillins]     Sulfa Drugs

## 2025-08-07 ENCOUNTER — APPOINTMENT (OUTPATIENT)
Dept: MEDICAL GROUP | Facility: PHYSICIAN GROUP | Age: OVER 89
End: 2025-08-07
Payer: MEDICARE

## 2025-08-07 ASSESSMENT — ENCOUNTER SYMPTOMS: GENERAL WELL-BEING: EXCELLENT

## 2025-08-07 ASSESSMENT — ACTIVITIES OF DAILY LIVING (ADL): BATHING_REQUIRES_ASSISTANCE: 0

## 2025-08-07 ASSESSMENT — FIBROSIS 4 INDEX: FIB4 SCORE: 2.56

## 2025-08-07 ASSESSMENT — PATIENT HEALTH QUESTIONNAIRE - PHQ9: CLINICAL INTERPRETATION OF PHQ2 SCORE: 0
